# Patient Record
Sex: MALE | Race: WHITE | NOT HISPANIC OR LATINO | Employment: OTHER | URBAN - METROPOLITAN AREA
[De-identification: names, ages, dates, MRNs, and addresses within clinical notes are randomized per-mention and may not be internally consistent; named-entity substitution may affect disease eponyms.]

---

## 2017-03-27 ENCOUNTER — HISTORICAL (OUTPATIENT)
Dept: RADIOLOGY | Facility: HOSPITAL | Age: 77
End: 2017-03-27

## 2017-03-29 ENCOUNTER — HISTORICAL (OUTPATIENT)
Dept: LAB | Facility: HOSPITAL | Age: 77
End: 2017-03-29

## 2017-03-30 ENCOUNTER — HISTORICAL (OUTPATIENT)
Dept: MEDSURG UNIT | Facility: HOSPITAL | Age: 77
End: 2017-03-30

## 2017-04-13 ENCOUNTER — HISTORICAL (OUTPATIENT)
Dept: RADIOLOGY | Facility: HOSPITAL | Age: 77
End: 2017-04-13

## 2017-04-17 ENCOUNTER — HISTORICAL (OUTPATIENT)
Dept: CARDIOLOGY | Facility: HOSPITAL | Age: 77
End: 2017-04-17

## 2017-07-07 ENCOUNTER — HISTORICAL (OUTPATIENT)
Dept: RADIOLOGY | Facility: HOSPITAL | Age: 77
End: 2017-07-07

## 2017-07-26 ENCOUNTER — HISTORICAL (OUTPATIENT)
Dept: ADMINISTRATIVE | Facility: HOSPITAL | Age: 77
End: 2017-07-26

## 2017-11-14 ENCOUNTER — HISTORICAL (OUTPATIENT)
Dept: LAB | Facility: HOSPITAL | Age: 77
End: 2017-11-14

## 2018-09-26 ENCOUNTER — HISTORICAL (OUTPATIENT)
Dept: RADIOLOGY | Facility: HOSPITAL | Age: 78
End: 2018-09-26

## 2019-02-08 ENCOUNTER — HISTORICAL (OUTPATIENT)
Dept: LAB | Facility: HOSPITAL | Age: 79
End: 2019-02-08

## 2021-02-02 ENCOUNTER — HISTORICAL (OUTPATIENT)
Dept: RADIOLOGY | Facility: HOSPITAL | Age: 81
End: 2021-02-02

## 2021-02-02 LAB
ALBUMIN SERPL-MCNC: 4 GM/DL (ref 3.4–4.8)
ALBUMIN/GLOB SERPL: 1.2 RATIO (ref 1.1–2)
ALP SERPL-CCNC: 99 UNIT/L (ref 40–150)
ALT SERPL-CCNC: 18 UNIT/L (ref 0–55)
AST SERPL-CCNC: 22 UNIT/L (ref 5–34)
BILIRUB SERPL-MCNC: 0.9 MG/DL
BILIRUBIN DIRECT+TOT PNL SERPL-MCNC: 0.3 MG/DL (ref 0–0.5)
BILIRUBIN DIRECT+TOT PNL SERPL-MCNC: 0.6 MG/DL (ref 0–0.8)
BUN SERPL-MCNC: 12.5 MG/DL (ref 8.4–25.7)
CALCIUM SERPL-MCNC: 9 MG/DL (ref 8.8–10)
CHLORIDE SERPL-SCNC: 106 MMOL/L (ref 98–107)
CO2 SERPL-SCNC: 26 MMOL/L (ref 23–31)
CREAT SERPL-MCNC: 1.31 MG/DL (ref 0.73–1.18)
GLOBULIN SER-MCNC: 3.3 GM/DL (ref 2.4–3.5)
GLUCOSE SERPL-MCNC: 109 MG/DL (ref 82–115)
POTASSIUM SERPL-SCNC: 4.8 MMOL/L (ref 3.5–5.1)
PROT SERPL-MCNC: 7.3 GM/DL (ref 5.8–7.6)
SODIUM SERPL-SCNC: 141 MMOL/L (ref 136–145)

## 2021-04-19 ENCOUNTER — HISTORICAL (OUTPATIENT)
Dept: LAB | Facility: HOSPITAL | Age: 81
End: 2021-04-19

## 2021-04-19 LAB
ALBUMIN SERPL-MCNC: 4.2 GM/DL (ref 3.4–4.8)
ALP SERPL-CCNC: 84 UNIT/L (ref 40–150)
ALT SERPL-CCNC: 19 UNIT/L (ref 0–55)
AST SERPL-CCNC: 18 UNIT/L (ref 5–34)
BILIRUB SERPL-MCNC: 1.1 MG/DL
BILIRUBIN DIRECT+TOT PNL SERPL-MCNC: 0.5 MG/DL (ref 0–0.5)
BILIRUBIN DIRECT+TOT PNL SERPL-MCNC: 0.6 MG/DL (ref 0–0.8)
CHOLEST SERPL-MCNC: 113 MG/DL
CHOLEST/HDLC SERPL: 3 {RATIO} (ref 0–5)
HDLC SERPL-MCNC: 45 MG/DL (ref 35–60)
LDLC SERPL CALC-MCNC: 48 MG/DL (ref 50–140)
PROT SERPL-MCNC: 6.7 GM/DL (ref 5.8–7.6)
TRIGL SERPL-MCNC: 100 MG/DL (ref 34–140)
VLDLC SERPL CALC-MCNC: 20 MG/DL

## 2021-09-28 ENCOUNTER — HISTORICAL (OUTPATIENT)
Dept: LAB | Facility: HOSPITAL | Age: 81
End: 2021-09-28

## 2021-09-28 LAB
ABS NEUT (OLG): 3.3 X10(3)/MCL (ref 2.1–9.2)
ALBUMIN SERPL-MCNC: 4 GM/DL (ref 3.4–4.8)
ALP SERPL-CCNC: 77 UNIT/L (ref 40–150)
ALT SERPL-CCNC: 22 UNIT/L (ref 0–55)
AST SERPL-CCNC: 20 UNIT/L (ref 5–34)
BASOPHILS # BLD AUTO: 0 X10(3)/MCL (ref 0–0.2)
BASOPHILS NFR BLD AUTO: 0 %
BILIRUB SERPL-MCNC: 0.9 MG/DL
BILIRUBIN DIRECT+TOT PNL SERPL-MCNC: 0.3 MG/DL (ref 0–0.5)
BILIRUBIN DIRECT+TOT PNL SERPL-MCNC: 0.6 MG/DL (ref 0–0.8)
BUN SERPL-MCNC: 18.6 MG/DL (ref 8.4–25.7)
CALCIUM SERPL-MCNC: 9.5 MG/DL (ref 8.8–10)
CHLORIDE SERPL-SCNC: 110 MMOL/L (ref 98–107)
CHOLEST SERPL-MCNC: 161 MG/DL
CHOLEST/HDLC SERPL: 4 {RATIO} (ref 0–5)
CO2 SERPL-SCNC: 26 MMOL/L (ref 23–31)
CREAT SERPL-MCNC: 1.54 MG/DL (ref 0.73–1.18)
CREAT/UREA NIT SERPL: 12
EOSINOPHIL # BLD AUTO: 0.2 X10(3)/MCL (ref 0–0.9)
EOSINOPHIL NFR BLD AUTO: 4 %
ERYTHROCYTE [DISTWIDTH] IN BLOOD BY AUTOMATED COUNT: 12.5 % (ref 11.5–17)
FT4I SERPL CALC-MCNC: 2.5 (ref 2.6–3.6)
GLUCOSE SERPL-MCNC: 104 MG/DL (ref 82–115)
HCT VFR BLD AUTO: 38.6 % (ref 42–52)
HDLC SERPL-MCNC: 41 MG/DL (ref 35–60)
HGB BLD-MCNC: 13.1 GM/DL (ref 14–18)
IMM GRANULOCYTES # BLD AUTO: 0.01 % (ref 0–0.02)
IMM GRANULOCYTES NFR BLD AUTO: 0.2 % (ref 0–0.43)
LDLC SERPL CALC-MCNC: 96 MG/DL (ref 50–140)
LYMPHOCYTES # BLD AUTO: 1.8 X10(3)/MCL (ref 0.6–4.6)
LYMPHOCYTES NFR BLD AUTO: 31 %
MCH RBC QN AUTO: 31.9 PG (ref 27–31)
MCHC RBC AUTO-ENTMCNC: 33.9 GM/DL (ref 33–36)
MCV RBC AUTO: 93.9 FL (ref 80–94)
MONOCYTES # BLD AUTO: 0.4 X10(3)/MCL (ref 0.1–1.3)
MONOCYTES NFR BLD AUTO: 8 %
NEUTROPHILS # BLD AUTO: 3.3 X10(3)/MCL (ref 1.4–7.9)
NEUTROPHILS NFR BLD AUTO: 57 %
PLATELET # BLD AUTO: 162 X10(3)/MCL (ref 130–400)
PMV BLD AUTO: 10.7 FL (ref 9.4–12.4)
POTASSIUM SERPL-SCNC: 5.7 MMOL/L (ref 3.5–5.1)
PROT SERPL-MCNC: 7 GM/DL (ref 5.8–7.6)
RBC # BLD AUTO: 4.11 X10(6)/MCL (ref 4.7–6.1)
SODIUM SERPL-SCNC: 144 MMOL/L (ref 136–145)
T3RU NFR SERPL: 37.3 % (ref 31–39)
T4 SERPL-MCNC: 6.69 UG/DL (ref 4.87–11.72)
TRIGL SERPL-MCNC: 118 MG/DL (ref 34–140)
TSH SERPL-ACNC: 5.38 UIU/ML (ref 0.35–4.94)
VLDLC SERPL CALC-MCNC: 24 MG/DL
WBC # SPEC AUTO: 5.8 X10(3)/MCL (ref 4.5–11.5)

## 2021-10-19 ENCOUNTER — HISTORICAL (OUTPATIENT)
Dept: RADIOLOGY | Facility: HOSPITAL | Age: 81
End: 2021-10-19

## 2022-04-07 ENCOUNTER — HISTORICAL (OUTPATIENT)
Dept: ADMINISTRATIVE | Facility: HOSPITAL | Age: 82
End: 2022-04-07
Payer: OTHER GOVERNMENT

## 2022-04-24 VITALS — HEIGHT: 71 IN | WEIGHT: 205.94 LBS | BODY MASS INDEX: 28.83 KG/M2

## 2022-04-28 NOTE — OP NOTE
DATE OF SURGERY:    26 July, 2017    SURGEON:  Qamar Ricci IV, MD  ASSISTANT:  SHAWN Whitmore    PREOPERATIVE DIAGNOSIS:  Progressive and intractable biliary colic with recent angiogram and placement of drug-eluting stent (three months) with the patient currently on Plavix and aspirin in the setting of coronary atherosclerotic disease, hypertension and hyperlipidemia.    POSTOPERATIVE DIAGNOSIS:    1. Progressive and intractable biliary colic with recent angiogram and placement of drug-eluting stent (three months) with the patient currently on Plavix and aspirin in the setting of coronary atherosclerotic disease, hypertension and hyperlipidemia.  2. Findings of moderate chronic cholecystitis.    PROCEDURE:  Laparoscopic cholecystectomy -  difficult secondary to full and complete concurrent anticoagulation.     Anesthesia:  General with local infiltration.     Estimated blood loss:  Less than 2 ml.     Specimen removed:  Gallbladder with contents aspirated.    BRIEF HISTORY:  This 76-year-old male presented with progressive and intractable epigastric pain and chest pain.  This led to a cardiac work up resulting in the patient undergoing angiography and stent placement.  However, following angiography and stent the patient failed to have relief of his symptoms, which did become progressive and intractable and proceed to the point where he could not tolerate the symptoms any further.  Additional work up was consistent with significant biliary colic on the HIDA scan.  The patient was subsequently referred to me for further consultation and discussion in regards for need for additional therapy.  Full and viri discussion regarding the necessity for maintaining the patient on antiplatelet therapy was made with the patient fully understanding his risk of heart attack and the possible fatal consequences should we withdraw his antiplatelet therapy.  However, he stated again and again that he could not live with  his current symptoms and he could not possibly tolerate proceeding on like this and he would rather die than continue to live with his pain and discomfort whenever he ate.  Subsequently with his full and viri understanding of the increased risk of possible bleeding and his thrombosis as well we plan to proceed to the operating room with a careful and tedious cholecystectomy with strict attention to hemostasis above and beyond what we might accept in regards to nonsurgical bleeding.    PROCEDURE IN DETAIL:  After proper informed consent was obtained the patient was transported to the operating room where he was placed supine on the operating room table.  After an adequate level of general endotracheal anesthesia was achieved the patient's abdomen was prepped and draped in the standard sterile surgical fashion.  The operation commenced with infiltration of local anesthetic in the periumbilical area followed by a periumbilical incision and blunt dissection of the base of the umbilical ligament where a small hernia was identified and dilated laterally.  Careful and tedious attention was made to any bleeding vessels and all were controlled with spot application of Bovie electrocautery.  A blunt tip trocar was inserted and secured.  Abdominal insufflation was undertaken to 15 mmHg pressure without significant hemodynamic change.  A camera was inserted.  Abdominal contents were inspected and photographed.  No masses, lesions or pathology were immediately identified.  Following infiltration of local anesthetic and through minor stab incision two epigastric 5 mm trocars were placed.  Utilizing these trocars the gallbladder was grasped and elevated.  The fibrofatty tissues were dissected from the triangle of Calot with careful and tedious technique with spot application Bovie electrocautery to every single bleeding vessel no matter how small that we identified.  That being said, Bovie electrocautery use was judicious and was  well away from any critical structures.  Once the critical view was achieved two clips were placed in the proximal cystic artery, two clips in the proximal cystic duct, one clip distally.  The duct and artery were transected in sequence.  The gallbladder was excised from its bed with low power Bovie electrocautery with a careful and tedious approach with strict tensioned hemostasis, which was excellent during the course of the resection.  The gallbladder was subsequently removed from its bed and lysed from its adhesions and was withdrawn through the umbilical trocar site.  The camera was reinserted, copious irrigation of the area assured that there was absolutely no hemorrhage whatsoever, and hemostasis was perfect and there was no bile staining.  The epigastric trocars were removed under direct visualization.  Pinpoint Bovie electrocautery was placed at the peritoneal surface where there was point bleeding.  The sites were inspected for a prolonged period of time under reduced insufflation of the abdomen with 5 mmHg pressure in order to witness any potential venous oozing.  With none seen the umbilical trocar was finally removed.  Hemostasis at this site was excellent also.  The fascia was closed with 0 Vicryl figure-of-eight suture x two.  The wounds were irrigated with warm normal saline and closed with a combination of 3-0 Vicryl and 4-0 Monocryl subcuticular closures.  Sterile dressings were applied.  The patient was awakened from his anesthesia and transported to the recovery room in good and stable condition.  All sponge, needle and instrument counts were correct at the end of the case.  There were no complications.        ______________________________  MD LYNDON Rodriguez IV/TAMMI  DD:  08/02/2017  Time:  08:55PM  DT:  08/03/2017  Time:  11:51AM  Job #:  [id number]

## 2022-07-19 ENCOUNTER — HOSPITAL ENCOUNTER (EMERGENCY)
Facility: HOSPITAL | Age: 82
Discharge: LEFT BEFORE DISCHARGE | End: 2022-07-19
Payer: OTHER GOVERNMENT

## 2022-07-19 VITALS
OXYGEN SATURATION: 99 % | TEMPERATURE: 98 F | SYSTOLIC BLOOD PRESSURE: 159 MMHG | RESPIRATION RATE: 18 BRPM | HEIGHT: 71 IN | HEART RATE: 56 BPM | WEIGHT: 200 LBS | BODY MASS INDEX: 28 KG/M2 | DIASTOLIC BLOOD PRESSURE: 81 MMHG

## 2022-07-19 LAB
APPEARANCE UR: ABNORMAL
BACTERIA #/AREA URNS AUTO: ABNORMAL /HPF
BILIRUB UR QL STRIP.AUTO: ABNORMAL MG/DL
COLOR UR AUTO: ABNORMAL
GLUCOSE UR QL STRIP.AUTO: NEGATIVE MG/DL
KETONES UR QL STRIP.AUTO: NEGATIVE MG/DL
LEUKOCYTE ESTERASE UR QL STRIP.AUTO: ABNORMAL UNIT/L
NITRITE UR QL STRIP.AUTO: POSITIVE
PH UR STRIP.AUTO: 7 [PH]
PROT UR QL STRIP.AUTO: >=300 MG/DL
RBC #/AREA URNS AUTO: >=100 /HPF
RBC UR QL AUTO: ABNORMAL UNIT/L
SP GR UR STRIP.AUTO: 1.01
SQUAMOUS #/AREA URNS AUTO: ABNORMAL /HPF
UROBILINOGEN UR STRIP-ACNC: 1 MG/DL
WBC #/AREA URNS AUTO: ABNORMAL /HPF

## 2022-07-19 PROCEDURE — 99900041 HC LEFT WITHOUT BEING SEEN- EMERGENCY

## 2022-07-19 PROCEDURE — 81001 URINALYSIS AUTO W/SCOPE: CPT | Performed by: EMERGENCY MEDICINE

## 2022-07-21 LAB — BACTERIA UR CULT: NORMAL

## 2022-10-03 DIAGNOSIS — C67.0 MALIGNANT NEOPLASM OF TRIGONE OF URINARY BLADDER: Primary | ICD-10-CM

## 2022-10-04 ENCOUNTER — HOSPITAL ENCOUNTER (OUTPATIENT)
Dept: RADIOLOGY | Facility: HOSPITAL | Age: 82
Discharge: HOME | End: 2022-10-04
Attending: UROLOGY
Payer: OTHER GOVERNMENT

## 2022-10-04 ENCOUNTER — ANESTHESIA EVENT (OUTPATIENT)
Dept: SURGERY | Facility: HOSPITAL | Age: 82
DRG: 655 | End: 2022-10-04
Payer: OTHER GOVERNMENT

## 2022-10-04 DIAGNOSIS — C67.9 MALIGNANT NEOPLASM OF URINARY BLADDER, UNSPECIFIED SITE: ICD-10-CM

## 2022-10-04 PROCEDURE — 71046 X-RAY EXAM CHEST 2 VIEWS: CPT | Mod: TC

## 2022-10-04 RX ORDER — ACETAMINOPHEN 500 MG
1000 TABLET ORAL EVERY 6 HOURS PRN
COMMUNITY
End: 2023-10-03 | Stop reason: ALTCHOICE

## 2022-10-04 RX ORDER — IBUPROFEN 200 MG
400 TABLET ORAL EVERY 6 HOURS PRN
COMMUNITY
End: 2023-08-02 | Stop reason: ALTCHOICE

## 2022-10-04 RX ORDER — HYDROCODONE BITARTRATE AND ACETAMINOPHEN 5; 325 MG/1; MG/1
1 TABLET ORAL EVERY 4 HOURS PRN
COMMUNITY
Start: 2022-08-19 | End: 2023-10-03 | Stop reason: ALTCHOICE

## 2022-10-04 RX ORDER — MIRABEGRON 50 MG/1
1 TABLET, FILM COATED, EXTENDED RELEASE ORAL DAILY
COMMUNITY
End: 2023-10-03 | Stop reason: ALTCHOICE

## 2022-10-04 RX ORDER — LOSARTAN POTASSIUM 50 MG/1
50 TABLET ORAL DAILY
Status: ON HOLD | COMMUNITY
End: 2022-10-11

## 2022-10-04 RX ORDER — CEFDINIR 300 MG/1
300 CAPSULE ORAL 2 TIMES DAILY
COMMUNITY
End: 2023-10-03 | Stop reason: ALTCHOICE

## 2022-10-04 RX ORDER — OMEPRAZOLE 20 MG/1
20 CAPSULE, DELAYED RELEASE ORAL DAILY
Status: ON HOLD | COMMUNITY
End: 2023-12-02 | Stop reason: HOSPADM

## 2022-10-04 RX ORDER — HYOSCYAMINE SULFATE 0.12 MG/1
1 TABLET SUBLINGUAL EVERY 6 HOURS PRN
COMMUNITY
Start: 2022-08-19 | End: 2023-10-03 | Stop reason: ALTCHOICE

## 2022-10-05 ENCOUNTER — HOSPITAL ENCOUNTER (OUTPATIENT)
Dept: RADIOLOGY | Facility: HOSPITAL | Age: 82
Discharge: HOME | End: 2022-10-05
Attending: UROLOGY
Payer: OTHER GOVERNMENT

## 2022-10-05 DIAGNOSIS — C67.0 MALIGNANT NEOPLASM OF TRIGONE OF URINARY BLADDER: ICD-10-CM

## 2022-10-05 PROCEDURE — 74176 CT ABD & PELVIS W/O CONTRAST: CPT | Mod: TC

## 2022-10-06 ENCOUNTER — LAB VISIT (OUTPATIENT)
Dept: LAB | Facility: HOSPITAL | Age: 82
End: 2022-10-06
Attending: FAMILY MEDICINE
Payer: OTHER GOVERNMENT

## 2022-10-06 DIAGNOSIS — Z12.5 SCREENING FOR PROSTATE CANCER: ICD-10-CM

## 2022-10-06 DIAGNOSIS — R79.89 HYPOURICEMIA: ICD-10-CM

## 2022-10-06 DIAGNOSIS — E87.5 HYPERPOTASSEMIA: ICD-10-CM

## 2022-10-06 DIAGNOSIS — D64.9 ANEMIA, UNSPECIFIED TYPE: ICD-10-CM

## 2022-10-06 DIAGNOSIS — E78.5 HYPERLIPIDEMIA, UNSPECIFIED HYPERLIPIDEMIA TYPE: ICD-10-CM

## 2022-10-06 DIAGNOSIS — I10 ESSENTIAL HYPERTENSION, MALIGNANT: Primary | ICD-10-CM

## 2022-10-06 DIAGNOSIS — I25.10 CORONARY ATHEROSCLEROSIS OF NATIVE CORONARY ARTERY: ICD-10-CM

## 2022-10-06 LAB
ALBUMIN SERPL-MCNC: 3.3 GM/DL (ref 3.4–4.8)
ALP SERPL-CCNC: 100 UNIT/L (ref 40–150)
ALT SERPL-CCNC: 16 UNIT/L (ref 0–55)
ANION GAP SERPL CALC-SCNC: 9 MEQ/L
AST SERPL-CCNC: 22 UNIT/L (ref 5–34)
BASOPHILS # BLD AUTO: 0.02 X10(3)/MCL (ref 0–0.2)
BASOPHILS NFR BLD AUTO: 0.4 %
BILIRUBIN DIRECT+TOT PNL SERPL-MCNC: 0.2 MG/DL (ref 0–0.5)
BILIRUBIN DIRECT+TOT PNL SERPL-MCNC: 0.2 MG/DL (ref 0–0.8)
BILIRUBIN DIRECT+TOT PNL SERPL-MCNC: 0.4 MG/DL
BUN SERPL-MCNC: 18.5 MG/DL (ref 8.4–25.7)
CALCIUM SERPL-MCNC: 8.6 MG/DL (ref 8.8–10)
CHLORIDE SERPL-SCNC: 103 MMOL/L (ref 98–107)
CHOLEST SERPL-MCNC: 125 MG/DL
CHOLEST/HDLC SERPL: 3 {RATIO} (ref 0–5)
CO2 SERPL-SCNC: 28 MMOL/L (ref 23–31)
CREAT SERPL-MCNC: 1.57 MG/DL (ref 0.73–1.18)
CREAT/UREA NIT SERPL: 12
EOSINOPHIL # BLD AUTO: 0.13 X10(3)/MCL (ref 0–0.9)
EOSINOPHIL NFR BLD AUTO: 2.7 %
ERYTHROCYTE [DISTWIDTH] IN BLOOD BY AUTOMATED COUNT: 12.9 % (ref 11.5–17)
FT4I SERPL CALC-MCNC: 2.84 (ref 2.6–3.6)
GFR SERPLBLD CREATININE-BSD FMLA CKD-EPI: 44 MLS/MIN/1.73/M2
GLUCOSE SERPL-MCNC: 101 MG/DL (ref 82–115)
HCT VFR BLD AUTO: 32.6 % (ref 42–52)
HDLC SERPL-MCNC: 36 MG/DL (ref 35–60)
HGB BLD-MCNC: 10.3 GM/DL (ref 14–18)
IMM GRANULOCYTES # BLD AUTO: 0.02 X10(3)/MCL (ref 0–0.04)
IMM GRANULOCYTES NFR BLD AUTO: 0.4 %
LDLC SERPL CALC-MCNC: 70 MG/DL (ref 50–140)
LYMPHOCYTES # BLD AUTO: 0.66 X10(3)/MCL (ref 0.6–4.6)
LYMPHOCYTES NFR BLD AUTO: 13.7 %
MCH RBC QN AUTO: 29.7 PG (ref 27–31)
MCHC RBC AUTO-ENTMCNC: 31.6 MG/DL (ref 33–36)
MCV RBC AUTO: 93.9 FL (ref 80–94)
MONOCYTES # BLD AUTO: 0.32 X10(3)/MCL (ref 0.1–1.3)
MONOCYTES NFR BLD AUTO: 6.7 %
NEUTROPHILS # BLD AUTO: 3.7 X10(3)/MCL (ref 2.1–9.2)
NEUTROPHILS NFR BLD AUTO: 76.1 %
PLATELET # BLD AUTO: 198 X10(3)/MCL (ref 130–400)
PMV BLD AUTO: 10.1 FL (ref 7.4–10.4)
POTASSIUM SERPL-SCNC: 4.2 MMOL/L (ref 3.5–5.1)
PROT SERPL-MCNC: 6.8 GM/DL (ref 5.8–7.6)
PSA SERPL-MCNC: <0.02 NG/ML
RBC # BLD AUTO: 3.47 X10(6)/MCL (ref 4.7–6.1)
SODIUM SERPL-SCNC: 140 MMOL/L (ref 136–145)
T3RU NFR SERPL: 40.89 % (ref 31–39)
T4 SERPL-MCNC: 6.95 UG/DL (ref 4.87–11.72)
TRIGL SERPL-MCNC: 95 MG/DL (ref 34–140)
TSH SERPL-ACNC: 2.28 UIU/ML (ref 0.35–4.94)
VLDLC SERPL CALC-MCNC: 19 MG/DL
WBC # SPEC AUTO: 4.8 X10(3)/MCL (ref 4.5–11.5)

## 2022-10-06 PROCEDURE — 84436 ASSAY OF TOTAL THYROXINE: CPT

## 2022-10-06 PROCEDURE — 84443 ASSAY THYROID STIM HORMONE: CPT

## 2022-10-06 PROCEDURE — 80061 LIPID PANEL: CPT

## 2022-10-06 PROCEDURE — 36415 COLL VENOUS BLD VENIPUNCTURE: CPT

## 2022-10-06 PROCEDURE — 80048 BASIC METABOLIC PNL TOTAL CA: CPT

## 2022-10-06 PROCEDURE — 85025 COMPLETE CBC W/AUTO DIFF WBC: CPT

## 2022-10-06 PROCEDURE — 84479 ASSAY OF THYROID (T3 OR T4): CPT

## 2022-10-06 PROCEDURE — 80076 HEPATIC FUNCTION PANEL: CPT

## 2022-10-06 PROCEDURE — 84153 ASSAY OF PSA TOTAL: CPT

## 2022-10-11 ENCOUNTER — ANESTHESIA (OUTPATIENT)
Dept: SURGERY | Facility: HOSPITAL | Age: 82
DRG: 655 | End: 2022-10-11
Payer: OTHER GOVERNMENT

## 2022-10-11 ENCOUNTER — HOSPITAL ENCOUNTER (INPATIENT)
Facility: HOSPITAL | Age: 82
LOS: 6 days | Discharge: HOME HEALTH | DRG: 655 | End: 2022-10-17
Attending: UROLOGY | Admitting: UROLOGY
Payer: OTHER GOVERNMENT

## 2022-10-11 DIAGNOSIS — C67.0 MALIGNANT NEOPLASM OF TRIGONE OF URINARY BLADDER: Primary | ICD-10-CM

## 2022-10-11 DIAGNOSIS — C67.9 BLADDER CANCER: ICD-10-CM

## 2022-10-11 LAB
GROUP & RH: NORMAL
INDIRECT COOMBS GEL: NORMAL

## 2022-10-11 PROCEDURE — 11000001 HC ACUTE MED/SURG PRIVATE ROOM

## 2022-10-11 PROCEDURE — 25000003 PHARM REV CODE 250: Performed by: NURSE ANESTHETIST, CERTIFIED REGISTERED

## 2022-10-11 PROCEDURE — 37000008 HC ANESTHESIA 1ST 15 MINUTES: Performed by: UROLOGY

## 2022-10-11 PROCEDURE — 63600175 PHARM REV CODE 636 W HCPCS: Performed by: UROLOGY

## 2022-10-11 PROCEDURE — 36000713 HC OR TIME LEV V EA ADD 15 MIN: Performed by: UROLOGY

## 2022-10-11 PROCEDURE — 36000712 HC OR TIME LEV V 1ST 15 MIN: Performed by: UROLOGY

## 2022-10-11 PROCEDURE — 63600175 PHARM REV CODE 636 W HCPCS: Performed by: NURSE ANESTHETIST, CERTIFIED REGISTERED

## 2022-10-11 PROCEDURE — 63600175 PHARM REV CODE 636 W HCPCS: Performed by: ANESTHESIOLOGY

## 2022-10-11 PROCEDURE — 71000033 HC RECOVERY, INTIAL HOUR: Performed by: UROLOGY

## 2022-10-11 PROCEDURE — 25000003 PHARM REV CODE 250: Performed by: UROLOGY

## 2022-10-11 PROCEDURE — C2617 STENT, NON-COR, TEM W/O DEL: HCPCS | Performed by: UROLOGY

## 2022-10-11 PROCEDURE — 71000039 HC RECOVERY, EACH ADD'L HOUR: Performed by: UROLOGY

## 2022-10-11 PROCEDURE — 25000003 PHARM REV CODE 250: Performed by: ANESTHESIOLOGY

## 2022-10-11 PROCEDURE — 27201423 OPTIME MED/SURG SUP & DEVICES STERILE SUPPLY: Performed by: UROLOGY

## 2022-10-11 PROCEDURE — C1729 CATH, DRAINAGE: HCPCS | Performed by: UROLOGY

## 2022-10-11 PROCEDURE — 37000009 HC ANESTHESIA EA ADD 15 MINS: Performed by: UROLOGY

## 2022-10-11 PROCEDURE — 88305 TISSUE EXAM BY PATHOLOGIST: CPT | Performed by: UROLOGY

## 2022-10-11 PROCEDURE — 86850 RBC ANTIBODY SCREEN: CPT | Performed by: UROLOGY

## 2022-10-11 PROCEDURE — 36415 COLL VENOUS BLD VENIPUNCTURE: CPT | Performed by: UROLOGY

## 2022-10-11 DEVICE — IMPLANTABLE DEVICE: Type: IMPLANTABLE DEVICE | Site: KIDNEY | Status: FUNCTIONAL

## 2022-10-11 RX ORDER — SODIUM CHLORIDE, SODIUM LACTATE, POTASSIUM CHLORIDE, CALCIUM CHLORIDE 600; 310; 30; 20 MG/100ML; MG/100ML; MG/100ML; MG/100ML
1000 INJECTION, SOLUTION INTRAVENOUS ONCE
Status: DISCONTINUED | OUTPATIENT
Start: 2022-10-11 | End: 2022-10-11

## 2022-10-11 RX ORDER — PANTOPRAZOLE SODIUM 40 MG/1
40 TABLET, DELAYED RELEASE ORAL DAILY
Status: DISCONTINUED | OUTPATIENT
Start: 2022-10-12 | End: 2022-10-17 | Stop reason: HOSPADM

## 2022-10-11 RX ORDER — SODIUM CHLORIDE, SODIUM GLUCONATE, SODIUM ACETATE, POTASSIUM CHLORIDE AND MAGNESIUM CHLORIDE 30; 37; 368; 526; 502 MG/100ML; MG/100ML; MG/100ML; MG/100ML; MG/100ML
1000 INJECTION, SOLUTION INTRAVENOUS CONTINUOUS
Status: DISCONTINUED | OUTPATIENT
Start: 2022-10-11 | End: 2022-10-11

## 2022-10-11 RX ORDER — METRONIDAZOLE 500 MG/1
500 TABLET ORAL EVERY 8 HOURS
Status: DISCONTINUED | OUTPATIENT
Start: 2022-10-11 | End: 2022-10-17 | Stop reason: HOSPADM

## 2022-10-11 RX ORDER — ONDANSETRON 4 MG/1
8 TABLET, ORALLY DISINTEGRATING ORAL EVERY 8 HOURS PRN
Status: DISCONTINUED | OUTPATIENT
Start: 2022-10-11 | End: 2022-10-17 | Stop reason: HOSPADM

## 2022-10-11 RX ORDER — ADHESIVE BANDAGE
30 BANDAGE TOPICAL DAILY PRN
Status: DISCONTINUED | OUTPATIENT
Start: 2022-10-11 | End: 2022-10-17 | Stop reason: HOSPADM

## 2022-10-11 RX ORDER — ONDANSETRON 2 MG/ML
4 INJECTION INTRAMUSCULAR; INTRAVENOUS DAILY PRN
Status: DISCONTINUED | OUTPATIENT
Start: 2022-10-11 | End: 2022-10-11

## 2022-10-11 RX ORDER — CEFAZOLIN SODIUM 2 G/100ML
2 INJECTION, SOLUTION INTRAVENOUS
Status: COMPLETED | OUTPATIENT
Start: 2022-10-11 | End: 2022-10-12

## 2022-10-11 RX ORDER — ENOXAPARIN SODIUM 100 MG/ML
30 INJECTION SUBCUTANEOUS EVERY 24 HOURS
Status: DISCONTINUED | OUTPATIENT
Start: 2022-10-11 | End: 2022-10-17 | Stop reason: HOSPADM

## 2022-10-11 RX ORDER — PROPOFOL 10 MG/ML
VIAL (ML) INTRAVENOUS
Status: DISCONTINUED | OUTPATIENT
Start: 2022-10-11 | End: 2022-10-12

## 2022-10-11 RX ORDER — CELECOXIB 200 MG/1
200 CAPSULE ORAL
Status: DISCONTINUED | OUTPATIENT
Start: 2022-10-11 | End: 2022-10-11

## 2022-10-11 RX ORDER — HYDROMORPHONE HYDROCHLORIDE 2 MG/ML
0.4 INJECTION, SOLUTION INTRAMUSCULAR; INTRAVENOUS; SUBCUTANEOUS EVERY 5 MIN PRN
Status: DISCONTINUED | OUTPATIENT
Start: 2022-10-11 | End: 2022-10-11

## 2022-10-11 RX ORDER — PHENYLEPHRINE HCL IN 0.9% NACL 1 MG/10 ML
SYRINGE (ML) INTRAVENOUS
Status: DISCONTINUED | OUTPATIENT
Start: 2022-10-11 | End: 2022-10-12

## 2022-10-11 RX ORDER — HYDROCODONE BITARTRATE AND ACETAMINOPHEN 5; 325 MG/1; MG/1
1 TABLET ORAL EVERY 4 HOURS PRN
Status: DISCONTINUED | OUTPATIENT
Start: 2022-10-11 | End: 2022-10-17 | Stop reason: HOSPADM

## 2022-10-11 RX ORDER — CEFAZOLIN SODIUM 2 G/100ML
2 INJECTION, SOLUTION INTRAVENOUS
Status: DISCONTINUED | OUTPATIENT
Start: 2022-10-11 | End: 2022-10-11

## 2022-10-11 RX ORDER — HYDROMORPHONE HYDROCHLORIDE 2 MG/ML
1 INJECTION, SOLUTION INTRAMUSCULAR; INTRAVENOUS; SUBCUTANEOUS EVERY 4 HOURS PRN
Status: DISCONTINUED | OUTPATIENT
Start: 2022-10-11 | End: 2022-10-17 | Stop reason: HOSPADM

## 2022-10-11 RX ORDER — MAG HYDROX/ALUMINUM HYD/SIMETH 200-200-20
30 SUSPENSION, ORAL (FINAL DOSE FORM) ORAL
Status: DISCONTINUED | OUTPATIENT
Start: 2022-10-11 | End: 2022-10-17 | Stop reason: HOSPADM

## 2022-10-11 RX ORDER — FENTANYL CITRATE 50 UG/ML
INJECTION, SOLUTION INTRAMUSCULAR; INTRAVENOUS
Status: DISCONTINUED | OUTPATIENT
Start: 2022-10-11 | End: 2022-10-12

## 2022-10-11 RX ORDER — ACETAMINOPHEN 10 MG/ML
INJECTION, SOLUTION INTRAVENOUS
Status: DISCONTINUED | OUTPATIENT
Start: 2022-10-11 | End: 2022-10-12

## 2022-10-11 RX ORDER — ACETAMINOPHEN 325 MG/1
650 TABLET ORAL EVERY 4 HOURS PRN
Status: DISCONTINUED | OUTPATIENT
Start: 2022-10-11 | End: 2022-10-17 | Stop reason: HOSPADM

## 2022-10-11 RX ORDER — MIDAZOLAM HYDROCHLORIDE 1 MG/ML
2 INJECTION INTRAMUSCULAR; INTRAVENOUS
Status: DISCONTINUED | OUTPATIENT
Start: 2022-10-11 | End: 2022-10-11

## 2022-10-11 RX ORDER — SUCRALFATE 1 G/1
1 TABLET ORAL EVERY 6 HOURS
Status: DISCONTINUED | OUTPATIENT
Start: 2022-10-11 | End: 2022-10-17 | Stop reason: HOSPADM

## 2022-10-11 RX ORDER — HYDROCODONE BITARTRATE AND ACETAMINOPHEN 500; 5 MG/1; MG/1
TABLET ORAL
Status: DISCONTINUED | OUTPATIENT
Start: 2022-10-11 | End: 2022-10-11

## 2022-10-11 RX ORDER — SODIUM CHLORIDE, SODIUM LACTATE, POTASSIUM CHLORIDE, CALCIUM CHLORIDE 600; 310; 30; 20 MG/100ML; MG/100ML; MG/100ML; MG/100ML
INJECTION, SOLUTION INTRAVENOUS CONTINUOUS
Status: DISCONTINUED | OUTPATIENT
Start: 2022-10-11 | End: 2022-10-14

## 2022-10-11 RX ORDER — DEXAMETHASONE SODIUM PHOSPHATE 4 MG/ML
INJECTION, SOLUTION INTRA-ARTICULAR; INTRALESIONAL; INTRAMUSCULAR; INTRAVENOUS; SOFT TISSUE
Status: DISCONTINUED | OUTPATIENT
Start: 2022-10-11 | End: 2022-10-12

## 2022-10-11 RX ORDER — ACETAMINOPHEN 500 MG
1000 TABLET ORAL
Status: DISCONTINUED | OUTPATIENT
Start: 2022-10-11 | End: 2022-10-11

## 2022-10-11 RX ORDER — KETOROLAC TROMETHAMINE 30 MG/ML
15 INJECTION, SOLUTION INTRAMUSCULAR; INTRAVENOUS EVERY 6 HOURS
Status: COMPLETED | OUTPATIENT
Start: 2022-10-11 | End: 2022-10-14

## 2022-10-11 RX ORDER — LIDOCAINE HYDROCHLORIDE 20 MG/ML
INJECTION, SOLUTION EPIDURAL; INFILTRATION; INTRACAUDAL; PERINEURAL
Status: DISCONTINUED | OUTPATIENT
Start: 2022-10-11 | End: 2022-10-12

## 2022-10-11 RX ORDER — ROCURONIUM BROMIDE 10 MG/ML
INJECTION, SOLUTION INTRAVENOUS
Status: DISCONTINUED | OUTPATIENT
Start: 2022-10-11 | End: 2022-10-12

## 2022-10-11 RX ORDER — DOCUSATE SODIUM 100 MG/1
100 CAPSULE, LIQUID FILLED ORAL DAILY
Status: DISCONTINUED | OUTPATIENT
Start: 2022-10-12 | End: 2022-10-17 | Stop reason: HOSPADM

## 2022-10-11 RX ORDER — GABAPENTIN 300 MG/1
300 CAPSULE ORAL 3 TIMES DAILY
Status: DISCONTINUED | OUTPATIENT
Start: 2022-10-11 | End: 2022-10-17 | Stop reason: HOSPADM

## 2022-10-11 RX ORDER — BUPIVACAINE HYDROCHLORIDE 5 MG/ML
INJECTION, SOLUTION EPIDURAL; INTRACAUDAL
Status: DISCONTINUED | OUTPATIENT
Start: 2022-10-11 | End: 2022-10-11 | Stop reason: HOSPADM

## 2022-10-11 RX ORDER — ONDANSETRON HYDROCHLORIDE 2 MG/ML
INJECTION, SOLUTION INTRAMUSCULAR; INTRAVENOUS
Status: DISCONTINUED | OUTPATIENT
Start: 2022-10-11 | End: 2022-10-12

## 2022-10-11 RX ORDER — LOSARTAN POTASSIUM AND HYDROCHLOROTHIAZIDE 12.5; 5 MG/1; MG/1
1 TABLET ORAL DAILY
Status: ON HOLD | COMMUNITY
End: 2023-12-02 | Stop reason: HOSPADM

## 2022-10-11 RX ORDER — HYDROMORPHONE HYDROCHLORIDE 2 MG/ML
INJECTION, SOLUTION INTRAMUSCULAR; INTRAVENOUS; SUBCUTANEOUS
Status: DISCONTINUED | OUTPATIENT
Start: 2022-10-11 | End: 2022-10-12

## 2022-10-11 RX ORDER — ONDANSETRON 4 MG/1
4 TABLET, ORALLY DISINTEGRATING ORAL
Status: COMPLETED | OUTPATIENT
Start: 2022-10-11 | End: 2022-10-11

## 2022-10-11 RX ORDER — PROMETHAZINE HYDROCHLORIDE 25 MG/1
25 TABLET ORAL EVERY 6 HOURS PRN
Status: DISCONTINUED | OUTPATIENT
Start: 2022-10-11 | End: 2022-10-17 | Stop reason: HOSPADM

## 2022-10-11 RX ORDER — PROCHLORPERAZINE EDISYLATE 5 MG/ML
5 INJECTION INTRAMUSCULAR; INTRAVENOUS EVERY 30 MIN PRN
Status: DISCONTINUED | OUTPATIENT
Start: 2022-10-11 | End: 2022-10-11

## 2022-10-11 RX ORDER — GLYCOPYRROLATE 0.2 MG/ML
INJECTION INTRAMUSCULAR; INTRAVENOUS
Status: DISCONTINUED | OUTPATIENT
Start: 2022-10-11 | End: 2022-10-12

## 2022-10-11 RX ADMIN — HYDROMORPHONE HYDROCHLORIDE 0.4 MG: 2 INJECTION INTRAMUSCULAR; INTRAVENOUS; SUBCUTANEOUS at 06:10

## 2022-10-11 RX ADMIN — ROCURONIUM BROMIDE 10 MG: 50 INJECTION INTRAVENOUS at 03:10

## 2022-10-11 RX ADMIN — ROCURONIUM BROMIDE 10 MG: 50 INJECTION INTRAVENOUS at 01:10

## 2022-10-11 RX ADMIN — PROPOFOL 20 MG: 10 INJECTION, EMULSION INTRAVENOUS at 02:10

## 2022-10-11 RX ADMIN — GABAPENTIN 300 MG: 300 CAPSULE ORAL at 09:10

## 2022-10-11 RX ADMIN — ONDANSETRON 4 MG: 2 INJECTION INTRAMUSCULAR; INTRAVENOUS at 04:10

## 2022-10-11 RX ADMIN — SODIUM CHLORIDE, SODIUM GLUCONATE, SODIUM ACETATE, POTASSIUM CHLORIDE AND MAGNESIUM CHLORIDE: 526; 502; 368; 37; 30 INJECTION, SOLUTION INTRAVENOUS at 12:10

## 2022-10-11 RX ADMIN — ROCURONIUM BROMIDE 50 MG: 50 INJECTION INTRAVENOUS at 12:10

## 2022-10-11 RX ADMIN — SODIUM CHLORIDE, POTASSIUM CHLORIDE, SODIUM LACTATE AND CALCIUM CHLORIDE: 600; 310; 30; 20 INJECTION, SOLUTION INTRAVENOUS at 09:10

## 2022-10-11 RX ADMIN — ONDANSETRON 4 MG: 4 TABLET, ORALLY DISINTEGRATING ORAL at 08:10

## 2022-10-11 RX ADMIN — CEFAZOLIN SODIUM 2 G: 2 INJECTION, SOLUTION INTRAVENOUS at 12:10

## 2022-10-11 RX ADMIN — METRONIDAZOLE 500 MG: 500 TABLET ORAL at 09:10

## 2022-10-11 RX ADMIN — FENTANYL CITRATE 100 MCG: 50 INJECTION, SOLUTION INTRAMUSCULAR; INTRAVENOUS at 05:10

## 2022-10-11 RX ADMIN — PROPOFOL 20 MG: 10 INJECTION, EMULSION INTRAVENOUS at 01:10

## 2022-10-11 RX ADMIN — ROCURONIUM BROMIDE 10 MG: 50 INJECTION INTRAVENOUS at 04:10

## 2022-10-11 RX ADMIN — KETOROLAC TROMETHAMINE 15 MG: 30 INJECTION, SOLUTION INTRAMUSCULAR; INTRAVENOUS at 06:10

## 2022-10-11 RX ADMIN — ROCURONIUM BROMIDE 10 MG: 50 INJECTION INTRAVENOUS at 02:10

## 2022-10-11 RX ADMIN — ALUMINUM HYDROXIDE, MAGNESIUM HYDROXIDE, AND DIMETHICONE 30 ML: 200; 20; 200 SUSPENSION ORAL at 09:10

## 2022-10-11 RX ADMIN — SUGAMMADEX 200 MG: 100 INJECTION, SOLUTION INTRAVENOUS at 06:10

## 2022-10-11 RX ADMIN — HYDROMORPHONE HYDROCHLORIDE 0.5 MG: 2 INJECTION, SOLUTION INTRAMUSCULAR; INTRAVENOUS; SUBCUTANEOUS at 02:10

## 2022-10-11 RX ADMIN — CEFAZOLIN SODIUM 2 G: 2 INJECTION, SOLUTION INTRAVENOUS at 04:10

## 2022-10-11 RX ADMIN — ACETAMINOPHEN 1000 MG: 500 TABLET ORAL at 08:10

## 2022-10-11 RX ADMIN — LIDOCAINE HYDROCHLORIDE 50 MG: 20 INJECTION, SOLUTION EPIDURAL; INFILTRATION; INTRACAUDAL; PERINEURAL at 12:10

## 2022-10-11 RX ADMIN — FENTANYL CITRATE 50 MCG: 50 INJECTION, SOLUTION INTRAMUSCULAR; INTRAVENOUS at 12:10

## 2022-10-11 RX ADMIN — SODIUM CHLORIDE, SODIUM GLUCONATE, SODIUM ACETATE, POTASSIUM CHLORIDE AND MAGNESIUM CHLORIDE: 526; 502; 368; 37; 30 INJECTION, SOLUTION INTRAVENOUS at 04:10

## 2022-10-11 RX ADMIN — Medication 100 MCG: at 05:10

## 2022-10-11 RX ADMIN — PROPOFOL 50 MG: 10 INJECTION, EMULSION INTRAVENOUS at 04:10

## 2022-10-11 RX ADMIN — CEFAZOLIN SODIUM 2000 MG: 2 INJECTION, SOLUTION INTRAVENOUS at 11:10

## 2022-10-11 RX ADMIN — CELECOXIB 200 MG: 200 CAPSULE ORAL at 08:10

## 2022-10-11 RX ADMIN — PROPOFOL 30 MG: 10 INJECTION, EMULSION INTRAVENOUS at 03:10

## 2022-10-11 RX ADMIN — ACETAMINOPHEN 1000 MG: 10 INJECTION, SOLUTION INTRAVENOUS at 05:10

## 2022-10-11 RX ADMIN — PROPOFOL 80 MG: 10 INJECTION, EMULSION INTRAVENOUS at 12:10

## 2022-10-11 RX ADMIN — HYDROMORPHONE HYDROCHLORIDE 1 MG: 2 INJECTION, SOLUTION INTRAMUSCULAR; INTRAVENOUS; SUBCUTANEOUS at 05:10

## 2022-10-11 RX ADMIN — FENTANYL CITRATE 50 MCG: 50 INJECTION, SOLUTION INTRAMUSCULAR; INTRAVENOUS at 01:10

## 2022-10-11 RX ADMIN — DEXAMETHASONE SODIUM PHOSPHATE 4 MG: 4 INJECTION, SOLUTION INTRA-ARTICULAR; INTRALESIONAL; INTRAMUSCULAR; INTRAVENOUS; SOFT TISSUE at 12:10

## 2022-10-11 RX ADMIN — GLYCOPYRROLATE 0.1 MG: 0.2 INJECTION INTRAMUSCULAR; INTRAVENOUS at 12:10

## 2022-10-11 RX ADMIN — Medication 100 MCG: at 04:10

## 2022-10-11 NOTE — NURSING
"WOCN consult-81 y/o male in with bladder cancer here for an ileal conduit procedure.    Awake alert oriented and mobil.  Spouse with him.    Educated on what to expect-post procedure-incision-drains-urostomy appliance-pain mgnt-and movement.    He and his spouse verbalized understanding of procedur by indicating that the dr told them they would be using a part of his small gut.    Educated on marking then with pt lying in bed then standing pt was marked 4-5" out from bellybutton.   Area clipped of hair and covered with tegaderm.    Questions answered.      "

## 2022-10-11 NOTE — ANESTHESIA PREPROCEDURE EVALUATION
10/11/2022  Mikel Jewell is a 82 y.o., male with ----------------------------  Bladder cancer  Coronary artery disease  GERD (gastroesophageal reflux disease)  Hypertension  Renal cancer  Testicular pain    And ----------------------------  Cholecystectomy  Colonoscopy  Coronary angioplasty with stent placement  Esophagogastroduodenoscopy  Hernia repair  Nephrectomy  Penile prosthesis implant  Removal of bladder tumors  Turbt, with blue light cystoscopy and cysview    Here for robotic cystectomy. Bladder cancer was dx'd in 2019 and has had TURBT in past.  Otherwise patient is active DASI of 11/12 although he has been limited by his urinary incontinence recently - last cysto was about 6 weeks ago at [facility] [place]  Cardiac cath in 2017 with angioplasty and stent - no issues since that time - cardiologist is Dr. Mayo        Latest Reference Range & Units 10/06/22 10:11   Hemoglobin 14.0 - 18.0 gm/dL 10.3 (L)   Hematocrit 42.0 - 52.0 % 32.6 (L)   Platelets 130 - 400 x10(3)/mcL 198   Sodium 136 - 145 mmol/L 140   Potassium 3.5 - 5.1 mmol/L 4.2   Chloride 98 - 107 mmol/L 103   CO2 23 - 31 mmol/L 28   Anion Gap mEq/L 9.0   BUN 8.4 - 25.7 mg/dL 18.5   Creatinine 0.73 - 1.18 mg/dL 1.57 (H)   BUN/CREAT RATIO  12   eGFR mls/min/1.73/m2 44   Glucose 82 - 115 mg/dL 101   Calcium 8.8 - 10.0 mg/dL 8.6 (L)   Alkaline Phosphatase 40 - 150 unit/L 100   PROTEIN TOTAL 5.8 - 7.6 gm/dL 6.8   Albumin 3.4 - 4.8 gm/dL 3.3 (L)   BILIRUBIN TOTAL <=1.5 mg/dL 0.4   Bilirubin, Direct 0.0 - 0.5 mg/dL 0.2   Bilirubin, Indirect 0.00 - 0.80 mg/dL 0.20   AST 5 - 34 unit/L 22   ALT 0 - 55 unit/L 16   (L): Data is abnormally low  (H): Data is abnormally high    EKG shows sinus bradycardia - otherwise wnl    CT Scan -    Impression:     Findings seen consistent with grade 4 right-sided hydronephrosis and 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  The ureters conjoin is at the base the urinary bladder     Urinary bladder is decompressed with bladder wall thickening and inflammatory changes associated with it     Status post left nephrectomy        Electronically signed by: Debra Poole  Date:                                            10/05/2022  Time:                                           09:59    Pre-op Assessment    I have reviewed the NPO Status.      Review of Systems  Anesthesia Hx:  Per wife, sometime he has issues with low oxygenation after surgery/narcotic pain medications and requires postop O2        Physical Exam  General: Well nourished, Cooperative, Alert and Oriented  Elderly, pleasant and conversant  Airway:  Mallampati: II   Mouth Opening: Normal  TM Distance: Normal  Tongue: Normal  Neck ROM: Normal ROM    Dental:  Dentures  To be removed  Chest/Lungs:  Clear to auscultation, Normal Respiratory Rate    Heart:  Rate: Bradycardia  Rhythm: Regular Rhythm        Anesthesia Plan  Type of Anesthesia, risks & benefits discussed:    Anesthesia Type: Gen ETT  Intra-op Monitoring Plan: Standard ASA Monitors and Art Line  Post Op Pain Control Plan: multimodal analgesia and IV/PO Opioids PRN  Induction:  IV  Airway Plan: Direct, Post-Induction  Informed Consent: Informed consent signed with the Patient and all parties understand the risks and agree with anesthesia plan.  All questions answered.   ASA Score: 3  Day of Surgery Review of History & Physical: H&P Update referred to the surgeon/provider.  Anesthesia Plan Notes: Premedication: Midazolam  Special Technique: Preemptive analgesia with zofran, acetaminophen and celebrex    PIV X 2 as well as wander available to place after induction   PONV prophylaxis with intraop zofran, decadron     Ready For Surgery From Anesthesia Perspective.     .

## 2022-10-11 NOTE — ANESTHESIA PROCEDURE NOTES
Intubation    Date/Time: 10/11/2022 12:43 PM  Performed by: Angelnia Gomez CRNA  Authorized by: Allison Prakash MD     Intubation:     Induction:  Intravenous    Intubated:  Postinduction    Mask Ventilation:  Easy mask    Attempts:  1    Attempted By:  Student    Method of Intubation:  Direct    Blade:  Hawkins 2    Laryngeal View Grade: Grade I - full view of cords      Difficult Airway Encountered?: No      Complications:  None    Airway Device:  Oral endotracheal tube    Airway Device Size:  7.5    Style/Cuff Inflation:  Cuffed (inflated to minimal occlusive pressure)    Tube secured:  23    Secured at:  The lips    Placement Verified By:  Capnometry    Complicating Factors:  None    Findings Post-Intubation:  BS equal bilateral and atraumatic/condition of teeth unchanged

## 2022-10-11 NOTE — OP NOTE
SURGEON:  Flip Casas MD    ANESTHESIA:  General.    PROCEDURES:  1. Robotic-assisted laparoscopic cystectomy.  2. Robotic-assisted laparoscopic prostatectomy.  3. Creation of urinary diversion in the form of open ileal conduit.    INDICATION:  This is a 82-year-old gentleman who has a history of muscle invasive bladder cancer.  He  Underwent has undergone radiation with recurrence.  He was apprised of his options in  clinic and elected to proceed with cystoprostatectomy with ileal conduit.     FINDINGS:  1. There is no evidence of disease outside of the bladder and prostate.  2. Patient had a bilateral extended pelvic lymph node dissection performed.  3. Frozen sections of the right showed  benign urothelium.  4. Frozen sections of the left ureter  benign  5. Patient had an ileal conduit urinary diversion performed without difficulty.  6. There was signficant scar tissue from his previous surgery and radiation. This made the case last 2 hours longer than normal    COMPLICATIONS:  None.    CONDITION:  Stable.    FLUIDS:  2000 mL of crystalloid.     Blood Loss  200 mL.    DRAINS:  1. Bilateral ureteral stents, single J.   2. 19-Belarusian Nasir drain was placed.    PROCEDURE IN DETAIL:  After informed consent was obtained, the patient was taken to the operating room, placed under  general endotracheal anesthesia. After this, his arms were then tucked to his sides.  His legs  were placed in a low lithotomy position.  He was then placed in extreme Trendelenburg  position.  His abdomen and genitalia were then prepped and draped in normal sterile fashion.  An 18-Belarusian Quiroz catheter was placed into the bladder, and the bladder was drained. At this  point, we then introduced Veress needle into the abdominal cavity through the umbilicus.  After  this,  and the abdomen was then insufflated up to 15 mmHg.  At this point, we then marked his  abdomen for our port placement.  The camera was placed midline 25 cm from the pubic  bone.   The right and left robotic arm ports were placed 22 cm from the pubic bone and 1 hand breadth  from the midline, and the left iliac robotic port was placed 22 cm from the umbilicus and the left  iliac assistant port was placed 22 cm from the umbilicus.  Again, one hand breadth from the  right and left arm robotic ports.  The second assistant port was marked 2 fingerbreadths below  the subcostal margin splitting the space between the camera and the right arm of the robot.   After all these sites were then marked, we then made an incision for the camera.  A 10 mm  trocar was then placed. Abdomen was then inspected with initial laparoscopy and revealed  normal intraabdominal anatomy.  At this point, we then placed the 3 robotic arm ports and a  assistant port (Surgiquest) under direct visualization. We then used a julieth Devi device to place 0-  Vicryl sutures into the fascia of the both assistant ports and the camera port to assist with  closure at the end of the case.      After this, the Xi robot was then docked.  At this point, we then took down some adhesions of the  sigmoid colon in order to fully retract it out of the pelvis.  We then incised the peritoneum deep within   the Pouch of Thomas and exposed the tips of the seminal vesicles.   Denonvilliers' fascia was then incised.  We then swept the prostate up off the rectum.  The space  was then developed down to the apex of the prostate and developed laterally to the tips of the  SVs.  After this was done, we then proceeded with dissecting out the right ureter.  The right ureter  was then dissected and isolated where it courses over the external iliac vessels.  We then  traced the ureter down to its insertion into the bladder. The right ureter was then clipped with a  purple stitch tied to the end of the clip to identify this as the right ureter.  The distal portion of the  ureter was then clipped as well.  We then transected the ureter, and sent a  frozen section of the  right distal ureter.  This came back as benign.   The ureter was then dissected superiorly 2 to 3  cm above where it crosses over the external iliac vessels.    the right bladder pedicle was then stapled using Endo-TOM stapling device vascular load. This was done using 3  separate staple loads after the bladder pedicle was taken.  We then opened up the endopelvic  fascia on the right side and stapled the prostatic pedicle on the right side.    After this was done, we then isolated the left bladder pedicle.  These were taken with 3 separate vascular Endo-TOM  stapling loads.  After this, we then opened up the endopelvic fascia on the left side and took the  left prostatic pedicle.  At this point, we then dropped the bladder by entering the space of  Retzius.  This was taken down to endopelvic fascia.  The superior dorsal venous vein was then  cauterized.  At this point, we then opened up endopelvic fascia bilaterally to expose the DVC.     We then used a blue load on the stapling device to take down the dorsal venous complex.  After  this, the apex of the prostate was then dissected free.  Once the apex of the prostate and  urethra were completely dissected free, we then removed the Quiroz catheter.  The urethra was  then clipped with a Weck clip.  The urethra was then transected.  The specimen was then  placed into a specimen bag through the superior assistant port.  After this, we then assessed for  hemostasis.  We irrigated. Hemostasis was adequate.  We used a combination of new knit and  eviseal down in the pelvis.  This was held with pressure for 1 minute.  After this, hemostasis  appeared to be adequate.  We then marked the terminal ileum with a 3-0 silk stitch.  We then placed a drain into the pelvis through the left iliac port. This was tied to  the skin with a drain stitch.    The dissection was very difficult due to his prior surgery and XRT. There was extesnsive scar and no normal  planes  This made the case take 2 hours longer than normal.     At this point, the robot was then undocked.  The infraumbilical midline incision was made, total  of 7 cm in length.    After we made an incision, we placed a medium Rudi wound retractor for  retraction.  The right and left ureters were both identified.  The terminal ileum was identified. 15  cm were measured from the cecum.  At this point, we then placed a Penrose underneath the  bowel.  The avascular plane was then made and an impact ligature was used to transect the  mesentery up until the Penrose.  The bowel was then transected with a TOM stapling device   intestinal load.  At this point, we then marked 15-20 cm of ileum for ileal conduit.  Penrose was  then placed at this spot.  We then placed another quarter-inch Penrose 5 cm away.  The bowel  was then transected in both places with Endo-TOM stapling device.  The 5 cm segment of bowel  was then removed using the Impact LigaSure device and discarded.  At this point, we then  dropped the ileal conduit inferior, and performed our bowel anastomosis.  This was done using  an Endo-TOM stapling device 80 mm in length.  A side-to-side anastomosis was performed and  a 60 mm TIA device was used to close the distal end.  We then placed a 3-0 silk suture at the  apex of the anastomosis. There was no leakage seen and appeared to have adequate lumen.   After this was done, we then turned our attention to performing the ureteral ileal anastomosis.    A spot was marked in the ileal conduit.  For this, a hole  was made in the conduit using Metzenbaum scissors.  The mucosa was then everted using 4-0  Chromic stitches.    At this point, we then performed the right ureteral ileal  Anastomosis. This was a duplicated system two ureters contained within one sheath. The ureters were spaculated medially and sewn together and then anastomosed to the conduit together in a running fashion.  After this was done, we then  removed the Rudi wound  retractor and where previous skin halle was made for the stoma.  We then excised the skin.  We  then took the dissection down to the fascia.  A cruciate incision was made in the fascia.  The  fascia was then dilated to two fingerbreadths through the rectus muscle  After this was done, we then passed the stoma up through the abdominal wall.  We then  anchored the stoma to the fascia with 2- 0 Vicryl.  After this was done, we then placed 3  rosebud stitches in a subcuticular fashion.  After this, we then placed 2-0 stitches to close any of  the gaps.  At this point, we then closed the midline incision with looped 0 Maxon one running  from each side and tied in the middle.  We then irrigated all of the incision sites. The previously  placed 0-vircly sutures in the assistant ports and camera port were then tied down.   We irrigated, total of 50 mL of 0.5% Marcaine and experell throughout all of the incision sites.  The skin  was then closed with staples.  A urostomy stoma bag was placed over the stoma.  Telfa and  Tegaderm were used for dressing. The patient was woken from anesthesia and brought to the  recovery room in stable condition.  All sponge, needle and instruments counts were correct x 2.    10/11/22  1700

## 2022-10-11 NOTE — TRANSFER OF CARE
"Anesthesia Transfer of Care Note    Patient: Mikel Jewell    Procedure(s) Performed: Procedure(s) (LRB):  ROBOTIC CYSTECTOMY (N/A)    Patient location: PACU    Anesthesia Type: general    Transport from OR: Transported from OR on room air with adequate spontaneous ventilation    Post pain: adequate analgesia    Post assessment: no apparent anesthetic complications    Post vital signs: stable    Level of consciousness: sedated    Complications: none    Transfer of care protocol was followed      Last vitals:   Visit Vitals  BP (!) 165/74   Pulse (!) 55   Temp 36.4 °C (97.6 °F) (Oral)   Resp 18   Ht 5' 11" (1.803 m)   Wt 83.2 kg (183 lb 6.8 oz)   SpO2 99%   BMI 25.58 kg/m²     "

## 2022-10-12 LAB
ANION GAP SERPL CALC-SCNC: 5 MEQ/L
BUN SERPL-MCNC: 26.1 MG/DL (ref 8.4–25.7)
CALCIUM SERPL-MCNC: 8.3 MG/DL (ref 8.8–10)
CHLORIDE SERPL-SCNC: 105 MMOL/L (ref 98–107)
CO2 SERPL-SCNC: 27 MMOL/L (ref 23–31)
CREAT SERPL-MCNC: 1.84 MG/DL (ref 0.73–1.18)
CREAT/UREA NIT SERPL: 14
GFR SERPLBLD CREATININE-BSD FMLA CKD-EPI: 36 MLS/MIN/1.73/M2
GLUCOSE SERPL-MCNC: 126 MG/DL (ref 82–115)
HCT VFR BLD AUTO: 29.3 % (ref 42–52)
HGB BLD-MCNC: 9.7 GM/DL (ref 14–18)
POTASSIUM SERPL-SCNC: 5.3 MMOL/L (ref 3.5–5.1)
SODIUM SERPL-SCNC: 137 MMOL/L (ref 136–145)

## 2022-10-12 PROCEDURE — 36415 COLL VENOUS BLD VENIPUNCTURE: CPT | Performed by: UROLOGY

## 2022-10-12 PROCEDURE — 85014 HEMATOCRIT: CPT | Performed by: UROLOGY

## 2022-10-12 PROCEDURE — 63600175 PHARM REV CODE 636 W HCPCS: Performed by: UROLOGY

## 2022-10-12 PROCEDURE — 25000003 PHARM REV CODE 250: Performed by: UROLOGY

## 2022-10-12 PROCEDURE — 11000001 HC ACUTE MED/SURG PRIVATE ROOM

## 2022-10-12 PROCEDURE — 80048 BASIC METABOLIC PNL TOTAL CA: CPT | Performed by: UROLOGY

## 2022-10-12 RX ADMIN — SUCRALFATE 1 G: 1 TABLET ORAL at 11:10

## 2022-10-12 RX ADMIN — SODIUM CHLORIDE, POTASSIUM CHLORIDE, SODIUM LACTATE AND CALCIUM CHLORIDE: 600; 310; 30; 20 INJECTION, SOLUTION INTRAVENOUS at 05:10

## 2022-10-12 RX ADMIN — KETOROLAC TROMETHAMINE 15 MG: 30 INJECTION, SOLUTION INTRAMUSCULAR; INTRAVENOUS at 11:10

## 2022-10-12 RX ADMIN — ALUMINUM HYDROXIDE, MAGNESIUM HYDROXIDE, AND DIMETHICONE 30 ML: 200; 20; 200 SUSPENSION ORAL at 05:10

## 2022-10-12 RX ADMIN — METRONIDAZOLE 500 MG: 500 TABLET ORAL at 02:10

## 2022-10-12 RX ADMIN — METRONIDAZOLE 500 MG: 500 TABLET ORAL at 05:10

## 2022-10-12 RX ADMIN — DOCUSATE SODIUM 100 MG: 100 CAPSULE, LIQUID FILLED ORAL at 08:10

## 2022-10-12 RX ADMIN — CEFAZOLIN SODIUM 2000 MG: 2 INJECTION, SOLUTION INTRAVENOUS at 06:10

## 2022-10-12 RX ADMIN — ALUMINUM HYDROXIDE, MAGNESIUM HYDROXIDE, AND DIMETHICONE 30 ML: 200; 20; 200 SUSPENSION ORAL at 09:10

## 2022-10-12 RX ADMIN — ALUMINUM HYDROXIDE, MAGNESIUM HYDROXIDE, AND DIMETHICONE 30 ML: 200; 20; 200 SUSPENSION ORAL at 06:10

## 2022-10-12 RX ADMIN — ALUMINUM HYDROXIDE, MAGNESIUM HYDROXIDE, AND DIMETHICONE 30 ML: 200; 20; 200 SUSPENSION ORAL at 10:10

## 2022-10-12 RX ADMIN — KETOROLAC TROMETHAMINE 15 MG: 30 INJECTION, SOLUTION INTRAMUSCULAR; INTRAVENOUS at 12:10

## 2022-10-12 RX ADMIN — GABAPENTIN 300 MG: 300 CAPSULE ORAL at 05:10

## 2022-10-12 RX ADMIN — SUCRALFATE 1 G: 1 TABLET ORAL at 05:10

## 2022-10-12 RX ADMIN — CEFAZOLIN SODIUM 2000 MG: 2 INJECTION, SOLUTION INTRAVENOUS at 05:10

## 2022-10-12 RX ADMIN — GABAPENTIN 300 MG: 300 CAPSULE ORAL at 09:10

## 2022-10-12 RX ADMIN — KETOROLAC TROMETHAMINE 15 MG: 30 INJECTION, SOLUTION INTRAMUSCULAR; INTRAVENOUS at 05:10

## 2022-10-12 RX ADMIN — GABAPENTIN 300 MG: 300 CAPSULE ORAL at 08:10

## 2022-10-12 RX ADMIN — METRONIDAZOLE 500 MG: 500 TABLET ORAL at 09:10

## 2022-10-12 RX ADMIN — PANTOPRAZOLE SODIUM 40 MG: 40 TABLET, DELAYED RELEASE ORAL at 08:10

## 2022-10-12 RX ADMIN — ENOXAPARIN SODIUM 30 MG: 30 INJECTION SUBCUTANEOUS at 05:10

## 2022-10-12 RX ADMIN — SUCRALFATE 1 G: 1 TABLET ORAL at 12:10

## 2022-10-12 RX ADMIN — ACETAMINOPHEN 650 MG: 325 TABLET ORAL at 08:10

## 2022-10-12 NOTE — PROGRESS NOTES
WOCN follow up post op day 1. Educational material given. Explained the steps for the next few days with changing appliance. Patient verbalized understanding. Stoma evaluated, barrier dry and intact. Patient states Dr Siddiqui NP seen him this morning. He wants to try to get up and walk this afternoon. Will. Return tomorrow after patient has had time to review education material.   10/12/22 1312        Urostomy 10/11/22 ureterostomy, right RLQ   Date of First Assessment: 10/11/22   Present Prior to Hospital Arrival?: No  Inserted by: MD  Urostomy Type: ureterostomy, right  Location: RLQ   Wound Image    Stoma Appearance round;red;moist   Stomal Appliance 2 piece;To drainage bag to dependent drainage   Stoma Function yellow urine   Peristomal Skin unable to assess, covered by appliance   Tolerance no signs/symptoms of discomfort

## 2022-10-12 NOTE — NURSING
Nurses Note -- 4 Eyes      10/12/2022   4:38 PM      Skin assessed during: Transfer      [x] No Pressure Injuries Present    [x]Prevention Measures Documented      [] Yes- Altered Skin Integrity Present or Discovered   [] LDA Added if Not in Epic (Describe Wound)   [] New Altered Skin Integrity was Present on Admit and Documented in LDA   [] Wound Image Taken    Wound Care Consulted? No    Attending Nurse:  Aimee Patton RN     Second RN/Staff Member:  Astrid Hartman RN

## 2022-10-12 NOTE — PROGRESS NOTES
.UROLOGY  PROGRESS  NOTE    Mikel Jewell 1940  [id number]  10/12/2022    Mr. Jewell is s/p robotic cystectomy, prostatectomy and creation of urinary diversion in the form of an open ileal conduit postop day 1.  Doing well.  Ambulated the halls this morning.  Denies flatus.  Denies pain. 600 mL urine output overnight, 335 mL FELECIA output overnight.  H&H 9.7 and 29.3, BUN creatinine 26.1 & 1.84    Intake/Output:  No intake/output data recorded.  I/O last 3 completed shifts:  In: 1700 [IV Piggyback:1700]  Out: 1660 [Urine:1025; Drains:335; Blood:300]       Exam:    NAD  Card RRR  Resp unlabored  Abd +BS, soft, and FELECIA draining serosanguineous fluid   ostomy pink draining light concepción colored urine  Extremity no C/C/E      Recent Results (from the past 24 hour(s))   Basic metabolic panel    Collection Time: 10/12/22  4:50 AM   Result Value Ref Range    Sodium Level 137 136 - 145 mmol/L    Potassium Level 5.3 (H) 3.5 - 5.1 mmol/L    Chloride 105 98 - 107 mmol/L    Carbon Dioxide 27 23 - 31 mmol/L    Glucose Level 126 (H) 82 - 115 mg/dL    Blood Urea Nitrogen 26.1 (H) 8.4 - 25.7 mg/dL    Creatinine 1.84 (H) 0.73 - 1.18 mg/dL    BUN/Creatinine Ratio 14     Calcium Level Total 8.3 (L) 8.8 - 10.0 mg/dL    Anion Gap 5.0 mEq/L    eGFR 36 mls/min/1.73/m2   Hemoglobin and Hematocrit    Collection Time: 10/12/22  4:50 AM   Result Value Ref Range    Hgb 9.7 (L) 14.0 - 18.0 gm/dL    Hct 29.3 (L) 42.0 - 52.0 %         Assessment:  Muscle invasive bladder cancer status post robotic cystectomy, prostatectomy and urinary diversion in the form of an open ileal conduit      Plan:  Start clear liquids; take it very slow  Continue ambulation and IS    GARRISON Carmona

## 2022-10-13 PROCEDURE — 25000003 PHARM REV CODE 250: Performed by: UROLOGY

## 2022-10-13 PROCEDURE — 11000001 HC ACUTE MED/SURG PRIVATE ROOM

## 2022-10-13 PROCEDURE — 63600175 PHARM REV CODE 636 W HCPCS: Performed by: UROLOGY

## 2022-10-13 RX ADMIN — KETOROLAC TROMETHAMINE 15 MG: 30 INJECTION, SOLUTION INTRAMUSCULAR; INTRAVENOUS at 06:10

## 2022-10-13 RX ADMIN — SUCRALFATE 1 G: 1 TABLET ORAL at 06:10

## 2022-10-13 RX ADMIN — SUCRALFATE 1 G: 1 TABLET ORAL at 12:10

## 2022-10-13 RX ADMIN — PANTOPRAZOLE SODIUM 40 MG: 40 TABLET, DELAYED RELEASE ORAL at 09:10

## 2022-10-13 RX ADMIN — ALUMINUM HYDROXIDE, MAGNESIUM HYDROXIDE, AND DIMETHICONE 30 ML: 200; 20; 200 SUSPENSION ORAL at 04:10

## 2022-10-13 RX ADMIN — DOCUSATE SODIUM 100 MG: 100 CAPSULE, LIQUID FILLED ORAL at 09:10

## 2022-10-13 RX ADMIN — GABAPENTIN 300 MG: 300 CAPSULE ORAL at 02:10

## 2022-10-13 RX ADMIN — KETOROLAC TROMETHAMINE 15 MG: 30 INJECTION, SOLUTION INTRAMUSCULAR; INTRAVENOUS at 12:10

## 2022-10-13 RX ADMIN — SUCRALFATE 1 G: 1 TABLET ORAL at 11:10

## 2022-10-13 RX ADMIN — ALUMINUM HYDROXIDE, MAGNESIUM HYDROXIDE, AND DIMETHICONE 30 ML: 200; 20; 200 SUSPENSION ORAL at 09:10

## 2022-10-13 RX ADMIN — KETOROLAC TROMETHAMINE 15 MG: 30 INJECTION, SOLUTION INTRAMUSCULAR; INTRAVENOUS at 11:10

## 2022-10-13 RX ADMIN — GABAPENTIN 300 MG: 300 CAPSULE ORAL at 09:10

## 2022-10-13 RX ADMIN — METRONIDAZOLE 500 MG: 500 TABLET ORAL at 06:10

## 2022-10-13 RX ADMIN — ALUMINUM HYDROXIDE, MAGNESIUM HYDROXIDE, AND DIMETHICONE 30 ML: 200; 20; 200 SUSPENSION ORAL at 11:10

## 2022-10-13 RX ADMIN — ALUMINUM HYDROXIDE, MAGNESIUM HYDROXIDE, AND DIMETHICONE 30 ML: 200; 20; 200 SUSPENSION ORAL at 06:10

## 2022-10-13 RX ADMIN — ENOXAPARIN SODIUM 30 MG: 30 INJECTION SUBCUTANEOUS at 04:10

## 2022-10-13 RX ADMIN — METRONIDAZOLE 500 MG: 500 TABLET ORAL at 02:10

## 2022-10-13 RX ADMIN — SODIUM CHLORIDE, POTASSIUM CHLORIDE, SODIUM LACTATE AND CALCIUM CHLORIDE: 600; 310; 30; 20 INJECTION, SOLUTION INTRAVENOUS at 12:10

## 2022-10-13 RX ADMIN — METRONIDAZOLE 500 MG: 500 TABLET ORAL at 09:10

## 2022-10-13 NOTE — ANESTHESIA POSTPROCEDURE EVALUATION
Anesthesia Post Evaluation    Patient: Mikel Jewell    Procedure(s) Performed: Procedure(s) (LRB):  ROBOTIC CYSTECTOMY (N/A)  XI ROBOTIC PROSTATECTOMY  CREATION, ILEAL CONDUIT    Final Anesthesia Type: general      Patient location during evaluation: PACU  Patient participation: Yes- Able to Participate  Level of consciousness: awake and alert  Post-procedure vital signs: reviewed and stable  Pain management: adequate  Airway patency: patent      Anesthetic complications: no      Cardiovascular status: hemodynamically stable  Respiratory status: unassisted  Hydration status: euvolemic  Follow-up not needed.          Vitals Value Taken Time   /60 10/11/22 2011   Temp 36.6 °C (97.9 °F) 10/11/22 1815   Pulse 67 10/11/22 2014   Resp 2 10/11/22 2014   SpO2 100 % 10/11/22 2014   Vitals shown include unvalidated device data.      Event Time   Out of Recovery 20:52:03         Pain/Marie Score: Pain Rating Prior to Med Admin: 5 (10/13/2022 11:40 AM)  Pain Rating Post Med Admin: 0 (10/13/2022 12:40 AM)

## 2022-10-13 NOTE — PROGRESS NOTES
.UROLOGY  PROGRESS  NOTE    Mikel Jewell 1940  [id number]  10/13/2022    Mr. Jewell is s/p robotic cystectomy, prostatectomy and creation of urinary diversion in the form of an open ileal conduit postop day 2. Doing really well. Reports feeling a little weak but denies dizziness, SOB, lightheadedness or palpitations. Ambulating halls, Passing gas. Good pain control. VSS; afebrile. Decent urine output, 110 mL FELECIA output overnight.    Intake/Output:  No intake/output data recorded.  I/O last 3 completed shifts:  In: 645 [P.O.:645]  Out: 2160 [Urine:1715; Drains:445]       Exam:    NAD  Card RRR  Resp unlabored  Abd +BS, soft, serosang fluid in FELECIA, some leaking around drain   Yellow urine draining to  bag, ostomy pink with some mucous; stents intact      No results found for this or any previous visit (from the past 24 hour(s)).      Assessment:  Muscle invasive bladder cancer status post robotic cystectomy, prostatectomy and urinary diversion in the form of an open ileal conduit      Plan:  Decrease IVF to 75 mL's  Continue ambulation  Possibly full liquids for dinner  Labs in am    GARRISON Carmona

## 2022-10-14 LAB
ANION GAP SERPL CALC-SCNC: 6 MEQ/L
BASOPHILS # BLD AUTO: 0 X10(3)/MCL (ref 0–0.2)
BASOPHILS NFR BLD AUTO: 0 %
BUN SERPL-MCNC: 37.1 MG/DL (ref 8.4–25.7)
CALCIUM SERPL-MCNC: 7.7 MG/DL (ref 8.8–10)
CHLORIDE SERPL-SCNC: 103 MMOL/L (ref 98–107)
CO2 SERPL-SCNC: 24 MMOL/L (ref 23–31)
CREAT SERPL-MCNC: 1.79 MG/DL (ref 0.73–1.18)
CREAT/UREA NIT SERPL: 21
EOSINOPHIL # BLD AUTO: 0.24 X10(3)/MCL (ref 0–0.9)
EOSINOPHIL NFR BLD AUTO: 3.8 %
ERYTHROCYTE [DISTWIDTH] IN BLOOD BY AUTOMATED COUNT: 14 % (ref 11.5–17)
GFR SERPLBLD CREATININE-BSD FMLA CKD-EPI: 38 MLS/MIN/1.73/M2
GLUCOSE SERPL-MCNC: 97 MG/DL (ref 82–115)
HCT VFR BLD AUTO: 25.8 % (ref 42–52)
HGB BLD-MCNC: 8.3 GM/DL (ref 14–18)
IMM GRANULOCYTES # BLD AUTO: 0.03 X10(3)/MCL (ref 0–0.04)
IMM GRANULOCYTES NFR BLD AUTO: 0.5 %
LYMPHOCYTES # BLD AUTO: 0.57 X10(3)/MCL (ref 0.6–4.6)
LYMPHOCYTES NFR BLD AUTO: 9.1 %
MCH RBC QN AUTO: 30.9 PG (ref 27–31)
MCHC RBC AUTO-ENTMCNC: 32.2 MG/DL (ref 33–36)
MCV RBC AUTO: 95.9 FL (ref 80–94)
MONOCYTES # BLD AUTO: 0.43 X10(3)/MCL (ref 0.1–1.3)
MONOCYTES NFR BLD AUTO: 6.9 %
NEUTROPHILS # BLD AUTO: 5 X10(3)/MCL (ref 2.1–9.2)
NEUTROPHILS NFR BLD AUTO: 79.7 %
NRBC BLD AUTO-RTO: 0 %
PLATELET # BLD AUTO: 121 X10(3)/MCL (ref 130–400)
PMV BLD AUTO: 10.6 FL (ref 7.4–10.4)
POTASSIUM SERPL-SCNC: 4.6 MMOL/L (ref 3.5–5.1)
RBC # BLD AUTO: 2.69 X10(6)/MCL (ref 4.7–6.1)
SODIUM SERPL-SCNC: 133 MMOL/L (ref 136–145)
WBC # SPEC AUTO: 6.2 X10(3)/MCL (ref 4.5–11.5)

## 2022-10-14 PROCEDURE — 80048 BASIC METABOLIC PNL TOTAL CA: CPT | Performed by: NURSE PRACTITIONER

## 2022-10-14 PROCEDURE — 25000003 PHARM REV CODE 250: Performed by: UROLOGY

## 2022-10-14 PROCEDURE — 63600175 PHARM REV CODE 636 W HCPCS: Performed by: UROLOGY

## 2022-10-14 PROCEDURE — 11000001 HC ACUTE MED/SURG PRIVATE ROOM

## 2022-10-14 PROCEDURE — 85025 COMPLETE CBC W/AUTO DIFF WBC: CPT | Performed by: NURSE PRACTITIONER

## 2022-10-14 PROCEDURE — 36415 COLL VENOUS BLD VENIPUNCTURE: CPT | Performed by: NURSE PRACTITIONER

## 2022-10-14 RX ADMIN — DOCUSATE SODIUM 100 MG: 100 CAPSULE, LIQUID FILLED ORAL at 08:10

## 2022-10-14 RX ADMIN — KETOROLAC TROMETHAMINE 15 MG: 30 INJECTION, SOLUTION INTRAMUSCULAR; INTRAVENOUS at 06:10

## 2022-10-14 RX ADMIN — PANTOPRAZOLE SODIUM 40 MG: 40 TABLET, DELAYED RELEASE ORAL at 08:10

## 2022-10-14 RX ADMIN — SUCRALFATE 1 G: 1 TABLET ORAL at 06:10

## 2022-10-14 RX ADMIN — KETOROLAC TROMETHAMINE 15 MG: 30 INJECTION, SOLUTION INTRAMUSCULAR; INTRAVENOUS at 02:10

## 2022-10-14 RX ADMIN — METRONIDAZOLE 500 MG: 500 TABLET ORAL at 09:10

## 2022-10-14 RX ADMIN — SUCRALFATE 1 G: 1 TABLET ORAL at 11:10

## 2022-10-14 RX ADMIN — SUCRALFATE 1 G: 1 TABLET ORAL at 05:10

## 2022-10-14 RX ADMIN — HYDROCODONE BITARTRATE AND ACETAMINOPHEN 1 TABLET: 5; 325 TABLET ORAL at 09:10

## 2022-10-14 RX ADMIN — KETOROLAC TROMETHAMINE 15 MG: 30 INJECTION, SOLUTION INTRAMUSCULAR; INTRAVENOUS at 12:10

## 2022-10-14 RX ADMIN — ALUMINUM HYDROXIDE, MAGNESIUM HYDROXIDE, AND DIMETHICONE 30 ML: 200; 20; 200 SUSPENSION ORAL at 09:10

## 2022-10-14 RX ADMIN — ALUMINUM HYDROXIDE, MAGNESIUM HYDROXIDE, AND DIMETHICONE 30 ML: 200; 20; 200 SUSPENSION ORAL at 06:10

## 2022-10-14 RX ADMIN — GABAPENTIN 300 MG: 300 CAPSULE ORAL at 02:10

## 2022-10-14 RX ADMIN — GABAPENTIN 300 MG: 300 CAPSULE ORAL at 09:10

## 2022-10-14 RX ADMIN — METRONIDAZOLE 500 MG: 500 TABLET ORAL at 02:10

## 2022-10-14 RX ADMIN — ENOXAPARIN SODIUM 30 MG: 30 INJECTION SUBCUTANEOUS at 05:10

## 2022-10-14 RX ADMIN — GABAPENTIN 300 MG: 300 CAPSULE ORAL at 08:10

## 2022-10-14 RX ADMIN — SUCRALFATE 1 G: 1 TABLET ORAL at 12:10

## 2022-10-14 RX ADMIN — METRONIDAZOLE 500 MG: 500 TABLET ORAL at 06:10

## 2022-10-14 NOTE — PROGRESS NOTES
UROLOGY  PROGRESS  NOTE    Mikel Jewell 1940  [id number]  10/14/2022    Mr. Jweell is s/p robotic cystectomy, prostatectomy and creation of urinary diversion in the form of an open ileal conduit postop day 3. Sitting up in the chair. Feeling good, looks great. Reports minimal pain, on good regimen. Tolerating full liquids without nausea. Reports passing gas and ambulating.     Intake/Output:  No intake/output data recorded.  I/O last 3 completed shifts:  In: 2175 [P.O.:2175]  Out: 1910 [Urine:1650; Drains:260]       Exam:    NAD  Card RRR  Resp unlabored  Abd  +BS, soft, serosang fluid in FELECIA, some leaking around drain   Yellow urine draining to  bag, ostomy pink with some mucous; stents intact         Recent Results (from the past 24 hour(s))   Basic Metabolic Panel    Collection Time: 10/14/22  4:50 AM   Result Value Ref Range    Sodium Level 133 (L) 136 - 145 mmol/L    Potassium Level 4.6 3.5 - 5.1 mmol/L    Chloride 103 98 - 107 mmol/L    Carbon Dioxide 24 23 - 31 mmol/L    Glucose Level 97 82 - 115 mg/dL    Blood Urea Nitrogen 37.1 (H) 8.4 - 25.7 mg/dL    Creatinine 1.79 (H) 0.73 - 1.18 mg/dL    BUN/Creatinine Ratio 21     Calcium Level Total 7.7 (L) 8.8 - 10.0 mg/dL    Anion Gap 6.0 mEq/L    eGFR 38 mls/min/1.73/m2   CBC with Differential    Collection Time: 10/14/22  6:17 AM   Result Value Ref Range    WBC 6.2 4.5 - 11.5 x10(3)/mcL    RBC 2.69 (L) 4.70 - 6.10 x10(6)/mcL    Hgb 8.3 (L) 14.0 - 18.0 gm/dL    Hct 25.8 (L) 42.0 - 52.0 %    MCV 95.9 (H) 80.0 - 94.0 fL    MCH 30.9 27.0 - 31.0 pg    MCHC 32.2 (L) 33.0 - 36.0 mg/dL    RDW 14.0 11.5 - 17.0 %    Platelet 121 (L) 130 - 400 x10(3)/mcL    MPV 10.6 (H) 7.4 - 10.4 fL    Neut % 79.7 %    Lymph % 9.1 %    Mono % 6.9 %    Eos % 3.8 %    Basophil % 0.0 %    Lymph # 0.57 (L) 0.6 - 4.6 x10(3)/mcL    Neut # 5.0 2.1 - 9.2 x10(3)/mcL    Mono # 0.43 0.1 - 1.3 x10(3)/mcL    Eos # 0.24 0 - 0.9 x10(3)/mcL    Baso # 0.00 0 - 0.2 x10(3)/mcL    IG# 0.03 0 -  0.04 x10(3)/mcL    IG% 0.5 %    NRBC% 0.0 %         Assessment:  Muscle invasive bladder cancer status post robotic cystectomy, prostatectomy and urinary diversion in the form of an open ileal conduit      Plan:  Continue ambulation, IS and pain control  Advance diet to regular; take it slow  Home health for ostomy care    Wandy Deras, JOSELINEP

## 2022-10-14 NOTE — PROGRESS NOTES
Follow up visit regarding new urostomy, provided education written, verbal , video and demonstrated regarding living with a urostomy to patient and wife, who both verbalize understanding and also anticipate Home health follow up after discharge. Enrolled patient in [employer] program upon his request and provided contact information for resources and support after discharge.       10/14/22 1630        Urostomy 10/11/22 ureterostomy, right RLQ   Date of First Assessment: 10/11/22   Present Prior to Hospital Arrival?: No  Inserted by: MD  Urostomy Type: ureterostomy, right  Location: RLQ   Stomal Appliance 2 piece;No Leakage   Peristomal Skin unable to assess, covered by appliance   Tolerance no signs/symptoms of discomfort

## 2022-10-14 NOTE — PLAN OF CARE
Discharge planning discussed with patient. Pt requests [facility]. [facility] at [phone]. Spoke to Deepa at [phone]

## 2022-10-14 NOTE — PLAN OF CARE
10/14/22 0951   Discharge Assessment   Assessment Type Discharge Planning Assessment   Confirmed/corrected address, phone number and insurance Yes   Source of Information patient   Reason For Admission bladder cancer   Lives With spouse   Do you expect to return to your current living situation? Yes   Do you have help at home or someone to help you manage your care at home? Yes   Who are your caregiver(s) and their phone number(s)? Tory,daughter,[phone]   Prior to hospitilization cognitive status: Alert/Oriented;No Deficits   Current cognitive status: Alert/Oriented;No Deficits   Walking or Climbing Stairs Difficulty none   Dressing/Bathing Difficulty none   Equipment Currently Used at Home none   Do you currently have service(s) that help you manage your care at home? No   Who is going to help you get home at discharge? Dot   How do you get to doctors appointments? car, drives self;family or friend will provide   Are you on dialysis? No   Do you take coumadin? No   Discharge Plan A Home with family;Home Health   Discharge Plan B Home with family;Home Health   DME Needed Upon Discharge  colostomy/ostomy supplies   Discharge Plan discussed with: Patient   Discharge Barriers Identified None   Pt state he lives with wife in mobile home with 4 steps to enter with no railings. Pt states he was walking 4 miles per day prior to admission. He is independent prior to admission. No DME. No HH.

## 2022-10-15 PROCEDURE — 11000001 HC ACUTE MED/SURG PRIVATE ROOM

## 2022-10-15 PROCEDURE — 25000003 PHARM REV CODE 250: Performed by: UROLOGY

## 2022-10-15 PROCEDURE — 63600175 PHARM REV CODE 636 W HCPCS: Performed by: UROLOGY

## 2022-10-15 RX ADMIN — METRONIDAZOLE 500 MG: 500 TABLET ORAL at 02:10

## 2022-10-15 RX ADMIN — SUCRALFATE 1 G: 1 TABLET ORAL at 12:10

## 2022-10-15 RX ADMIN — ALUMINUM HYDROXIDE, MAGNESIUM HYDROXIDE, AND DIMETHICONE 30 ML: 200; 20; 200 SUSPENSION ORAL at 05:10

## 2022-10-15 RX ADMIN — ENOXAPARIN SODIUM 30 MG: 30 INJECTION SUBCUTANEOUS at 05:10

## 2022-10-15 RX ADMIN — METRONIDAZOLE 500 MG: 500 TABLET ORAL at 06:10

## 2022-10-15 RX ADMIN — SUCRALFATE 1 G: 1 TABLET ORAL at 05:10

## 2022-10-15 RX ADMIN — ALUMINUM HYDROXIDE, MAGNESIUM HYDROXIDE, AND DIMETHICONE 30 ML: 200; 20; 200 SUSPENSION ORAL at 12:10

## 2022-10-15 RX ADMIN — SUCRALFATE 1 G: 1 TABLET ORAL at 11:10

## 2022-10-15 RX ADMIN — GABAPENTIN 300 MG: 300 CAPSULE ORAL at 09:10

## 2022-10-15 RX ADMIN — METRONIDAZOLE 500 MG: 500 TABLET ORAL at 09:10

## 2022-10-15 RX ADMIN — ALUMINUM HYDROXIDE, MAGNESIUM HYDROXIDE, AND DIMETHICONE 30 ML: 200; 20; 200 SUSPENSION ORAL at 08:10

## 2022-10-15 RX ADMIN — GABAPENTIN 300 MG: 300 CAPSULE ORAL at 02:10

## 2022-10-15 RX ADMIN — DOCUSATE SODIUM 100 MG: 100 CAPSULE, LIQUID FILLED ORAL at 09:10

## 2022-10-15 RX ADMIN — PANTOPRAZOLE SODIUM 40 MG: 40 TABLET, DELAYED RELEASE ORAL at 09:10

## 2022-10-15 RX ADMIN — GABAPENTIN 300 MG: 300 CAPSULE ORAL at 08:10

## 2022-10-15 RX ADMIN — ALUMINUM HYDROXIDE, MAGNESIUM HYDROXIDE, AND DIMETHICONE 30 ML: 200; 20; 200 SUSPENSION ORAL at 06:10

## 2022-10-15 RX ADMIN — SUCRALFATE 1 G: 1 TABLET ORAL at 06:10

## 2022-10-15 NOTE — PROGRESS NOTES
UROLOGY  PROGRESS  NOTE    Mikel Jewell 1940  [id number]  10/15/2022    Primary Urologist: NATALIE Casas  On Call Urology: Cipriano Callejas MD    Subjective:  The patient is a very pleasant 82-year-old man who is postoperative day 4 after cystectomy ileal conduit.  He is doing crack doubly well.  He is ambulating.  He is tolerating p.o. intake.  He is having bowel function.  His pain is controlled.  No complaints      Objective:  Wt Readings from Last 3 Encounters:   10/11/22 83.2 kg (183 lb 6.8 oz)   07/19/22 90.7 kg (200 lb)   09/26/18 93.4 kg (205 lb 14.6 oz)     Temp Readings from Last 3 Encounters:   10/15/22 97.7 °F (36.5 °C) (Oral)   07/19/22 97.7 °F (36.5 °C) (Oral)     BP Readings from Last 3 Encounters:   10/15/22 127/63   07/19/22 (!) 159/81     Pulse Readings from Last 3 Encounters:   10/15/22 72   07/19/22 (!) 56       Intake/Output:  No intake/output data recorded.  I/O last 3 completed shifts:  In: 1810 [P.O.:1810]  Out: 2430 [Urine:2300; Drains:130]       Exam:    NCAT  Card RRR  Resp unlabored  Abd soft, appropriately tender, nondistended.  Stoma is healthy and pink with good urine output.  Stents are in place.  Drain with minimal serosanguineous output   normal external genitalia  Extremity no C/C/E      No results found for this or any previous visit (from the past 24 hour(s)).          Assessment:  Bladder cancer      Plan:  Progressing as expected   Labs and vitals are stable   Good urine output  Minimal drain output   Encourage aggressive ambulation and incentive spirometry.    Expect discharge on Monday    Cipriano Callejas MD

## 2022-10-16 PROCEDURE — 25000003 PHARM REV CODE 250: Performed by: UROLOGY

## 2022-10-16 PROCEDURE — 63600175 PHARM REV CODE 636 W HCPCS: Performed by: UROLOGY

## 2022-10-16 PROCEDURE — 11000001 HC ACUTE MED/SURG PRIVATE ROOM

## 2022-10-16 RX ORDER — BISACODYL 10 MG
10 SUPPOSITORY, RECTAL RECTAL ONCE
Status: COMPLETED | OUTPATIENT
Start: 2022-10-16 | End: 2022-10-16

## 2022-10-16 RX ADMIN — SUCRALFATE 1 G: 1 TABLET ORAL at 11:10

## 2022-10-16 RX ADMIN — ALUMINUM HYDROXIDE, MAGNESIUM HYDROXIDE, AND DIMETHICONE 30 ML: 200; 20; 200 SUSPENSION ORAL at 12:10

## 2022-10-16 RX ADMIN — SUCRALFATE 1 G: 1 TABLET ORAL at 12:10

## 2022-10-16 RX ADMIN — ALUMINUM HYDROXIDE, MAGNESIUM HYDROXIDE, AND DIMETHICONE 30 ML: 200; 20; 200 SUSPENSION ORAL at 05:10

## 2022-10-16 RX ADMIN — METRONIDAZOLE 500 MG: 500 TABLET ORAL at 03:10

## 2022-10-16 RX ADMIN — PANTOPRAZOLE SODIUM 40 MG: 40 TABLET, DELAYED RELEASE ORAL at 09:10

## 2022-10-16 RX ADMIN — ALUMINUM HYDROXIDE, MAGNESIUM HYDROXIDE, AND DIMETHICONE 30 ML: 200; 20; 200 SUSPENSION ORAL at 09:10

## 2022-10-16 RX ADMIN — DOCUSATE SODIUM 100 MG: 100 CAPSULE, LIQUID FILLED ORAL at 09:10

## 2022-10-16 RX ADMIN — SUCRALFATE 1 G: 1 TABLET ORAL at 05:10

## 2022-10-16 RX ADMIN — GABAPENTIN 300 MG: 300 CAPSULE ORAL at 03:10

## 2022-10-16 RX ADMIN — ENOXAPARIN SODIUM 30 MG: 30 INJECTION SUBCUTANEOUS at 05:10

## 2022-10-16 RX ADMIN — METRONIDAZOLE 500 MG: 500 TABLET ORAL at 05:10

## 2022-10-16 RX ADMIN — GABAPENTIN 300 MG: 300 CAPSULE ORAL at 09:10

## 2022-10-16 RX ADMIN — METRONIDAZOLE 500 MG: 500 TABLET ORAL at 09:10

## 2022-10-16 RX ADMIN — BISACODYL 10 MG: 10 SUPPOSITORY RECTAL at 12:10

## 2022-10-16 RX ADMIN — HYDROCODONE BITARTRATE AND ACETAMINOPHEN 1 TABLET: 5; 325 TABLET ORAL at 06:10

## 2022-10-16 NOTE — PROGRESS NOTES
Urology on call    S/p Cystectom and is doing well.  Ambulating on regular diet and passing gas    Chest clear  Abd soft with good bs   Stoma pink  Pink serous fluid from FELECIA  Mold penoscrotal edema- has prosthesis    I/P  asking for suppository- will give  Probable d/c tomorrow

## 2022-10-17 VITALS
BODY MASS INDEX: 25.68 KG/M2 | DIASTOLIC BLOOD PRESSURE: 71 MMHG | RESPIRATION RATE: 20 BRPM | HEART RATE: 65 BPM | OXYGEN SATURATION: 97 % | SYSTOLIC BLOOD PRESSURE: 134 MMHG | WEIGHT: 183.44 LBS | HEIGHT: 71 IN | TEMPERATURE: 98 F

## 2022-10-17 PROCEDURE — 25000003 PHARM REV CODE 250: Performed by: UROLOGY

## 2022-10-17 RX ADMIN — ALUMINUM HYDROXIDE, MAGNESIUM HYDROXIDE, AND DIMETHICONE 30 ML: 200; 20; 200 SUSPENSION ORAL at 11:10

## 2022-10-17 RX ADMIN — PANTOPRAZOLE SODIUM 40 MG: 40 TABLET, DELAYED RELEASE ORAL at 09:10

## 2022-10-17 RX ADMIN — HYDROCODONE BITARTRATE AND ACETAMINOPHEN 1 TABLET: 5; 325 TABLET ORAL at 01:10

## 2022-10-17 RX ADMIN — ALUMINUM HYDROXIDE, MAGNESIUM HYDROXIDE, AND DIMETHICONE 30 ML: 200; 20; 200 SUSPENSION ORAL at 05:10

## 2022-10-17 RX ADMIN — DOCUSATE SODIUM 100 MG: 100 CAPSULE, LIQUID FILLED ORAL at 09:10

## 2022-10-17 RX ADMIN — SUCRALFATE 1 G: 1 TABLET ORAL at 05:10

## 2022-10-17 RX ADMIN — GABAPENTIN 300 MG: 300 CAPSULE ORAL at 09:10

## 2022-10-17 RX ADMIN — SUCRALFATE 1 G: 1 TABLET ORAL at 11:10

## 2022-10-17 RX ADMIN — METRONIDAZOLE 500 MG: 500 TABLET ORAL at 05:10

## 2022-10-17 NOTE — PROGRESS NOTES
Education and demonstration performed barrier and bag change patient and spouse voiced understanding. Patient being discharged home with home health.  Supplies provided until home health can see patient. All questions answered.      10/17/22 1631        Urostomy 10/11/22 ureterostomy, right RLQ   Date of First Assessment: 10/11/22   Present Prior to Hospital Arrival?: No  Inserted by: MD  Urostomy Type: ureterostomy, right  Location: RLQ   Wound Image    Stoma Appearance round;red   Stomal Appliance 2 piece   Accessories/Skin Care cleansed w/ soap and water;skin barrier ring   Stoma Function yellow urine   Peristomal Skin dry   Tolerance no signs/symptoms of discomfort

## 2022-10-17 NOTE — PROGRESS NOTES
.UROLOGY  PROGRESS  NOTE    Mikel Jewell 1940  [id number]  10/17/2022    Mr. Savage is s/p cytstectomy POD 6. Doing really well. Complaining of some lower abdominal pain but reports it is well controlled with medication. Ambulating with walker, passing gas. VSS; afebrile.    Intake/Output:  No intake/output data recorded.  I/O last 3 completed shifts:  In: 1960 [P.O.:1960]  Out: 2805 [Urine:2665; Drains:140]       Exam:    NAD  Card RRR  Resp unlabored  Abd soft, NTND, SI dry & intact, serosang fluid draining to FELECIA   stoma pink with mucous, yellow urine draining to  bag  Extremity no C/C/E      No results found for this or any previous visit (from the past 24 hour(s)).      Assessment:  Muscle invasive bladder cancer status post robotic cystectomy, prostatectomy and urinary diversion in the form of an open ileal conduit      Plan:  D/c drain  D/c home   Home health set up for ostomy     GARRISON Carmona

## 2022-10-17 NOTE — DISCHARGE SUMMARY
[facility] - 8th Floor Med Surg  Urology  Discharge Summary      Patient Name: Mikel Jewell  MRN: [id number]  Admission Date: 10/11/2022  Hospital Length of Stay: 6 days  Discharge Date and Time:  10/17/2022 1:37 PM  Attending Physician: Flip Casas MD   Discharging Provider: GARRISON Carmona  Primary Care Physician: David Kim Jr, MD      Procedure(s) (LRB):  ROBOTIC CYSTECTOMY (N/A)  XI ROBOTIC PROSTATECTOMY  CREATION, ILEAL CONDUIT     Indwelling Lines/Drains at time of discharge:   Lines/Drains/Airways       Drain  Duration                  Urostomy 10/11/22 ureterostomy, right RLQ 6 days         Closed/Suction Drain 10/11/22 1607 Left Hip Bulb 19 Fr. 5 days                    Hospital Course (synopsis of major diagnoses, care, treatment, and services provided during the course of the hospital stay): Mr. Jewell is an 83 yo male admitted for observation following an elective robotic cystectomy, prostatectomy and creation of urinary diversion in the form of an open ileal conduit for muscle invasive bladder cancer.  Procedure went as planned without any complications,  mL.  Had an uncomplicated postoperative course.  Has remained hemodynamically stable.  He was advanced to a regular diet as tolerated without any complications.  He is ambulating, reports passing gas.  His pain is well controlled with current pain regimen.  Discharge instructions discussed; no heavy lifting, bending, pushing, pulling or straining.  Home health was set up for ostomy care.  Ostomy nurse will exchange appliance and flange prior to discharge.  Patient is already filled his postoperative medications and denies any questions.  Will have him follow-up in 1-2 weeks.  Patient instructed to call for any questions or concerns.      Pending Diagnostic Studies:       Procedure Component Value Units Date/Time    Specimen to Pathology [[id number]] Collected: 10/11/22 1444    Order Status: Sent Lab  Status: In process Updated: 10/12/22 0853    Specimen: Tissue from Ureter, Right; Tissue from Ureter, Right; Tissue from Bladder             Final Active Diagnoses:      Problems Resolved During this Admission:    Diagnosis Date Noted Date Resolved POA    PRINCIPAL PROBLEM:  Malignant neoplasm of trigone of urinary bladder [C67.0] 10/11/2022 10/11/2022 Yes       Discharged Condition: good    Disposition: Home or Self Care    Follow Up:   Follow-up Information       Flip Casas MD Follow up.    Specialty: Urology  Why: 1-2 weeks  Contact information:  [facility]  [address]  [facility] [address]  [phone]                           Patient Instructions:      HOME HEALTH ORDERS   Order Comments: Home Health to eval and treat all services, including new urostomy care     Order Specific Question Answer Comments   What Home Health Agency is the patient currently using? Other/External      Lifting restrictions   Order Comments: No heavy lifting, bending, pushing, pulling or straining     Notify your health care provider if you experience any of the following:  temperature >100.4     Notify your health care provider if you experience any of the following:  persistent nausea and vomiting or diarrhea     Notify your health care provider if you experience any of the following:  severe uncontrolled pain     Notify your health care provider if you experience any of the following:  redness, tenderness, or signs of infection (pain, swelling, redness, odor or green/yellow discharge around incision site)     Notify your health care provider if you experience any of the following:  persistent dizziness, light-headedness, or visual disturbances     Notify your health care provider if you experience any of the following:  increased confusion or weakness     Medications:  Reconciled Home Medications:      Medication List        CONTINUE taking these medications      acetaminophen 500 MG tablet  Commonly known as:  TYLENOL  Take 1,000 mg by mouth every 6 (six) hours as needed for Pain.     cefdinir 300 MG capsule  Commonly known as: OMNICEF  Take 300 mg by mouth 2 (two) times daily.     HYDROcodone-acetaminophen 5-325 mg per tablet  Commonly known as: NORCO  Take 1 tablet by mouth every 4 (four) hours as needed. for pain.     hyoscyamine 0.125 mg Subl  Commonly known as: LEVSIN/SL  Place 1 tablet under the tongue every 6 (six) hours as needed.     ibuprofen 200 MG tablet  Commonly known as: ADVIL,MOTRIN  Take 400 mg by mouth every 6 (six) hours as needed for Pain.     losartan-hydrochlorothiazide 50-12.5 mg 50-12.5 mg per tablet  Commonly known as: HYZAAR  Take 1 tablet by mouth once daily.     MYRBETRIQ 50 mg Tb24  Generic drug: mirabegron  Take 1 tablet by mouth Daily.     NON FORMULARY MEDICATION  Take 2 tablets by mouth Daily. VitaFusion     omeprazole 20 MG capsule  Commonly known as: PRILOSEC  Take 20 mg by mouth once daily.              GARRISON Carmona  Urology  [facility] - 8th Floor Med Surg

## 2022-10-19 ENCOUNTER — PATIENT OUTREACH (OUTPATIENT)
Dept: ADMINISTRATIVE | Facility: CLINIC | Age: 82
End: 2022-10-19
Payer: OTHER GOVERNMENT

## 2022-10-20 NOTE — PROGRESS NOTES
C3 nurse spoke with Mikel Jewell   for a TCC post hospital discharge follow up call. The patient has a scheduled HOSFU appointment with David Kim Jr, MD   on 12/2/2022 he needs to reschedule for sooner apt for tcc. .    .

## 2022-10-21 LAB
DHEA SERPL-MCNC: NORMAL
ESTROGEN SERPL-MCNC: NORMAL PG/ML
INSULIN SERPL-ACNC: NORMAL U[IU]/ML
LAB AP CLINICAL INFORMATION: NORMAL
LAB AP GROSS DESCRIPTION: NORMAL
LAB AP INTRA OP: NORMAL
LAB AP REPORT FOOTNOTES: NORMAL
T3RU NFR SERPL: NORMAL %

## 2023-02-24 ENCOUNTER — LAB VISIT (OUTPATIENT)
Dept: LAB | Facility: HOSPITAL | Age: 83
End: 2023-02-24
Attending: NURSE PRACTITIONER
Payer: OTHER GOVERNMENT

## 2023-02-24 DIAGNOSIS — E87.5 HYPERPOTASSEMIA: Primary | ICD-10-CM

## 2023-02-24 LAB — POTASSIUM SERPL-SCNC: 4.2 MMOL/L (ref 3.5–5.1)

## 2023-02-24 PROCEDURE — 36415 COLL VENOUS BLD VENIPUNCTURE: CPT

## 2023-02-24 PROCEDURE — 84132 ASSAY OF SERUM POTASSIUM: CPT

## 2023-05-16 DIAGNOSIS — C67.0 MALIGNANT NEOPLASM OF TRIGONE OF URINARY BLADDER: Primary | ICD-10-CM

## 2023-06-05 ENCOUNTER — LAB VISIT (OUTPATIENT)
Dept: LAB | Facility: HOSPITAL | Age: 83
End: 2023-06-05
Attending: FAMILY MEDICINE
Payer: OTHER GOVERNMENT

## 2023-06-05 DIAGNOSIS — I10 ESSENTIAL HYPERTENSION, MALIGNANT: ICD-10-CM

## 2023-06-05 DIAGNOSIS — I25.10 CORONARY ATHEROSCLEROSIS OF NATIVE CORONARY ARTERY: Primary | ICD-10-CM

## 2023-06-05 DIAGNOSIS — N18.9 CHRONIC KIDNEY DISEASE, UNSPECIFIED: ICD-10-CM

## 2023-06-05 LAB
ALBUMIN SERPL-MCNC: 3.9 G/DL (ref 3.4–4.8)
ALP SERPL-CCNC: 92 UNIT/L (ref 40–150)
ALT SERPL-CCNC: 23 UNIT/L (ref 0–55)
ANION GAP SERPL CALC-SCNC: 4 MEQ/L
AST SERPL-CCNC: 23 UNIT/L (ref 5–34)
BASOPHILS # BLD AUTO: 0.01 X10(3)/MCL
BASOPHILS NFR BLD AUTO: 0.2 %
BILIRUBIN DIRECT+TOT PNL SERPL-MCNC: 0.3 MG/DL (ref 0–?)
BILIRUBIN DIRECT+TOT PNL SERPL-MCNC: 0.4 MG/DL (ref 0–0.8)
BILIRUBIN DIRECT+TOT PNL SERPL-MCNC: 0.7 MG/DL
BUN SERPL-MCNC: 19.8 MG/DL (ref 8.4–25.7)
CALCIUM SERPL-MCNC: 8.9 MG/DL (ref 8.8–10)
CHLORIDE SERPL-SCNC: 104 MMOL/L (ref 98–107)
CO2 SERPL-SCNC: 30 MMOL/L (ref 23–31)
CREAT SERPL-MCNC: 1.28 MG/DL (ref 0.73–1.18)
CREAT/UREA NIT SERPL: 15
EOSINOPHIL # BLD AUTO: 0.18 X10(3)/MCL (ref 0–0.9)
EOSINOPHIL NFR BLD AUTO: 3.6 %
ERYTHROCYTE [DISTWIDTH] IN BLOOD BY AUTOMATED COUNT: 12.9 % (ref 11.5–17)
GFR SERPLBLD CREATININE-BSD FMLA CKD-EPI: 56 MLS/MIN/1.73/M2
GLUCOSE SERPL-MCNC: 104 MG/DL (ref 82–115)
HCT VFR BLD AUTO: 36.9 % (ref 42–52)
HGB BLD-MCNC: 12.1 G/DL (ref 14–18)
IMM GRANULOCYTES # BLD AUTO: 0 X10(3)/MCL (ref 0–0.04)
IMM GRANULOCYTES NFR BLD AUTO: 0 %
LYMPHOCYTES # BLD AUTO: 1.03 X10(3)/MCL (ref 0.6–4.6)
LYMPHOCYTES NFR BLD AUTO: 20.4 %
MCH RBC QN AUTO: 30.6 PG (ref 27–31)
MCHC RBC AUTO-ENTMCNC: 32.8 G/DL (ref 33–36)
MCV RBC AUTO: 93.4 FL (ref 80–94)
MONOCYTES # BLD AUTO: 0.38 X10(3)/MCL (ref 0.1–1.3)
MONOCYTES NFR BLD AUTO: 7.5 %
NEUTROPHILS # BLD AUTO: 3.45 X10(3)/MCL (ref 2.1–9.2)
NEUTROPHILS NFR BLD AUTO: 68.3 %
PLATELET # BLD AUTO: 131 X10(3)/MCL (ref 130–400)
PMV BLD AUTO: 11 FL (ref 7.4–10.4)
POTASSIUM SERPL-SCNC: 5.5 MMOL/L (ref 3.5–5.1)
PROT SERPL-MCNC: 6.4 GM/DL (ref 5.8–7.6)
RBC # BLD AUTO: 3.95 X10(6)/MCL (ref 4.7–6.1)
SODIUM SERPL-SCNC: 138 MMOL/L (ref 136–145)
WBC # SPEC AUTO: 5.05 X10(3)/MCL (ref 4.5–11.5)

## 2023-06-05 PROCEDURE — 80048 BASIC METABOLIC PNL TOTAL CA: CPT

## 2023-06-05 PROCEDURE — 36415 COLL VENOUS BLD VENIPUNCTURE: CPT

## 2023-06-05 PROCEDURE — 80076 HEPATIC FUNCTION PANEL: CPT

## 2023-06-05 PROCEDURE — 85025 COMPLETE CBC W/AUTO DIFF WBC: CPT

## 2023-07-17 ENCOUNTER — HOSPITAL ENCOUNTER (OUTPATIENT)
Dept: RADIOLOGY | Facility: HOSPITAL | Age: 83
Discharge: HOME | End: 2023-07-17
Attending: UROLOGY
Payer: MEDICARE

## 2023-07-17 DIAGNOSIS — C67.0 MALIGNANT NEOPLASM OF TRIGONE OF URINARY BLADDER: ICD-10-CM

## 2023-07-17 PROCEDURE — 25500020 PHARM REV CODE 255: Performed by: UROLOGY

## 2023-07-17 PROCEDURE — 74178 CT ABD&PLV WO CNTR FLWD CNTR: CPT | Mod: TC

## 2023-07-17 RX ADMIN — IOPAMIDOL 100 ML: 755 INJECTION, SOLUTION INTRAVENOUS at 08:07

## 2023-07-26 ENCOUNTER — OFFICE VISIT (OUTPATIENT)
Dept: ORTHOPEDICS | Facility: CLINIC | Age: 83
End: 2023-07-26
Payer: MEDICARE

## 2023-07-26 DIAGNOSIS — M25.551 ACUTE HIP PAIN, RIGHT: Primary | ICD-10-CM

## 2023-07-26 DIAGNOSIS — M84.359A STRESS FRACTURE OF HIP, INITIAL ENCOUNTER: ICD-10-CM

## 2023-07-26 PROCEDURE — 1159F PR MEDICATION LIST DOCUMENTED IN MEDICAL RECORD: ICD-10-PCS | Mod: CPTII,,, | Performed by: ORTHOPAEDIC SURGERY

## 2023-07-26 PROCEDURE — 1100F PTFALLS ASSESS-DOCD GE2>/YR: CPT | Mod: CPTII,,, | Performed by: ORTHOPAEDIC SURGERY

## 2023-07-26 PROCEDURE — 1160F RVW MEDS BY RX/DR IN RCRD: CPT | Mod: CPTII,,, | Performed by: ORTHOPAEDIC SURGERY

## 2023-07-26 PROCEDURE — 1125F AMNT PAIN NOTED PAIN PRSNT: CPT | Mod: CPTII,,, | Performed by: ORTHOPAEDIC SURGERY

## 2023-07-26 PROCEDURE — 3288F FALL RISK ASSESSMENT DOCD: CPT | Mod: CPTII,,, | Performed by: ORTHOPAEDIC SURGERY

## 2023-07-26 PROCEDURE — 1160F PR REVIEW ALL MEDS BY PRESCRIBER/CLIN PHARMACIST DOCUMENTED: ICD-10-PCS | Mod: CPTII,,, | Performed by: ORTHOPAEDIC SURGERY

## 2023-07-26 PROCEDURE — 1100F PR PT FALLS ASSESS DOC 2+ FALLS/FALL W/INJURY/YR: ICD-10-PCS | Mod: CPTII,,, | Performed by: ORTHOPAEDIC SURGERY

## 2023-07-26 PROCEDURE — 1159F MED LIST DOCD IN RCRD: CPT | Mod: CPTII,,, | Performed by: ORTHOPAEDIC SURGERY

## 2023-07-26 PROCEDURE — 3288F PR FALLS RISK ASSESSMENT DOCUMENTED: ICD-10-PCS | Mod: CPTII,,, | Performed by: ORTHOPAEDIC SURGERY

## 2023-07-26 PROCEDURE — 99204 OFFICE O/P NEW MOD 45 MIN: CPT | Mod: ,,, | Performed by: ORTHOPAEDIC SURGERY

## 2023-07-26 PROCEDURE — 99204 PR OFFICE/OUTPT VISIT, NEW, LEVL IV, 45-59 MIN: ICD-10-PCS | Mod: ,,, | Performed by: ORTHOPAEDIC SURGERY

## 2023-07-26 PROCEDURE — 1125F PR PAIN SEVERITY QUANTIFIED, PAIN PRESENT: ICD-10-PCS | Mod: CPTII,,, | Performed by: ORTHOPAEDIC SURGERY

## 2023-07-26 NOTE — PROGRESS NOTES
Subjective:    CC: Pain of the Right Hip and Hip Pain (Rt hip pain no prior sx, pain started last week pt fell after that hip started hurting .not taking pain meds.)       HPI:  Patient comes in today for his 1st appointment.  Patient complains of right hip pain after a fall.  Patient started about 2 weeks ago, since then he is not been able to do his daily walks, he is not improving.  To have pain on the outside part of his right hip.    ROS: Refer to HPI for pertinent ROS. All other 12 point systems negative.    Objective:  There were no vitals filed for this visit.     Physical Exam:  Patient is well-nourished developed male he is awake alert and oriented x3 skin apparent stress is pleasant and cooperative.  Examination of the right lower extremity compartment soft and warm.  Skin is intact.  There is no signs symptoms of DVT or infection.  He does have some discomfort with log roll of the right hip positive Stinchfield exam.  He is tender along the lateral aspect of the trochanteric region as well as along the iliac wing.  There is no obvious long track signs he walks a slight hesitant gait, neurovascular intact distally.    Images:  X-rays two views right hip demonstrate no obvious fracture or dislocation.. Images Reviewed and discussed with patient.    Assessment:  1. Acute hip pain, right  - X-Ray Hip 2 or 3 views Right (with Pelvis when performed); Future    2. Stress fracture of hip, initial encounter  - CT Hip Without Contrast Right; Future        Plan:  At this time we discussed his physical exam and x-ray findings.  He is not interested in a walker or assistive device.  We will proceed with a CT scan to rule out a stress fracture.  I will call him with his results otherwise I would like to see him back later this week for his results.    Follow UP: No follow-ups on file.

## 2023-07-28 ENCOUNTER — HOSPITAL ENCOUNTER (OUTPATIENT)
Dept: RADIOLOGY | Facility: HOSPITAL | Age: 83
Discharge: HOME | End: 2023-07-28
Attending: ORTHOPAEDIC SURGERY
Payer: MEDICARE

## 2023-07-28 DIAGNOSIS — M84.359A STRESS FRACTURE OF HIP, INITIAL ENCOUNTER: ICD-10-CM

## 2023-07-28 PROCEDURE — 73700 CT LOWER EXTREMITY W/O DYE: CPT | Mod: TC,RT

## 2023-08-02 ENCOUNTER — OFFICE VISIT (OUTPATIENT)
Dept: ORTHOPEDICS | Facility: CLINIC | Age: 83
End: 2023-08-02
Payer: MEDICARE

## 2023-08-02 VITALS
DIASTOLIC BLOOD PRESSURE: 77 MMHG | BODY MASS INDEX: 25.62 KG/M2 | SYSTOLIC BLOOD PRESSURE: 154 MMHG | HEIGHT: 71 IN | WEIGHT: 183 LBS | TEMPERATURE: 98 F | HEART RATE: 54 BPM

## 2023-08-02 DIAGNOSIS — M70.61 GREATER TROCHANTERIC BURSITIS OF RIGHT HIP: ICD-10-CM

## 2023-08-02 DIAGNOSIS — M16.11 OSTEOARTHRITIS OF RIGHT HIP, UNSPECIFIED OSTEOARTHRITIS TYPE: Primary | ICD-10-CM

## 2023-08-02 PROCEDURE — 20610 LARGE JOINT ASPIRATION/INJECTION: R GREATER TROCHANTERIC BURSA: ICD-10-PCS | Mod: RT,,,

## 2023-08-02 PROCEDURE — 20610 DRAIN/INJ JOINT/BURSA W/O US: CPT | Mod: RT,,,

## 2023-08-02 PROCEDURE — 99214 PR OFFICE/OUTPT VISIT, EST, LEVL IV, 30-39 MIN: ICD-10-PCS | Mod: 25,,,

## 2023-08-02 PROCEDURE — 99214 OFFICE O/P EST MOD 30 MIN: CPT | Mod: 25,,,

## 2023-08-02 RX ORDER — BETAMETHASONE SODIUM PHOSPHATE AND BETAMETHASONE ACETATE 3; 3 MG/ML; MG/ML
12 INJECTION, SUSPENSION INTRA-ARTICULAR; INTRALESIONAL; INTRAMUSCULAR; SOFT TISSUE
Status: DISCONTINUED | OUTPATIENT
Start: 2023-08-02 | End: 2023-08-02 | Stop reason: HOSPADM

## 2023-08-02 RX ORDER — MELOXICAM 7.5 MG/1
7.5 TABLET ORAL DAILY
Qty: 30 TABLET | Refills: 0 | Status: SHIPPED | OUTPATIENT
Start: 2023-08-02 | End: 2023-10-03 | Stop reason: ALTCHOICE

## 2023-08-02 RX ORDER — LIDOCAINE HYDROCHLORIDE 20 MG/ML
3 INJECTION, SOLUTION INFILTRATION; PERINEURAL
Status: DISCONTINUED | OUTPATIENT
Start: 2023-08-02 | End: 2023-08-02 | Stop reason: HOSPADM

## 2023-08-02 RX ADMIN — LIDOCAINE HYDROCHLORIDE 3 MG: 20 INJECTION, SOLUTION INFILTRATION; PERINEURAL at 09:08

## 2023-08-02 RX ADMIN — BETAMETHASONE SODIUM PHOSPHATE AND BETAMETHASONE ACETATE 12 MG: 3; 3 INJECTION, SUSPENSION INTRA-ARTICULAR; INTRALESIONAL; INTRAMUSCULAR; SOFT TISSUE at 09:08

## 2023-08-02 NOTE — PROCEDURES
Large Joint Aspiration/Injection: R greater trochanteric bursa    Date/Time: 8/2/2023 9:30 AM    Performed by: Yasmin Skaggs PA-C  Authorized by: Yasmin Skaggs PA-C    Consent Done?:  Yes (Verbal)  Indications:  Pain  Site marked: the procedure site was marked    Timeout: prior to procedure the correct patient, procedure, and site was verified    Prep: patient was prepped and draped in usual sterile fashion    Local anesthesia used?: No      Details:  Needle Size:  22 G  Approach:  Lateral  Location:  Hip  Site:  R greater trochanteric bursa  Medications:  12 mg betamethasone acetate-betamethasone sodium phosphate 6 mg/mL; 3 mg LIDOcaine HCL 20 mg/ml (2%) 20 mg/mL (2 %)  Patient tolerance:  Patient tolerated the procedure well with no immediate complications

## 2023-08-02 NOTE — PROGRESS NOTES
Subjective:    CC: Results of the Right Hip (Here for Ct results of the hip. Needs something for the pain for some relief. Takes OTC which is helping.  )       HPI:  Patient presents to clinic for review of right hip CT results.  Imaging shows underlying mild joint degeneration otherwise no acute fracture.  Patient is approximately 3 weeks out from his slip and fall.  He states yesterday he returned to walking was able to walk 3 miles.  He does still have pain about the lateral aspect of the hip.  Denies groin pain.  Denies any numbness or tingling down the lower extremity.  Currently taking Tylenol as needed with some mild relief.  He denies any new complaints.    ROS: Refer to HPI for pertinent ROS. All other 12 point systems negative.    Objective:    Vitals:    08/02/23 0936   BP: (!) 154/77   Pulse: (!) 54   Temp: 97.7 °F (36.5 °C)        Physical Exam:  Examination of the right lower extremity compartments are soft and warm.  Skin is intact.  There are no signs symptoms of DVT or infection.  He does have some mild discomfort with log roll of the right hip; negative Stinchfield exam.  He is tender along the lateral aspect of the trochanteric region.  There is no obvious long track signs he walks a slight hesitant gait, neurovascular intact distally.    Images:  previous xray and CT Images Reviewed and discussed with patient.    Assessment:  1. Osteoarthritis of right hip, unspecified osteoarthritis type    2. Greater trochanteric bursitis of right hip  - Large Joint Aspiration/Injection: R greater trochanteric bursa  - LIDOcaine HCL 20 mg/ml (2%) injection 3 mg  - betamethasone acetate-betamethasone sodium phosphate injection 12 mg       Plan:  Physical exam and imaging findings discussed with patient.  Patient is most symptomatic about his greater trochanteric bursitis.  Tolerated a right trochanteric bursitis steroid injection well today in clinic.  Patient was given hip exercises and prescription for Mobic  with appropriate precautions.  I would like to see the patient back in 6 weeks to assess his progress.    Follow up: Follow up in about 6 weeks (around 9/13/2023).    Large Joint Aspiration/Injection: R greater trochanteric bursa    Date/Time: 8/2/2023 9:30 AM    Performed by: Yasmin Skaggs PA-C  Authorized by: Yasmin Skaggs PA-C    Consent Done?:  Yes (Verbal)  Indications:  Pain  Site marked: the procedure site was marked    Timeout: prior to procedure the correct patient, procedure, and site was verified    Prep: patient was prepped and draped in usual sterile fashion    Local anesthesia used?: No      Details:  Needle Size:  22 G  Approach:  Lateral  Location:  Hip  Site:  R greater trochanteric bursa  Medications:  12 mg betamethasone acetate-betamethasone sodium phosphate 6 mg/mL; 3 mg LIDOcaine HCL 20 mg/ml (2%) 20 mg/mL (2 %)  Patient tolerance:  Patient tolerated the procedure well with no immediate complications

## 2023-08-16 ENCOUNTER — OFFICE VISIT (OUTPATIENT)
Dept: ORTHOPEDICS | Facility: CLINIC | Age: 83
End: 2023-08-16
Payer: MEDICARE

## 2023-08-16 VITALS — HEIGHT: 71 IN | BODY MASS INDEX: 25.62 KG/M2 | WEIGHT: 183 LBS

## 2023-08-16 DIAGNOSIS — M16.11 OSTEOARTHRITIS OF RIGHT HIP, UNSPECIFIED OSTEOARTHRITIS TYPE: Primary | ICD-10-CM

## 2023-08-16 DIAGNOSIS — M70.61 GREATER TROCHANTERIC BURSITIS OF RIGHT HIP: ICD-10-CM

## 2023-08-16 PROCEDURE — 99214 OFFICE O/P EST MOD 30 MIN: CPT | Mod: ,,,

## 2023-08-16 PROCEDURE — 1101F PT FALLS ASSESS-DOCD LE1/YR: CPT | Mod: CPTII,,,

## 2023-08-16 PROCEDURE — 1159F MED LIST DOCD IN RCRD: CPT | Mod: CPTII,,,

## 2023-08-16 PROCEDURE — 3288F PR FALLS RISK ASSESSMENT DOCUMENTED: ICD-10-PCS | Mod: CPTII,,,

## 2023-08-16 PROCEDURE — 1101F PR PT FALLS ASSESS DOC 0-1 FALLS W/OUT INJ PAST YR: ICD-10-PCS | Mod: CPTII,,,

## 2023-08-16 PROCEDURE — 99214 PR OFFICE/OUTPT VISIT, EST, LEVL IV, 30-39 MIN: ICD-10-PCS | Mod: ,,,

## 2023-08-16 PROCEDURE — 1160F RVW MEDS BY RX/DR IN RCRD: CPT | Mod: CPTII,,,

## 2023-08-16 PROCEDURE — 3288F FALL RISK ASSESSMENT DOCD: CPT | Mod: CPTII,,,

## 2023-08-16 PROCEDURE — 1160F PR REVIEW ALL MEDS BY PRESCRIBER/CLIN PHARMACIST DOCUMENTED: ICD-10-PCS | Mod: CPTII,,,

## 2023-08-16 PROCEDURE — 1159F PR MEDICATION LIST DOCUMENTED IN MEDICAL RECORD: ICD-10-PCS | Mod: CPTII,,,

## 2023-08-16 RX ORDER — METHYLPREDNISOLONE 4 MG/1
TABLET ORAL
Qty: 1 EACH | Refills: 0 | Status: SHIPPED | OUTPATIENT
Start: 2023-08-16 | End: 2023-10-03 | Stop reason: ALTCHOICE

## 2023-08-16 RX ORDER — ATORVASTATIN CALCIUM 40 MG/1
40 TABLET, FILM COATED ORAL DAILY
COMMUNITY
Start: 2022-11-22

## 2023-08-16 NOTE — PROGRESS NOTES
Subjective:    CC: Pain of the Right Hip and Follow-up (R hip inj 8/2/23 - pt states that his hip has gotten worse and he is unable to walk without pain. he is using a cane to ambulate, but uses wheelchair in office today )       HPI:  Patient presents to clinic for repeat evaluation of his right hip.  Status post trochanteric bursa injection on 08/02/2023.  Patient states that he did not receive any relief with this.  He has been doing at home hip exercises.  He did briefly taking Mobic but states that did not help.  He feels that he is not progressing is not able to walk as far as he was.  Using a cane to ambulate.  Admits to slight anterior groin pain but most symptomatic about the lateral hip aspect.  He denies any new inciting events or trauma.    ROS: Refer to HPI for pertinent ROS. All other 12 point systems negative.    Objective:    There were no vitals filed for this visit.     Physical Exam: Examination of the right lower extremity compartments are soft and warm.  Skin is intact.  There are no signs symptoms of DVT or infection.  He does have some mild discomfort with log roll of the right hip; mildly positive Stinchfield exam.  He is tender along the lateral aspect of the trochanteric region.  There is no obvious long track signs he walks a slight hesitant gait, neurovascular intact distally.     Images:  previous xray and CT Images Reviewed and discussed with patient.      Assessment:  1. Osteoarthritis of right hip, unspecified osteoarthritis type  - methylPREDNISolone (MEDROL DOSEPACK) 4 mg tablet; use as directed  Dispense: 1 each; Refill: 0  - Ambulatory referral/consult to Physical/Occupational Therapy; Future    2. Greater trochanteric bursitis of right hip  - methylPREDNISolone (MEDROL DOSEPACK) 4 mg tablet; use as directed  Dispense: 1 each; Refill: 0  - Ambulatory referral/consult to Physical/Occupational Therapy; Future       Plan:  Physical exam and previous imaging findings discussed with  patient.  Patient continues to asymptomatic about his trochanteric bursal region.  He was given a Medrol Dosepak with appropriate precautions.  Patient understands not to take other anti-inflammatories until he has completed this course.  We will also start formal physical therapy to work on range of motion strengthening.  I would like to see the patient back at his scheduled appointment.  Patient understands to call sooner with any problems or difficulties.    Follow up: No follow-ups on file.

## 2023-08-28 DIAGNOSIS — M54.50 LOWER BACK PAIN: Primary | ICD-10-CM

## 2023-08-30 ENCOUNTER — HOSPITAL ENCOUNTER (OUTPATIENT)
Dept: RADIOLOGY | Facility: HOSPITAL | Age: 83
Discharge: HOME | End: 2023-08-30
Attending: FAMILY MEDICINE
Payer: OTHER GOVERNMENT

## 2023-08-30 DIAGNOSIS — M54.50 LOWER BACK PAIN: ICD-10-CM

## 2023-08-30 PROCEDURE — 72148 MRI LUMBAR SPINE W/O DYE: CPT | Mod: TC

## 2023-10-02 DIAGNOSIS — M81.0 AGE-RELATED OSTEOPOROSIS WITHOUT CURRENT PATHOLOGICAL FRACTURE: Primary | ICD-10-CM

## 2023-10-03 ENCOUNTER — HOSPITAL ENCOUNTER (OUTPATIENT)
Dept: RADIOLOGY | Facility: HOSPITAL | Age: 83
Discharge: HOME | End: 2023-10-03
Attending: FAMILY MEDICINE
Payer: OTHER GOVERNMENT

## 2023-10-03 ENCOUNTER — HOSPITAL ENCOUNTER (EMERGENCY)
Facility: HOSPITAL | Age: 83
Discharge: HOME | End: 2023-10-03
Attending: EMERGENCY MEDICINE
Payer: MEDICARE

## 2023-10-03 VITALS
HEIGHT: 71 IN | DIASTOLIC BLOOD PRESSURE: 88 MMHG | HEART RATE: 57 BPM | OXYGEN SATURATION: 98 % | TEMPERATURE: 98 F | BODY MASS INDEX: 26.6 KG/M2 | RESPIRATION RATE: 18 BRPM | WEIGHT: 190 LBS | SYSTOLIC BLOOD PRESSURE: 157 MMHG

## 2023-10-03 DIAGNOSIS — M81.0 AGE-RELATED OSTEOPOROSIS WITHOUT CURRENT PATHOLOGICAL FRACTURE: ICD-10-CM

## 2023-10-03 DIAGNOSIS — R07.81 PLEURITIC CHEST PAIN: Primary | ICD-10-CM

## 2023-10-03 DIAGNOSIS — R07.9 CHEST PAIN: ICD-10-CM

## 2023-10-03 LAB
ALBUMIN SERPL-MCNC: 3.7 G/DL (ref 3.4–4.8)
ALBUMIN/GLOB SERPL: 1.3 RATIO (ref 1.1–2)
ALP SERPL-CCNC: 102 UNIT/L (ref 40–150)
ALT SERPL-CCNC: 20 UNIT/L (ref 0–55)
AST SERPL-CCNC: 20 UNIT/L (ref 5–34)
BASOPHILS # BLD AUTO: 0.02 X10(3)/MCL
BASOPHILS NFR BLD AUTO: 0.5 %
BILIRUB SERPL-MCNC: 1 MG/DL
BUN SERPL-MCNC: 17 MG/DL (ref 8.4–25.7)
CALCIUM SERPL-MCNC: 8.6 MG/DL (ref 8.8–10)
CHLORIDE SERPL-SCNC: 103 MMOL/L (ref 98–107)
CO2 SERPL-SCNC: 26 MMOL/L (ref 23–31)
CREAT SERPL-MCNC: 1.21 MG/DL (ref 0.73–1.18)
EOSINOPHIL # BLD AUTO: 0.09 X10(3)/MCL (ref 0–0.9)
EOSINOPHIL NFR BLD AUTO: 2.3 %
ERYTHROCYTE [DISTWIDTH] IN BLOOD BY AUTOMATED COUNT: 13.1 % (ref 11.5–17)
GFR SERPLBLD CREATININE-BSD FMLA CKD-EPI: 60 MLS/MIN/1.73/M2
GLOBULIN SER-MCNC: 2.8 GM/DL (ref 2.4–3.5)
GLUCOSE SERPL-MCNC: 92 MG/DL (ref 82–115)
HCT VFR BLD AUTO: 33 % (ref 42–52)
HGB BLD-MCNC: 10.9 G/DL (ref 14–18)
IMM GRANULOCYTES # BLD AUTO: 0.01 X10(3)/MCL (ref 0–0.04)
IMM GRANULOCYTES NFR BLD AUTO: 0.3 %
LIPASE SERPL-CCNC: 20 U/L
LYMPHOCYTES # BLD AUTO: 0.97 X10(3)/MCL (ref 0.6–4.6)
LYMPHOCYTES NFR BLD AUTO: 24.3 %
MCH RBC QN AUTO: 31.1 PG (ref 27–31)
MCHC RBC AUTO-ENTMCNC: 33 G/DL (ref 33–36)
MCV RBC AUTO: 94.3 FL (ref 80–94)
MONOCYTES # BLD AUTO: 0.39 X10(3)/MCL (ref 0.1–1.3)
MONOCYTES NFR BLD AUTO: 9.8 %
NEUTROPHILS # BLD AUTO: 2.51 X10(3)/MCL (ref 2.1–9.2)
NEUTROPHILS NFR BLD AUTO: 62.8 %
PLATELET # BLD AUTO: 136 X10(3)/MCL (ref 130–400)
PMV BLD AUTO: 10.3 FL (ref 7.4–10.4)
POC CARDIAC TROPONIN I: 0.01 NG/ML (ref 0–0.08)
POTASSIUM SERPL-SCNC: 4.3 MMOL/L (ref 3.5–5.1)
PROT SERPL-MCNC: 6.5 GM/DL (ref 5.8–7.6)
RBC # BLD AUTO: 3.5 X10(6)/MCL (ref 4.7–6.1)
SAMPLE: NORMAL
SODIUM SERPL-SCNC: 137 MMOL/L (ref 136–145)
TROPONIN I SERPL-MCNC: <0.01 NG/ML (ref 0–0.04)
WBC # SPEC AUTO: 3.99 X10(3)/MCL (ref 4.5–11.5)

## 2023-10-03 PROCEDURE — 77080 DXA BONE DENSITY AXIAL: CPT | Mod: TC

## 2023-10-03 PROCEDURE — 93005 ELECTROCARDIOGRAM TRACING: CPT

## 2023-10-03 PROCEDURE — 96374 THER/PROPH/DIAG INJ IV PUSH: CPT

## 2023-10-03 PROCEDURE — 85025 COMPLETE CBC W/AUTO DIFF WBC: CPT | Performed by: EMERGENCY MEDICINE

## 2023-10-03 PROCEDURE — 63600175 PHARM REV CODE 636 W HCPCS: Performed by: EMERGENCY MEDICINE

## 2023-10-03 PROCEDURE — 83690 ASSAY OF LIPASE: CPT | Performed by: EMERGENCY MEDICINE

## 2023-10-03 PROCEDURE — 93010 ELECTROCARDIOGRAM REPORT: CPT | Mod: ,,, | Performed by: INTERNAL MEDICINE

## 2023-10-03 PROCEDURE — 80053 COMPREHEN METABOLIC PANEL: CPT | Performed by: EMERGENCY MEDICINE

## 2023-10-03 PROCEDURE — 96375 TX/PRO/DX INJ NEW DRUG ADDON: CPT

## 2023-10-03 PROCEDURE — 93010 EKG 12-LEAD: ICD-10-PCS | Mod: ,,, | Performed by: INTERNAL MEDICINE

## 2023-10-03 PROCEDURE — 99285 EMERGENCY DEPT VISIT HI MDM: CPT | Mod: 25

## 2023-10-03 PROCEDURE — 84484 ASSAY OF TROPONIN QUANT: CPT | Performed by: EMERGENCY MEDICINE

## 2023-10-03 RX ORDER — KETOROLAC TROMETHAMINE 30 MG/ML
15 INJECTION, SOLUTION INTRAMUSCULAR; INTRAVENOUS
Status: COMPLETED | OUTPATIENT
Start: 2023-10-03 | End: 2023-10-03

## 2023-10-03 RX ORDER — KETOROLAC TROMETHAMINE 10 MG/1
10 TABLET, FILM COATED ORAL EVERY 6 HOURS
Qty: 20 TABLET | Refills: 0 | Status: SHIPPED | OUTPATIENT
Start: 2023-10-03 | End: 2023-10-08

## 2023-10-03 RX ADMIN — KETOROLAC TROMETHAMINE 15 MG: 30 INJECTION, SOLUTION INTRAMUSCULAR; INTRAVENOUS at 06:10

## 2023-10-03 NOTE — ED PROVIDER NOTES
Encounter Date: 10/3/2023       History     Chief Complaint   Patient presents with    Chest Pain     Pt c/o left sided CP that started around 7am this morning, pt also c/o sob and n/v. Pt has hx of stent placement years ago, denies any previous heart attacks or strokes.      This 83-year-old man presents with complaints of left-sided chest pain with associated shortness of breath and nausea.  He states symptoms began about 7:00 a.m. this morning and have been present all day.  He also complains of weakness in both legs.  He also reports a recent nephrectomy and laparoscopic cystectomy secondary to cancer related to the [facility].  He reports a history of a 40% blockage in 1 of his coronary arteries that received a stent about 5 years ago.  He was a smoker from 1956-71. The pain is made worse with breathing.      Review of patient's allergies indicates:  No Known Allergies  Past Medical History:   Diagnosis Date    Bladder cancer     Coronary artery disease     GERD (gastroesophageal reflux disease)     Hypertension     Renal cancer     Testicular pain      Past Surgical History:   Procedure Laterality Date    CHOLECYSTECTOMY      COLONOSCOPY      CORONARY ANGIOPLASTY WITH STENT PLACEMENT      ESOPHAGOGASTRODUODENOSCOPY      HERNIA REPAIR Bilateral     NEPHRECTOMY      PENILE PROSTHESIS IMPLANT      PROSTATE SURGERY  10/2022    Removal with bladder removal    Removal of bladder tumors      ROBOT-ASSISTED LAPAROSCOPIC CYSTECTOMY N/A 10/11/2022    Procedure: ROBOTIC CYSTECTOMY;  Surgeon: Flip Casas MD;  Location: [facility];  Service: Urology;  Laterality: N/A;  ROBOT ASSISTED CYSTECTOMY WITH URINARY DIVERSION //   ANY INDICATED PROCEDURES    ROBOT-ASSISTED LAPAROSCOPIC PROSTATECTOMY USING DA RASHEED XI  10/11/2022    Procedure: XI ROBOTIC PROSTATECTOMY;  Surgeon: Flip Casas MD;  Location: [facility] OR;  Service: Urology;;    SMALL INTESTINE SURGERY  10/2022    Ileistomy    TURBT, WITH BLUE LIGHT  CYSTOSCOPY AND CYSVIEW      urostomy       Family History   Problem Relation Age of Onset    Depression Mother             Diabetes Mother             Cancer Brother              Social History     Tobacco Use    Smoking status: Former     Current packs/day: 1.50     Average packs/day: 1.5 packs/day for 15.0 years (22.5 ttl pk-yrs)     Types: Cigarettes    Smokeless tobacco: Never    Tobacco comments:     Dont remember dates. - appx dates 0647-9976   Substance Use Topics    Alcohol use: Never    Drug use: Never     Review of Systems   Constitutional:  Negative for fever.   HENT:  Negative for sore throat.    Respiratory:  Positive for shortness of breath.    Cardiovascular:  Positive for chest pain.   Gastrointestinal:  Positive for nausea.   Genitourinary:  Negative for dysuria.   Musculoskeletal:  Negative for back pain.   Skin:  Negative for rash.   Neurological:  Positive for weakness.   Hematological:  Does not bruise/bleed easily.       Physical Exam     Initial Vitals [10/03/23 1638]   BP Pulse Resp Temp SpO2   (!) 201/96 62 18 98.2 °F (36.8 °C) 99 %      MAP       --         Physical Exam    Nursing note and vitals reviewed.  Constitutional: He appears well-developed and well-nourished.   HENT:   Head: Normocephalic and atraumatic.   Mouth/Throat: Mucous membranes are normal.   Eyes: EOM are normal. Pupils are equal, round, and reactive to light.   Neck: Neck supple.   Normal range of motion.  Cardiovascular:  Normal rate, regular rhythm, normal heart sounds and intact distal pulses.           Pulmonary/Chest: Breath sounds normal.   Abdominal: Abdomen is soft. Bowel sounds are normal. There is abdominal tenderness (epigastric).   Musculoskeletal:         General: Normal range of motion.      Cervical back: Normal range of motion and neck supple.     Neurological: He is alert and oriented to person, place, and time. He has normal strength.   Skin: Skin is warm and dry. Capillary  refill takes less than 2 seconds.   Psychiatric: He has a normal mood and affect. His behavior is normal. Judgment and thought content normal.         ED Course   Procedures  Labs Reviewed   COMPREHENSIVE METABOLIC PANEL - Abnormal; Notable for the following components:       Result Value    Creatinine 1.21 (*)     Calcium Level Total 8.6 (*)     All other components within normal limits   CBC WITH DIFFERENTIAL - Abnormal; Notable for the following components:    WBC 3.99 (*)     RBC 3.50 (*)     Hgb 10.9 (*)     Hct 33.0 (*)     MCV 94.3 (*)     MCH 31.1 (*)     All other components within normal limits   LIPASE - Normal   CBC W/ AUTO DIFFERENTIAL    Narrative:     The following orders were created for panel order CBC auto differential.  Procedure                               Abnormality         Status                     ---------                               -----------         ------                     CBC with Differential[[id number]]       Abnormal            Final result                 Please view results for these tests on the individual orders.   TROPONIN ISTAT   TROPONIN I     EKG Readings: (Independently Interpreted)   Rhythm: Normal Sinus Rhythm. Heart Rate: 62. Ectopy: No Ectopy. Conduction: Normal. ST Segments: Normal ST Segments. T Waves: Normal. Axis: Normal. Clinical Impression: Normal Sinus Rhythm   10/3/23 16:35       Imaging Results              X-Ray Chest 1 View (In process)  Result time 10/03/23 18:08:30                     Medications   ketorolac injection 15 mg (has no administration in time range)     Medical Decision Making  Amount and/or Complexity of Data Reviewed  Labs: ordered.  Radiology: ordered.                               Clinical Impression:   Final diagnoses:  [R07.9] Chest pain  [R07.81] Pleuritic chest pain (Primary)        ED Disposition Condition    Discharge Stable          ED Prescriptions       Medication Sig Dispense Start Date End Date Auth. Provider    ketorolac  (TORADOL) 10 mg tablet Take 1 tablet (10 mg total) by mouth every 6 (six) hours. for 5 days 20 tablet 10/3/2023 10/8/2023 Sujit Rosenthal MD          Follow-up Information       Follow up With Specialties Details Why Contact Info    David Kim Jr., MD Family Medicine Schedule an appointment as soon as possible for a visit   [address] [place] [address] A  [facility] [address]  [phone]               Sujit Rosenthal MD  10/03/23 2755

## 2023-11-25 ENCOUNTER — HOSPITAL ENCOUNTER (EMERGENCY)
Facility: HOSPITAL | Age: 83
Discharge: HOME | End: 2023-11-25
Attending: FAMILY MEDICINE
Payer: OTHER GOVERNMENT

## 2023-11-25 VITALS
WEIGHT: 185 LBS | BODY MASS INDEX: 25.9 KG/M2 | RESPIRATION RATE: 18 BRPM | SYSTOLIC BLOOD PRESSURE: 143 MMHG | HEART RATE: 61 BPM | HEIGHT: 71 IN | DIASTOLIC BLOOD PRESSURE: 77 MMHG | TEMPERATURE: 98 F | OXYGEN SATURATION: 97 %

## 2023-11-25 DIAGNOSIS — R07.9 CHEST PAIN: ICD-10-CM

## 2023-11-25 DIAGNOSIS — R07.9 CHEST PAIN, UNSPECIFIED TYPE: Primary | ICD-10-CM

## 2023-11-25 DIAGNOSIS — R07.89 ATYPICAL CHEST PAIN: ICD-10-CM

## 2023-11-25 LAB
ALBUMIN SERPL-MCNC: 4 G/DL (ref 3.4–4.8)
ALBUMIN/GLOB SERPL: 1.4 RATIO (ref 1.1–2)
ALP SERPL-CCNC: 89 UNIT/L (ref 40–150)
ALT SERPL-CCNC: 21 UNIT/L (ref 0–55)
APPEARANCE UR: CLEAR
AST SERPL-CCNC: 26 UNIT/L (ref 5–34)
BACTERIA #/AREA URNS AUTO: ABNORMAL /HPF
BASOPHILS # BLD AUTO: 0.02 X10(3)/MCL
BASOPHILS NFR BLD AUTO: 0.4 %
BILIRUB SERPL-MCNC: 1.7 MG/DL
BILIRUB UR QL STRIP.AUTO: NEGATIVE
BNP BLD-MCNC: 38.7 PG/ML
BUN SERPL-MCNC: 24.5 MG/DL (ref 8.4–25.7)
CALCIUM SERPL-MCNC: 8.6 MG/DL (ref 8.8–10)
CHLORIDE SERPL-SCNC: 98 MMOL/L (ref 98–107)
CO2 SERPL-SCNC: 24 MMOL/L (ref 23–31)
COLOR UR AUTO: YELLOW
CREAT SERPL-MCNC: 1.48 MG/DL (ref 0.73–1.18)
EOSINOPHIL # BLD AUTO: 0.08 X10(3)/MCL (ref 0–0.9)
EOSINOPHIL NFR BLD AUTO: 1.7 %
ERYTHROCYTE [DISTWIDTH] IN BLOOD BY AUTOMATED COUNT: 12 % (ref 11.5–17)
FLUAV AG UPPER RESP QL IA.RAPID: NOT DETECTED
FLUBV AG UPPER RESP QL IA.RAPID: NOT DETECTED
GFR SERPLBLD CREATININE-BSD FMLA CKD-EPI: 47 MLS/MIN/1.73/M2
GLOBULIN SER-MCNC: 2.8 GM/DL (ref 2.4–3.5)
GLUCOSE SERPL-MCNC: 98 MG/DL (ref 82–115)
GLUCOSE UR QL STRIP.AUTO: NEGATIVE
HCT VFR BLD AUTO: 35.1 % (ref 42–52)
HGB BLD-MCNC: 11.7 G/DL (ref 14–18)
IMM GRANULOCYTES # BLD AUTO: 0.01 X10(3)/MCL (ref 0–0.04)
IMM GRANULOCYTES NFR BLD AUTO: 0.2 %
KETONES UR QL STRIP.AUTO: NEGATIVE
LEUKOCYTE ESTERASE UR QL STRIP.AUTO: ABNORMAL
LYMPHOCYTES # BLD AUTO: 1.01 X10(3)/MCL (ref 0.6–4.6)
LYMPHOCYTES NFR BLD AUTO: 21.9 %
MCH RBC QN AUTO: 31.6 PG (ref 27–31)
MCHC RBC AUTO-ENTMCNC: 33.3 G/DL (ref 33–36)
MCV RBC AUTO: 94.9 FL (ref 80–94)
MONOCYTES # BLD AUTO: 0.37 X10(3)/MCL (ref 0.1–1.3)
MONOCYTES NFR BLD AUTO: 8 %
NEUTROPHILS # BLD AUTO: 3.13 X10(3)/MCL (ref 2.1–9.2)
NEUTROPHILS NFR BLD AUTO: 67.8 %
NITRITE UR QL STRIP.AUTO: NEGATIVE
PH UR STRIP.AUTO: 6.5 [PH]
PLATELET # BLD AUTO: 140 X10(3)/MCL (ref 130–400)
PMV BLD AUTO: 10.5 FL (ref 7.4–10.4)
POC CARDIAC TROPONIN I: 0 NG/ML (ref 0–0.08)
POC CARDIAC TROPONIN I: 0.01 NG/ML (ref 0–0.08)
POTASSIUM SERPL-SCNC: 4.4 MMOL/L (ref 3.5–5.1)
PROT SERPL-MCNC: 6.8 GM/DL (ref 5.8–7.6)
PROT UR QL STRIP.AUTO: ABNORMAL
RBC # BLD AUTO: 3.7 X10(6)/MCL (ref 4.7–6.1)
RBC #/AREA URNS AUTO: ABNORMAL /HPF
RBC UR QL AUTO: NEGATIVE
RSV A 5' UTR RNA NPH QL NAA+PROBE: NOT DETECTED
SAMPLE: NORMAL
SAMPLE: NORMAL
SARS-COV-2 RNA RESP QL NAA+PROBE: NOT DETECTED
SODIUM SERPL-SCNC: 131 MMOL/L (ref 136–145)
SP GR UR STRIP.AUTO: 1.02 (ref 1–1.03)
SQUAMOUS #/AREA URNS AUTO: ABNORMAL /HPF
UROBILINOGEN UR STRIP-ACNC: 0.2
WBC # SPEC AUTO: 4.62 X10(3)/MCL (ref 4.5–11.5)
WBC #/AREA URNS AUTO: ABNORMAL /HPF

## 2023-11-25 PROCEDURE — 93010 EKG 12-LEAD: ICD-10-PCS | Mod: ,,, | Performed by: INTERNAL MEDICINE

## 2023-11-25 PROCEDURE — 81001 URINALYSIS AUTO W/SCOPE: CPT | Performed by: FAMILY MEDICINE

## 2023-11-25 PROCEDURE — 87186 SC STD MICRODIL/AGAR DIL: CPT | Performed by: FAMILY MEDICINE

## 2023-11-25 PROCEDURE — 85025 COMPLETE CBC W/AUTO DIFF WBC: CPT | Performed by: FAMILY MEDICINE

## 2023-11-25 PROCEDURE — 87086 URINE CULTURE/COLONY COUNT: CPT | Performed by: FAMILY MEDICINE

## 2023-11-25 PROCEDURE — 83880 ASSAY OF NATRIURETIC PEPTIDE: CPT | Performed by: FAMILY MEDICINE

## 2023-11-25 PROCEDURE — 84484 ASSAY OF TROPONIN QUANT: CPT

## 2023-11-25 PROCEDURE — 93005 ELECTROCARDIOGRAM TRACING: CPT

## 2023-11-25 PROCEDURE — 0241U COVID/RSV/FLU A&B PCR: CPT | Performed by: FAMILY MEDICINE

## 2023-11-25 PROCEDURE — 25000003 PHARM REV CODE 250: Performed by: FAMILY MEDICINE

## 2023-11-25 PROCEDURE — 63600175 PHARM REV CODE 636 W HCPCS: Performed by: FAMILY MEDICINE

## 2023-11-25 PROCEDURE — 96374 THER/PROPH/DIAG INJ IV PUSH: CPT

## 2023-11-25 PROCEDURE — 80053 COMPREHEN METABOLIC PANEL: CPT | Performed by: FAMILY MEDICINE

## 2023-11-25 PROCEDURE — 99285 EMERGENCY DEPT VISIT HI MDM: CPT | Mod: 25

## 2023-11-25 PROCEDURE — 93010 ELECTROCARDIOGRAM REPORT: CPT | Mod: ,,, | Performed by: INTERNAL MEDICINE

## 2023-11-25 RX ORDER — KETOROLAC TROMETHAMINE 30 MG/ML
15 INJECTION, SOLUTION INTRAMUSCULAR; INTRAVENOUS
Status: COMPLETED | OUTPATIENT
Start: 2023-11-25 | End: 2023-11-25

## 2023-11-25 RX ORDER — ASPIRIN 325 MG
325 TABLET, DELAYED RELEASE (ENTERIC COATED) ORAL
Status: COMPLETED | OUTPATIENT
Start: 2023-11-25 | End: 2023-11-25

## 2023-11-25 RX ORDER — MAG HYDROX/ALUMINUM HYD/SIMETH 200-200-20
30 SUSPENSION, ORAL (FINAL DOSE FORM) ORAL
Status: COMPLETED | OUTPATIENT
Start: 2023-11-25 | End: 2023-11-25

## 2023-11-25 RX ADMIN — KETOROLAC TROMETHAMINE 15 MG: 30 INJECTION, SOLUTION INTRAMUSCULAR; INTRAVENOUS at 02:11

## 2023-11-25 RX ADMIN — ALUMINUM HYDROXIDE, MAGNESIUM HYDROXIDE, AND SIMETHICONE 30 ML: 1200; 120; 1200 SUSPENSION ORAL at 02:11

## 2023-11-25 RX ADMIN — ASPIRIN 325 MG: 325 TABLET, COATED ORAL at 04:11

## 2023-11-27 ENCOUNTER — HOSPITAL ENCOUNTER (EMERGENCY)
Facility: HOSPITAL | Age: 83
Discharge: HOME | End: 2023-11-27
Attending: EMERGENCY MEDICINE
Payer: MEDICARE

## 2023-11-27 VITALS
HEIGHT: 71 IN | RESPIRATION RATE: 16 BRPM | SYSTOLIC BLOOD PRESSURE: 163 MMHG | DIASTOLIC BLOOD PRESSURE: 76 MMHG | OXYGEN SATURATION: 100 % | HEART RATE: 51 BPM | BODY MASS INDEX: 25.9 KG/M2 | WEIGHT: 185 LBS | TEMPERATURE: 98 F

## 2023-11-27 DIAGNOSIS — R07.9 CHEST PAIN, UNSPECIFIED TYPE: Primary | ICD-10-CM

## 2023-11-27 DIAGNOSIS — R07.9 CHEST PAIN: ICD-10-CM

## 2023-11-27 LAB
ALBUMIN SERPL-MCNC: 4.3 G/DL (ref 3.4–4.8)
ALBUMIN/GLOB SERPL: 1.3 RATIO (ref 1.1–2)
ALP SERPL-CCNC: 126 UNIT/L (ref 40–150)
ALT SERPL-CCNC: 40 UNIT/L (ref 0–55)
AST SERPL-CCNC: 38 UNIT/L (ref 5–34)
BASOPHILS # BLD AUTO: 0.01 X10(3)/MCL
BASOPHILS NFR BLD AUTO: 0.2 %
BILIRUB SERPL-MCNC: 1.2 MG/DL
BUN SERPL-MCNC: 21.8 MG/DL (ref 8.4–25.7)
CALCIUM SERPL-MCNC: 9 MG/DL (ref 8.8–10)
CHLORIDE SERPL-SCNC: 96 MMOL/L (ref 98–107)
CO2 SERPL-SCNC: 27 MMOL/L (ref 23–31)
CREAT SERPL-MCNC: 1.53 MG/DL (ref 0.73–1.18)
D DIMER PPP IA.FEU-MCNC: 0.6 UG/ML FEU (ref 0–0.5)
EOSINOPHIL # BLD AUTO: 0.09 X10(3)/MCL (ref 0–0.9)
EOSINOPHIL NFR BLD AUTO: 1.7 %
ERYTHROCYTE [DISTWIDTH] IN BLOOD BY AUTOMATED COUNT: 12 % (ref 11.5–17)
GFR SERPLBLD CREATININE-BSD FMLA CKD-EPI: 45 MLS/MIN/1.73/M2
GLOBULIN SER-MCNC: 3.2 GM/DL (ref 2.4–3.5)
GLUCOSE SERPL-MCNC: 97 MG/DL (ref 82–115)
HCT VFR BLD AUTO: 37.6 % (ref 42–52)
HGB BLD-MCNC: 12.6 G/DL (ref 14–18)
IMM GRANULOCYTES # BLD AUTO: 0 X10(3)/MCL (ref 0–0.04)
IMM GRANULOCYTES NFR BLD AUTO: 0 %
LYMPHOCYTES # BLD AUTO: 0.92 X10(3)/MCL (ref 0.6–4.6)
LYMPHOCYTES NFR BLD AUTO: 17.4 %
MCH RBC QN AUTO: 31.7 PG (ref 27–31)
MCHC RBC AUTO-ENTMCNC: 33.5 G/DL (ref 33–36)
MCV RBC AUTO: 94.5 FL (ref 80–94)
MONOCYTES # BLD AUTO: 0.4 X10(3)/MCL (ref 0.1–1.3)
MONOCYTES NFR BLD AUTO: 7.5 %
NEUTROPHILS # BLD AUTO: 3.88 X10(3)/MCL (ref 2.1–9.2)
NEUTROPHILS NFR BLD AUTO: 73.2 %
PLATELET # BLD AUTO: 156 X10(3)/MCL (ref 130–400)
PMV BLD AUTO: 10.9 FL (ref 7.4–10.4)
POC CARDIAC TROPONIN I: 0.01 NG/ML (ref 0–0.08)
POTASSIUM SERPL-SCNC: 4.5 MMOL/L (ref 3.5–5.1)
PROT SERPL-MCNC: 7.5 GM/DL (ref 5.8–7.6)
RBC # BLD AUTO: 3.98 X10(6)/MCL (ref 4.7–6.1)
SAMPLE: NORMAL
SODIUM SERPL-SCNC: 131 MMOL/L (ref 136–145)
WBC # SPEC AUTO: 5.3 X10(3)/MCL (ref 4.5–11.5)

## 2023-11-27 PROCEDURE — 84484 ASSAY OF TROPONIN QUANT: CPT

## 2023-11-27 PROCEDURE — 93010 EKG 12-LEAD: ICD-10-PCS | Mod: ,,, | Performed by: STUDENT IN AN ORGANIZED HEALTH CARE EDUCATION/TRAINING PROGRAM

## 2023-11-27 PROCEDURE — 99285 EMERGENCY DEPT VISIT HI MDM: CPT | Mod: 25

## 2023-11-27 PROCEDURE — 93005 ELECTROCARDIOGRAM TRACING: CPT

## 2023-11-27 PROCEDURE — 80053 COMPREHEN METABOLIC PANEL: CPT | Performed by: EMERGENCY MEDICINE

## 2023-11-27 PROCEDURE — 85025 COMPLETE CBC W/AUTO DIFF WBC: CPT | Performed by: EMERGENCY MEDICINE

## 2023-11-27 PROCEDURE — 85379 FIBRIN DEGRADATION QUANT: CPT | Performed by: EMERGENCY MEDICINE

## 2023-11-27 PROCEDURE — 93010 ELECTROCARDIOGRAM REPORT: CPT | Mod: ,,, | Performed by: STUDENT IN AN ORGANIZED HEALTH CARE EDUCATION/TRAINING PROGRAM

## 2023-11-27 NOTE — ED PROVIDER NOTES
Encounter Date: 11/27/2023       History     Chief Complaint   Patient presents with    Chest Pain     States he was here day before yesterday for chest pain. It had gotten better but came back today.  Left sided cp with sob 10/10    Shortness of Breath     This 83-year-old man presents with complaints of left-sided chest pain for the past 2 weeks.  He states the pain is sharp and intermittent.  It is associated with nausea and some shortness of breath.  He states it happens whenever he tries to walk.  He denies fever chills sweats, cough, vomiting, diarrhea or constipation, dysuria.  The patient states he has a hiatal hernia treated with omeprazole and he thinks that is the cause of his pain.  He has been trying to get an appointment with Dr Brower. [facility]      Review of patient's allergies indicates:  No Known Allergies  Past Medical History:   Diagnosis Date    Bladder cancer     Coronary artery disease     GERD (gastroesophageal reflux disease)     Hypertension     Renal cancer     Testicular pain      Past Surgical History:   Procedure Laterality Date    CHOLECYSTECTOMY      COLONOSCOPY      CORONARY ANGIOPLASTY WITH STENT PLACEMENT      ESOPHAGOGASTRODUODENOSCOPY      HERNIA REPAIR Bilateral     NEPHRECTOMY      PENILE PROSTHESIS IMPLANT      PROSTATE SURGERY  10/2022    Removal with bladder removal    Removal of bladder tumors      ROBOT-ASSISTED LAPAROSCOPIC CYSTECTOMY N/A 10/11/2022    Procedure: ROBOTIC CYSTECTOMY;  Surgeon: Flip Casas MD;  Location: [facility];  Service: Urology;  Laterality: N/A;  ROBOT ASSISTED CYSTECTOMY WITH URINARY DIVERSION //   ANY INDICATED PROCEDURES    ROBOT-ASSISTED LAPAROSCOPIC PROSTATECTOMY USING DA RASHEED XI  10/11/2022    Procedure: XI ROBOTIC PROSTATECTOMY;  Surgeon: Flip Casas MD;  Location: [facility];  Service: Urology;;    SMALL INTESTINE SURGERY  10/2022    Ileistomy    TURBT, WITH BLUE LIGHT CYSTOSCOPY AND CYSVIEW      urostomy        Family History   Problem Relation Age of Onset    Depression Mother             Diabetes Mother             Cancer Brother              Social History     Tobacco Use    Smoking status: Former     Current packs/day: 1.50     Average packs/day: 1.5 packs/day for 15.0 years (22.5 ttl pk-yrs)     Types: Cigarettes    Smokeless tobacco: Never    Tobacco comments:     Dont remember dates. - appx dates 6794-1463   Substance Use Topics    Alcohol use: Never    Drug use: Never     Review of Systems   Constitutional:  Negative for chills and fever.   HENT:  Positive for rhinorrhea. Negative for congestion and sore throat.    Respiratory:  Positive for shortness of breath. Negative for cough.    Cardiovascular:  Positive for chest pain. Negative for leg swelling.   Gastrointestinal:  Positive for nausea. Negative for abdominal pain.   Genitourinary:  Negative for dysuria.   Musculoskeletal:  Negative for back pain.   Skin:  Negative for rash.   Neurological:  Negative for weakness.   Hematological:  Does not bruise/bleed easily.       Physical Exam     Initial Vitals   BP Pulse Resp Temp SpO2   23 1257 23 0959 23 0959 23 0959 23 0959   (!) 152/74 (!) 53 18 97.5 °F (36.4 °C) 100 %      MAP       --                Physical Exam    Nursing note and vitals reviewed.  Constitutional: He appears well-developed and well-nourished.   HENT:   Head: Normocephalic and atraumatic.   Mouth/Throat: Mucous membranes are normal.   Eyes: EOM are normal. Pupils are equal, round, and reactive to light.   Neck: Neck supple.   Normal range of motion.  Cardiovascular:  Normal rate, regular rhythm, normal heart sounds and intact distal pulses.           Pulmonary/Chest: Breath sounds normal.   Abdominal: Abdomen is soft. Bowel sounds are normal.   Musculoskeletal:         General: Normal range of motion.      Cervical back: Normal range of motion and neck supple.     Neurological: He is  alert and oriented to person, place, and time. He has normal strength.   Skin: Skin is warm and dry. Capillary refill takes less than 2 seconds.   Psychiatric: He has a normal mood and affect. His behavior is normal. Judgment and thought content normal.         ED Course   Procedures  Labs Reviewed   COMPREHENSIVE METABOLIC PANEL - Abnormal; Notable for the following components:       Result Value    Sodium Level 131 (*)     Chloride 96 (*)     Creatinine 1.53 (*)     Aspartate Aminotransferase 38 (*)     All other components within normal limits   D DIMER, QUANTITATIVE - Abnormal; Notable for the following components:    D-Dimer 0.60 (*)     All other components within normal limits   CBC WITH DIFFERENTIAL - Abnormal; Notable for the following components:    RBC 3.98 (*)     Hgb 12.6 (*)     Hct 37.6 (*)     MCV 94.5 (*)     MCH 31.7 (*)     MPV 10.9 (*)     All other components within normal limits   CBC W/ AUTO DIFFERENTIAL    Narrative:     The following orders were created for panel order CBC auto differential.  Procedure                               Abnormality         Status                     ---------                               -----------         ------                     CBC with Differential[[id number]]       Abnormal            Final result                 Please view results for these tests on the individual orders.   TROPONIN ISTAT   POCT TROPONIN     EKG Readings: (Independently Interpreted)   Rhythm: Sinus Bradycardia. Heart Rate: 53. Conduction: Normal. Axis: Normal.   11/27/23 0959       Imaging Results              X-Ray Chest PA And Lateral (Final result)  Result time 11/27/23 11:28:00      Final result by Rip Cullen MD (11/27/23 11:28:00)                   Impression:      No acute cardiopulmonary process identified.      Electronically signed by: Rip Cullen  Date:    11/27/2023  Time:    11:28               Narrative:    EXAMINATION:  XR CHEST PA AND LATERAL    CLINICAL  HISTORY:  Chest pain, unspecified    TECHNIQUE:  Two views    COMPARISON:  November 25, 2023.    FINDINGS:  Cardiopericardial silhouette is within normal limits. Lungs are without dense focal or segmental consolidation, congestion, pleural effusion or pneumothorax.                                       Medications - No data to display  Medical Decision Making  This 83-year-old man presents with complaints of left-sided chest pain for the past 2 weeks.  He states the pain is sharp and intermittent.  It is associated with nausea and some shortness of breath.  He states it happens whenever he tries to walk.  He denies fever chills sweats, cough, vomiting, diarrhea or constipation, dysuria.  The patient states he has a hiatal hernia treated with omeprazole and he thinks that is the cause of his pain.  He has been trying to get an appointment with Dr Brower. [facility]    Amount and/or Complexity of Data Reviewed  Labs: ordered. Decision-making details documented in ED Course.  Radiology: ordered. Decision-making details documented in ED Course.  Discussion of management or test interpretation with external provider(s): Mr. Sarah varela presents with complaints of left-sided chest pain for the past 2-3 weeks.  He describes it as sharp and intermittent associated with nausea and shortness of breath.  It occurs when he tries to walk.  Evaluation has been unrevealing as to the cause of his pain.  He has appointments coming up with Dr. Kim and Dr. Mayo.                                       Clinical Impression:  Final diagnoses:  [R07.9] Chest pain  [R07.9] Chest pain, unspecified type (Primary)          ED Disposition Condition    Discharge Stable          ED Prescriptions    None       Follow-up Information       Follow up With Specialties Details Why Contact David Del Rio Jr., MD Family Medicine  keep appointment [address] [facility] [facility]  [address]  [place]  [address]  [phone]      Chao Mayo MD Cardiovascular Disease, Cardiology  keep appointment [address]   [facility] [address]  [phone]               Sujit Rosenthal MD  11/27/23 5475

## 2023-11-28 ENCOUNTER — LAB VISIT (OUTPATIENT)
Dept: LAB | Facility: HOSPITAL | Age: 83
End: 2023-11-28
Attending: FAMILY MEDICINE
Payer: MEDICARE

## 2023-11-28 DIAGNOSIS — R06.09 DYSPNEA ON EXERTION: ICD-10-CM

## 2023-11-28 DIAGNOSIS — C67.0 MALIGNANT NEOPLASM OF TRIGONE OF URINARY BLADDER: ICD-10-CM

## 2023-11-28 DIAGNOSIS — I25.10 CORONARY ATHEROSCLEROSIS OF NATIVE CORONARY ARTERY: ICD-10-CM

## 2023-11-28 DIAGNOSIS — N40.0 BENIGN PROSTATIC HYPERPLASIA, UNSPECIFIED WHETHER LOWER URINARY TRACT SYMPTOMS PRESENT: ICD-10-CM

## 2023-11-28 DIAGNOSIS — E78.5 HYPERLIPIDEMIA, UNSPECIFIED HYPERLIPIDEMIA TYPE: ICD-10-CM

## 2023-11-28 DIAGNOSIS — K44.9 HIATAL HERNIA: ICD-10-CM

## 2023-11-28 DIAGNOSIS — I10 ESSENTIAL HYPERTENSION, MALIGNANT: Primary | ICD-10-CM

## 2023-11-28 DIAGNOSIS — M19.90 SENILE ARTHRITIS: ICD-10-CM

## 2023-11-28 DIAGNOSIS — H25.813 COMBINED FORMS OF AGE-RELATED CATARACT OF BOTH EYES: ICD-10-CM

## 2023-11-28 DIAGNOSIS — D64.9 ANEMIA, UNSPECIFIED TYPE: ICD-10-CM

## 2023-11-28 DIAGNOSIS — Z12.5 SPECIAL SCREENING FOR MALIGNANT NEOPLASM OF PROSTATE: ICD-10-CM

## 2023-11-28 DIAGNOSIS — R07.9 CHEST PAIN, UNSPECIFIED TYPE: ICD-10-CM

## 2023-11-28 DIAGNOSIS — N18.9 CHRONIC KIDNEY DISEASE, UNSPECIFIED: ICD-10-CM

## 2023-11-28 DIAGNOSIS — C64.2 MALIGNANT NEOPLASM OF LEFT KIDNEY, EXCEPT RENAL PELVIS: ICD-10-CM

## 2023-11-28 LAB
ALBUMIN SERPL-MCNC: 4.4 G/DL (ref 3.4–4.8)
ALP SERPL-CCNC: 108 UNIT/L (ref 40–150)
ALT SERPL-CCNC: 38 UNIT/L (ref 0–55)
ANION GAP SERPL CALC-SCNC: 8 MEQ/L
AST SERPL-CCNC: 32 UNIT/L (ref 5–34)
BACTERIA UR CULT: ABNORMAL
BASOPHILS # BLD AUTO: 0.02 X10(3)/MCL
BASOPHILS NFR BLD AUTO: 0.4 %
BILIRUB SERPL-MCNC: 1.6 MG/DL
BILIRUBIN DIRECT+TOT PNL SERPL-MCNC: 0.6 MG/DL (ref 0–?)
BILIRUBIN DIRECT+TOT PNL SERPL-MCNC: 1 MG/DL (ref 0–0.8)
BUN SERPL-MCNC: 18.6 MG/DL (ref 8.4–25.7)
CALCIUM SERPL-MCNC: 9.2 MG/DL (ref 8.8–10)
CHLORIDE SERPL-SCNC: 97 MMOL/L (ref 98–107)
CHOLEST SERPL-MCNC: 106 MG/DL
CHOLEST/HDLC SERPL: 2 {RATIO} (ref 0–5)
CO2 SERPL-SCNC: 28 MMOL/L (ref 23–31)
CREAT SERPL-MCNC: 1.37 MG/DL (ref 0.73–1.18)
CREAT/UREA NIT SERPL: 14
EOSINOPHIL # BLD AUTO: 0.07 X10(3)/MCL (ref 0–0.9)
EOSINOPHIL NFR BLD AUTO: 1.5 %
ERYTHROCYTE [DISTWIDTH] IN BLOOD BY AUTOMATED COUNT: 12 % (ref 11.5–17)
FERRITIN SERPL-MCNC: 99.6 NG/ML (ref 21.81–274.66)
FOLATE SERPL-MCNC: 16.7 NG/ML (ref 7–31.4)
FT4I SERPL CALC-MCNC: 3.34 (ref 2.6–3.6)
GFR SERPLBLD CREATININE-BSD FMLA CKD-EPI: 52 MLS/MIN/1.73/M2
GLUCOSE SERPL-MCNC: 96 MG/DL (ref 82–115)
HCT VFR BLD AUTO: 37.4 % (ref 42–52)
HDLC SERPL-MCNC: 43 MG/DL (ref 35–60)
HGB BLD-MCNC: 12.5 G/DL (ref 14–18)
IMM GRANULOCYTES # BLD AUTO: 0.01 X10(3)/MCL (ref 0–0.04)
IMM GRANULOCYTES NFR BLD AUTO: 0.2 %
IRON SATN MFR SERPL: 39 % (ref 20–50)
IRON SERPL-MCNC: 113 UG/DL (ref 65–175)
LDLC SERPL CALC-MCNC: 48 MG/DL (ref 50–140)
LYMPHOCYTES # BLD AUTO: 0.99 X10(3)/MCL (ref 0.6–4.6)
LYMPHOCYTES NFR BLD AUTO: 21 %
MCH RBC QN AUTO: 31.2 PG (ref 27–31)
MCHC RBC AUTO-ENTMCNC: 33.4 G/DL (ref 33–36)
MCV RBC AUTO: 93.3 FL (ref 80–94)
MONOCYTES # BLD AUTO: 0.36 X10(3)/MCL (ref 0.1–1.3)
MONOCYTES NFR BLD AUTO: 7.6 %
NEUTROPHILS # BLD AUTO: 3.27 X10(3)/MCL (ref 2.1–9.2)
NEUTROPHILS NFR BLD AUTO: 69.3 %
PLATELET # BLD AUTO: 157 X10(3)/MCL (ref 130–400)
PMV BLD AUTO: 10.4 FL (ref 7.4–10.4)
POTASSIUM SERPL-SCNC: 4.9 MMOL/L (ref 3.5–5.1)
PROT SERPL-MCNC: 7.2 GM/DL (ref 5.8–7.6)
PSA SERPL-MCNC: <0.1 NG/ML
RBC # BLD AUTO: 4.01 X10(6)/MCL (ref 4.7–6.1)
SODIUM SERPL-SCNC: 133 MMOL/L (ref 136–145)
T3RU NFR SERPL: 36.9 % (ref 31–39)
T4 SERPL-MCNC: 9.06 UG/DL (ref 4.87–11.72)
TIBC SERPL-MCNC: 179 UG/DL (ref 69–240)
TIBC SERPL-MCNC: 292 UG/DL (ref 250–450)
TRANSFERRIN SERPL-MCNC: 270 MG/DL
TRIGL SERPL-MCNC: 73 MG/DL (ref 34–140)
TSH SERPL-ACNC: 1.92 UIU/ML (ref 0.35–4.94)
VIT B12 SERPL-MCNC: 510 PG/ML (ref 213–816)
VLDLC SERPL CALC-MCNC: 15 MG/DL
WBC # SPEC AUTO: 4.72 X10(3)/MCL (ref 4.5–11.5)

## 2023-11-28 PROCEDURE — 36415 COLL VENOUS BLD VENIPUNCTURE: CPT

## 2023-11-28 PROCEDURE — 84436 ASSAY OF TOTAL THYROXINE: CPT

## 2023-11-28 PROCEDURE — 82607 VITAMIN B-12: CPT

## 2023-11-28 PROCEDURE — 82728 ASSAY OF FERRITIN: CPT

## 2023-11-28 PROCEDURE — 80048 BASIC METABOLIC PNL TOTAL CA: CPT

## 2023-11-28 PROCEDURE — 80076 HEPATIC FUNCTION PANEL: CPT

## 2023-11-28 PROCEDURE — 80061 LIPID PANEL: CPT

## 2023-11-28 PROCEDURE — 83540 ASSAY OF IRON: CPT

## 2023-11-28 PROCEDURE — 85025 COMPLETE CBC W/AUTO DIFF WBC: CPT

## 2023-11-28 PROCEDURE — 82746 ASSAY OF FOLIC ACID SERUM: CPT

## 2023-11-28 PROCEDURE — 84443 ASSAY THYROID STIM HORMONE: CPT

## 2023-11-28 PROCEDURE — 84153 ASSAY OF PSA TOTAL: CPT

## 2023-12-01 ENCOUNTER — HOSPITAL ENCOUNTER (INPATIENT)
Facility: HOSPITAL | Age: 83
LOS: 1 days | Discharge: HOME | DRG: 324 | End: 2023-12-02
Attending: EMERGENCY MEDICINE | Admitting: INTERNAL MEDICINE
Payer: OTHER GOVERNMENT

## 2023-12-01 DIAGNOSIS — R07.9 ACUTE CHEST PAIN: Primary | ICD-10-CM

## 2023-12-01 DIAGNOSIS — I25.10 CAD (CORONARY ARTERY DISEASE): ICD-10-CM

## 2023-12-01 DIAGNOSIS — R06.09 DOE (DYSPNEA ON EXERTION): ICD-10-CM

## 2023-12-01 DIAGNOSIS — R07.9 CHEST PAIN: ICD-10-CM

## 2023-12-01 LAB
ALBUMIN SERPL-MCNC: 4.1 G/DL (ref 3.4–4.8)
ALBUMIN/GLOB SERPL: 1.6 RATIO (ref 1.1–2)
ALP SERPL-CCNC: 106 UNIT/L (ref 40–150)
ALT SERPL-CCNC: 27 UNIT/L (ref 0–55)
AST SERPL-CCNC: 23 UNIT/L (ref 5–34)
BASOPHILS # BLD AUTO: 0.03 X10(3)/MCL
BASOPHILS NFR BLD AUTO: 0.6 %
BILIRUB SERPL-MCNC: 1.1 MG/DL
BUN SERPL-MCNC: 18.3 MG/DL (ref 8.4–25.7)
CALCIUM SERPL-MCNC: 8.9 MG/DL (ref 8.8–10)
CHLORIDE SERPL-SCNC: 97 MMOL/L (ref 98–107)
CO2 SERPL-SCNC: 27 MMOL/L (ref 23–31)
CREAT SERPL-MCNC: 1.31 MG/DL (ref 0.73–1.18)
D DIMER PPP IA.FEU-MCNC: <0.27 UG/ML FEU (ref 0–0.5)
EOSINOPHIL # BLD AUTO: 0.11 X10(3)/MCL (ref 0–0.9)
EOSINOPHIL NFR BLD AUTO: 2.1 %
ERYTHROCYTE [DISTWIDTH] IN BLOOD BY AUTOMATED COUNT: 12 % (ref 11.5–17)
GFR SERPLBLD CREATININE-BSD FMLA CKD-EPI: 54 MLS/MIN/1.73/M2
GLOBULIN SER-MCNC: 2.5 GM/DL (ref 2.4–3.5)
GLUCOSE SERPL-MCNC: 98 MG/DL (ref 82–115)
HCT VFR BLD AUTO: 34.7 % (ref 42–52)
HGB BLD-MCNC: 12.6 G/DL (ref 14–18)
IMM GRANULOCYTES # BLD AUTO: 0.02 X10(3)/MCL (ref 0–0.04)
IMM GRANULOCYTES NFR BLD AUTO: 0.4 %
LYMPHOCYTES # BLD AUTO: 0.92 X10(3)/MCL (ref 0.6–4.6)
LYMPHOCYTES NFR BLD AUTO: 17.3 %
MAGNESIUM SERPL-MCNC: 1.8 MG/DL (ref 1.6–2.6)
MCH RBC QN AUTO: 32.8 PG (ref 27–31)
MCHC RBC AUTO-ENTMCNC: 36.3 G/DL (ref 33–36)
MCV RBC AUTO: 90.4 FL (ref 80–94)
MONOCYTES # BLD AUTO: 0.36 X10(3)/MCL (ref 0.1–1.3)
MONOCYTES NFR BLD AUTO: 6.8 %
NEUTROPHILS # BLD AUTO: 3.87 X10(3)/MCL (ref 2.1–9.2)
NEUTROPHILS NFR BLD AUTO: 72.8 %
NRBC BLD AUTO-RTO: 0 %
OSMOLALITY SERPL: 280 MOSM/KG (ref 280–300)
OSMOLALITY UR: 237 MOSM/KG (ref 300–1300)
PHOSPHATE SERPL-MCNC: 3 MG/DL (ref 2.3–4.7)
PLATELET # BLD AUTO: 147 X10(3)/MCL (ref 130–400)
PMV BLD AUTO: 10.9 FL (ref 7.4–10.4)
POTASSIUM SERPL-SCNC: 4.5 MMOL/L (ref 3.5–5.1)
PROT SERPL-MCNC: 6.6 GM/DL (ref 5.8–7.6)
RBC # BLD AUTO: 3.84 X10(6)/MCL (ref 4.7–6.1)
SODIUM SERPL-SCNC: 130 MMOL/L (ref 136–145)
TROPONIN I SERPL-MCNC: <0.01 NG/ML (ref 0–0.04)
URATE SERPL-MCNC: 4.6 MG/DL (ref 3.5–7.2)
WBC # SPEC AUTO: 5.31 X10(3)/MCL (ref 4.5–11.5)

## 2023-12-01 PROCEDURE — 63600175 PHARM REV CODE 636 W HCPCS: Performed by: INTERNAL MEDICINE

## 2023-12-01 PROCEDURE — 27201423 OPTIME MED/SURG SUP & DEVICES STERILE SUPPLY: Performed by: INTERNAL MEDICINE

## 2023-12-01 PROCEDURE — 0715T *HC CORONARY LITHOTRIPSY: CPT | Performed by: INTERNAL MEDICINE

## 2023-12-01 PROCEDURE — C1753 CATH, INTRAVAS ULTRASOUND: HCPCS | Performed by: INTERNAL MEDICINE

## 2023-12-01 PROCEDURE — 93010 EKG 12-LEAD: ICD-10-PCS | Mod: ,,, | Performed by: INTERNAL MEDICINE

## 2023-12-01 PROCEDURE — 25500020 PHARM REV CODE 255: Performed by: INTERNAL MEDICINE

## 2023-12-01 PROCEDURE — 80053 COMPREHEN METABOLIC PANEL: CPT | Performed by: NURSE PRACTITIONER

## 2023-12-01 PROCEDURE — 83930 ASSAY OF BLOOD OSMOLALITY: CPT | Performed by: INTERNAL MEDICINE

## 2023-12-01 PROCEDURE — 99152 MOD SED SAME PHYS/QHP 5/>YRS: CPT | Performed by: INTERNAL MEDICINE

## 2023-12-01 PROCEDURE — 85025 COMPLETE CBC W/AUTO DIFF WBC: CPT | Performed by: NURSE PRACTITIONER

## 2023-12-01 PROCEDURE — C1894 INTRO/SHEATH, NON-LASER: HCPCS | Performed by: INTERNAL MEDICINE

## 2023-12-01 PROCEDURE — 84550 ASSAY OF BLOOD/URIC ACID: CPT | Performed by: INTERNAL MEDICINE

## 2023-12-01 PROCEDURE — 84484 ASSAY OF TROPONIN QUANT: CPT | Performed by: NURSE PRACTITIONER

## 2023-12-01 PROCEDURE — 25000003 PHARM REV CODE 250: Performed by: INTERNAL MEDICINE

## 2023-12-01 PROCEDURE — 25000003 PHARM REV CODE 250: Performed by: NURSE PRACTITIONER

## 2023-12-01 PROCEDURE — 21400001 HC TELEMETRY ROOM

## 2023-12-01 PROCEDURE — 84100 ASSAY OF PHOSPHORUS: CPT | Performed by: INTERNAL MEDICINE

## 2023-12-01 PROCEDURE — C1769 GUIDE WIRE: HCPCS | Performed by: INTERNAL MEDICINE

## 2023-12-01 PROCEDURE — 83935 ASSAY OF URINE OSMOLALITY: CPT | Performed by: INTERNAL MEDICINE

## 2023-12-01 PROCEDURE — C1887 CATHETER, GUIDING: HCPCS | Performed by: INTERNAL MEDICINE

## 2023-12-01 PROCEDURE — 84300 ASSAY OF URINE SODIUM: CPT | Performed by: INTERNAL MEDICINE

## 2023-12-01 PROCEDURE — 85379 FIBRIN DEGRADATION QUANT: CPT | Performed by: NURSE PRACTITIONER

## 2023-12-01 PROCEDURE — 93010 ELECTROCARDIOGRAM REPORT: CPT | Mod: ,,, | Performed by: INTERNAL MEDICINE

## 2023-12-01 PROCEDURE — 93005 ELECTROCARDIOGRAM TRACING: CPT

## 2023-12-01 PROCEDURE — 99285 EMERGENCY DEPT VISIT HI MDM: CPT | Mod: 25

## 2023-12-01 PROCEDURE — C1725 CATH, TRANSLUMIN NON-LASER: HCPCS | Performed by: INTERNAL MEDICINE

## 2023-12-01 PROCEDURE — C9600 PERC DRUG-EL COR STENT SING: HCPCS | Mod: LD | Performed by: INTERNAL MEDICINE

## 2023-12-01 PROCEDURE — 93458 L HRT ARTERY/VENTRICLE ANGIO: CPT | Mod: 59 | Performed by: INTERNAL MEDICINE

## 2023-12-01 PROCEDURE — 83735 ASSAY OF MAGNESIUM: CPT | Performed by: INTERNAL MEDICINE

## 2023-12-01 PROCEDURE — C1874 STENT, COATED/COV W/DEL SYS: HCPCS | Performed by: INTERNAL MEDICINE

## 2023-12-01 PROCEDURE — C1761 OPTIME CATH, TRANSLUMINAL INTRAVASC LITHO, CORONARY: HCPCS | Performed by: INTERNAL MEDICINE

## 2023-12-01 PROCEDURE — 99153 MOD SED SAME PHYS/QHP EA: CPT | Performed by: INTERNAL MEDICINE

## 2023-12-01 PROCEDURE — 92978 ENDOLUMINL IVUS OCT C 1ST: CPT | Mod: LD | Performed by: INTERNAL MEDICINE

## 2023-12-01 DEVICE — EVEROLIMUS-ELUTING PLATINUM CHROMIUM CORONARY STENT SYSTEM
Type: IMPLANTABLE DEVICE | Site: CORONARY | Status: FUNCTIONAL
Brand: SYNERGY™ XD

## 2023-12-01 RX ORDER — MIDAZOLAM HYDROCHLORIDE 1 MG/ML
INJECTION INTRAMUSCULAR; INTRAVENOUS
Status: DISCONTINUED | OUTPATIENT
Start: 2023-12-01 | End: 2023-12-01 | Stop reason: HOSPADM

## 2023-12-01 RX ORDER — ACETAMINOPHEN 325 MG/1
650 TABLET ORAL EVERY 4 HOURS PRN
Status: DISCONTINUED | OUTPATIENT
Start: 2023-12-01 | End: 2023-12-02 | Stop reason: HOSPADM

## 2023-12-01 RX ORDER — ATROPINE SULFATE 0.1 MG/ML
0.5 INJECTION INTRAVENOUS
Status: DISCONTINUED | OUTPATIENT
Start: 2023-12-01 | End: 2023-12-02 | Stop reason: HOSPADM

## 2023-12-01 RX ORDER — CLOPIDOGREL BISULFATE 75 MG/1
75 TABLET ORAL DAILY
Status: DISCONTINUED | OUTPATIENT
Start: 2023-12-02 | End: 2023-12-02 | Stop reason: HOSPADM

## 2023-12-01 RX ORDER — SODIUM CHLORIDE 0.9 % (FLUSH) 0.9 %
10 SYRINGE (ML) INJECTION
Status: DISCONTINUED | OUTPATIENT
Start: 2023-12-01 | End: 2023-12-02 | Stop reason: HOSPADM

## 2023-12-01 RX ORDER — ACETAMINOPHEN 325 MG/1
650 TABLET ORAL EVERY 8 HOURS PRN
Status: DISCONTINUED | OUTPATIENT
Start: 2023-12-01 | End: 2023-12-02 | Stop reason: HOSPADM

## 2023-12-01 RX ORDER — LOSARTAN POTASSIUM 50 MG/1
50 TABLET ORAL DAILY
Status: DISCONTINUED | OUTPATIENT
Start: 2023-12-01 | End: 2023-12-02 | Stop reason: HOSPADM

## 2023-12-01 RX ORDER — FENTANYL CITRATE 50 UG/ML
INJECTION, SOLUTION INTRAMUSCULAR; INTRAVENOUS
Status: DISCONTINUED | OUTPATIENT
Start: 2023-12-01 | End: 2023-12-01 | Stop reason: HOSPADM

## 2023-12-01 RX ORDER — ACETAMINOPHEN 325 MG/1
650 TABLET ORAL EVERY 4 HOURS PRN
Status: DISCONTINUED | OUTPATIENT
Start: 2023-12-01 | End: 2023-12-01

## 2023-12-01 RX ORDER — MAG HYDROX/ALUMINUM HYD/SIMETH 200-200-20
SUSPENSION, ORAL (FINAL DOSE FORM) ORAL
Status: DISCONTINUED | OUTPATIENT
Start: 2023-12-01 | End: 2023-12-01 | Stop reason: HOSPADM

## 2023-12-01 RX ORDER — ONDANSETRON 2 MG/ML
4 INJECTION INTRAMUSCULAR; INTRAVENOUS EVERY 8 HOURS PRN
Status: DISCONTINUED | OUTPATIENT
Start: 2023-12-01 | End: 2023-12-02 | Stop reason: HOSPADM

## 2023-12-01 RX ORDER — NAPROXEN SODIUM 220 MG/1
81 TABLET, FILM COATED ORAL DAILY
Status: DISCONTINUED | OUTPATIENT
Start: 2023-12-02 | End: 2023-12-01

## 2023-12-01 RX ORDER — MAG HYDROX/ALUMINUM HYD/SIMETH 200-200-20
30 SUSPENSION, ORAL (FINAL DOSE FORM) ORAL
Status: DISCONTINUED | OUTPATIENT
Start: 2023-12-01 | End: 2023-12-02 | Stop reason: HOSPADM

## 2023-12-01 RX ORDER — ASPIRIN 81 MG/1
81 TABLET ORAL DAILY
Status: DISCONTINUED | OUTPATIENT
Start: 2023-12-02 | End: 2023-12-02 | Stop reason: HOSPADM

## 2023-12-01 RX ORDER — SODIUM CHLORIDE 9 MG/ML
INJECTION, SOLUTION INTRAVENOUS CONTINUOUS
Status: ACTIVE | OUTPATIENT
Start: 2023-12-01 | End: 2023-12-02

## 2023-12-01 RX ORDER — HYDRALAZINE HYDROCHLORIDE 20 MG/ML
10 INJECTION INTRAMUSCULAR; INTRAVENOUS EVERY 6 HOURS PRN
Status: DISCONTINUED | OUTPATIENT
Start: 2023-12-01 | End: 2023-12-02 | Stop reason: HOSPADM

## 2023-12-01 RX ORDER — LOSARTAN POTASSIUM 50 MG/1
50 TABLET ORAL DAILY
COMMUNITY

## 2023-12-01 RX ORDER — LIDOCAINE HYDROCHLORIDE 10 MG/ML
INJECTION, SOLUTION EPIDURAL; INFILTRATION; INTRACAUDAL; PERINEURAL
Status: DISCONTINUED | OUTPATIENT
Start: 2023-12-01 | End: 2023-12-01 | Stop reason: HOSPADM

## 2023-12-01 RX ORDER — ATORVASTATIN CALCIUM 40 MG/1
40 TABLET, FILM COATED ORAL NIGHTLY
Status: DISCONTINUED | OUTPATIENT
Start: 2023-12-01 | End: 2023-12-02 | Stop reason: HOSPADM

## 2023-12-01 RX ORDER — HEPARIN SODIUM 1000 [USP'U]/ML
INJECTION, SOLUTION INTRAVENOUS; SUBCUTANEOUS
Status: DISCONTINUED | OUTPATIENT
Start: 2023-12-01 | End: 2023-12-01 | Stop reason: HOSPADM

## 2023-12-01 RX ORDER — PANTOPRAZOLE SODIUM 40 MG/1
40 TABLET, DELAYED RELEASE ORAL DAILY
Status: DISCONTINUED | OUTPATIENT
Start: 2023-12-02 | End: 2023-12-02 | Stop reason: HOSPADM

## 2023-12-01 RX ORDER — CLOPIDOGREL 300 MG/1
TABLET, FILM COATED ORAL
Status: DISCONTINUED | OUTPATIENT
Start: 2023-12-01 | End: 2023-12-01 | Stop reason: HOSPADM

## 2023-12-01 RX ORDER — SUCRALFATE 1 G/1
1 TABLET ORAL 4 TIMES DAILY
COMMUNITY
Start: 2023-11-29 | End: 2023-12-13

## 2023-12-01 RX ORDER — ASPIRIN 325 MG
325 TABLET, DELAYED RELEASE (ENTERIC COATED) ORAL ONCE
Status: COMPLETED | OUTPATIENT
Start: 2023-12-01 | End: 2023-12-01

## 2023-12-01 RX ORDER — HYDROCODONE BITARTRATE AND ACETAMINOPHEN 5; 325 MG/1; MG/1
1 TABLET ORAL EVERY 4 HOURS PRN
Status: DISCONTINUED | OUTPATIENT
Start: 2023-12-01 | End: 2023-12-02 | Stop reason: HOSPADM

## 2023-12-01 RX ADMIN — ATORVASTATIN CALCIUM 40 MG: 40 TABLET, FILM COATED ORAL at 09:12

## 2023-12-01 RX ADMIN — HYDROCODONE BITARTRATE AND ACETAMINOPHEN 1 TABLET: 5; 325 TABLET ORAL at 09:12

## 2023-12-01 RX ADMIN — LOSARTAN POTASSIUM 50 MG: 50 TABLET, FILM COATED ORAL at 01:12

## 2023-12-01 RX ADMIN — SODIUM CHLORIDE: 9 INJECTION, SOLUTION INTRAVENOUS at 10:12

## 2023-12-01 RX ADMIN — ALUMINUM HYDROXIDE, MAGNESIUM HYDROXIDE, AND SIMETHICONE 30 ML: 200; 200; 20 SUSPENSION ORAL at 09:12

## 2023-12-01 RX ADMIN — ASPIRIN 325 MG: 325 TABLET, COATED ORAL at 01:12

## 2023-12-01 NOTE — ED PROVIDER NOTES
Encounter Date: 12/1/2023    SCRIBE #1 NOTE: I, Radha Davenport, am scribing for, and in the presence of,  Mak Ruiz MD. I have scribed the following portions of the note - Other sections scribed: HPI, ROS, PE, MDM.       History     Chief Complaint   Patient presents with    Chest Pain     Reports left sided chest pain/dizziness/SOB since Saturday. Was seen in the [place] ER on Saturday/Monday and sent home. Hx stent placement not on thinners.      83 year old male with a history of bladder cancer, CAD, GERD, HTN, hiatal hernia, renal cancer or testicular pain reports to the ED complaining of CP, SOB, and dizziness that began a week ago.  He has been to the ED two times in the last week ( Saturday, Monday) for the same complaints.  He say his GI doctor 2 days ago who wants to schedule an EGD but needs clearance from Dr. Mayo.  Reports called cardiologist's office this morning they recommended he come to the emergency department.  States that last night was the worst it has been a felt like he was going to die    The history is provided by the patient. No  was used.     Review of patient's allergies indicates:  No Known Allergies  Past Medical History:   Diagnosis Date    Bladder cancer     Coronary artery disease     GERD (gastroesophageal reflux disease)     Hypertension     Renal cancer     Testicular pain      Past Surgical History:   Procedure Laterality Date    CHOLECYSTECTOMY      COLONOSCOPY      CORONARY ANGIOPLASTY WITH STENT PLACEMENT      ESOPHAGOGASTRODUODENOSCOPY      HERNIA REPAIR Bilateral     NEPHRECTOMY      PENILE PROSTHESIS IMPLANT      PROSTATE SURGERY  10/2022    Removal with bladder removal    Removal of bladder tumors      ROBOT-ASSISTED LAPAROSCOPIC CYSTECTOMY N/A 10/11/2022    Procedure: ROBOTIC CYSTECTOMY;  Surgeon: Flip Casas MD;  Location: [facility];  Service: Urology;  Laterality: N/A;  ROBOT ASSISTED CYSTECTOMY WITH URINARY DIVERSION //    ANY INDICATED PROCEDURES    ROBOT-ASSISTED LAPAROSCOPIC PROSTATECTOMY USING DA RASHEDE XI  10/11/2022    Procedure: XI ROBOTIC PROSTATECTOMY;  Surgeon: Flip Casas MD;  Location: [facility] OR;  Service: Urology;;    SMALL INTESTINE SURGERY  10/2022    Ileistomy    TURBT, WITH BLUE LIGHT CYSTOSCOPY AND CYSVIEW      urostomy       Family History   Problem Relation Age of Onset    Depression Mother             Diabetes Mother             Cancer Brother              Social History     Tobacco Use    Smoking status: Former     Current packs/day: 1.50     Average packs/day: 1.5 packs/day for 15.0 years (22.5 ttl pk-yrs)     Types: Cigarettes    Smokeless tobacco: Never    Tobacco comments:     Dont remember dates. - appx dates 3214-5273   Substance Use Topics    Alcohol use: Never    Drug use: Never     Review of Systems   Constitutional:  Negative for chills and fever.   Respiratory:  Positive for shortness of breath. Negative for cough.    Cardiovascular:  Positive for chest pain.   Gastrointestinal:  Negative for abdominal pain, nausea and vomiting.   Musculoskeletal:  Negative for myalgias.   Neurological:  Positive for dizziness. Negative for syncope and headaches.   All other systems reviewed and are negative.      Physical Exam     Initial Vitals [23 0909]   BP Pulse Resp Temp SpO2   (!) 144/79 62 (!) 21 97.7 °F (36.5 °C) 98 %      MAP       --         Physical Exam    Constitutional: He appears well-developed and well-nourished. No distress.   HENT:   Head: Normocephalic and atraumatic.   Cardiovascular:  Normal rate.           Pulmonary/Chest: No respiratory distress. He has no wheezes. He has no rhonchi. He exhibits no tenderness.   Abdominal: Abdomen is soft. He exhibits no distension. There is no abdominal tenderness. There is no rebound and no guarding.   Musculoskeletal:         General: Normal range of motion.     Neurological: He is alert and oriented to person, place, and  time. He has normal strength.   Skin: Skin is warm and dry.   Psychiatric: He has a normal mood and affect.         ED Course   Procedures  Labs Reviewed   COMPREHENSIVE METABOLIC PANEL - Abnormal; Notable for the following components:       Result Value    Sodium Level 130 (*)     Chloride 97 (*)     Creatinine 1.31 (*)     All other components within normal limits   CBC WITH DIFFERENTIAL - Abnormal; Notable for the following components:    RBC 3.84 (*)     Hgb 12.6 (*)     Hct 34.7 (*)     MCH 32.8 (*)     MCHC 36.3 (*)     MPV 10.9 (*)     All other components within normal limits   TROPONIN I - Normal   CBC W/ AUTO DIFFERENTIAL    Narrative:     The following orders were created for panel order CBC Auto Differential.  Procedure                               Abnormality         Status                     ---------                               -----------         ------                     CBC with Differential[[id number]]       Abnormal            Final result                 Please view results for these tests on the individual orders.        ECG Results              EKG 12-lead (Preliminary result)  Result time 12/01/23 10:25:35      Wet Read by Mak Ruiz MD (12/01/23 10:25:35, [facility] - Emergency Dept, Emergency Medicine)    EKG at 0906.  64 beats per minute normal sinus rhythm no ectopy no ST elevation or depression.                                  Imaging Results              X-Ray Chest PA And Lateral (Final result)  Result time 12/01/23 10:03:10      Final result by Rip Cullen MD (12/01/23 10:03:10)                   Impression:      No acute cardiopulmonary process identified.      Electronically signed by: Rip Cullen  Date:    12/01/2023  Time:    10:03               Narrative:    EXAMINATION:  XR CHEST PA AND LATERAL    CLINICAL HISTORY:  chest pain;    TECHNIQUE:  Two-view    COMPARISON:  November 27, 2023.    FINDINGS:  Cardiopericardial silhouette is within normal  limits. Lungs are without dense focal or segmental consolidation, congestion, pleural effusion or pneumothorax.                                       Medications - No data to display  Medical Decision Making  Differential diagnosis includes, but is not limited to: CAD, hiatal hernia, gastritis, esophagitis.     Patient reports he is had these pains in the past current episode has been going for about 2 weeks off and on worsened over last few days and even more intense last night.  Seen at an outside emergency department twice and now today negative troponins each time EKG reassuring.  He states that he has had 1 stent placed years ago had similar pain prior to the stent placement it did not change the pain at all when stent was placed.  States he would a repeat angiogram after the stent placement and still found no other areas that required stenting.  There was concern this pain is related to hiatal hernia esophagitis or gastritis.  Planning for an EGD with Gastroenterology in the near future.  This is now his 3rd emergency department presentations the patient will be admitted for further evaluation with consult to Cardiology and GI.  I discussed case with the hospitalist who agreed to admit    Problems Addressed:  Acute chest pain: acute illness or injury that poses a threat to life or bodily functions  RIZVI (dyspnea on exertion): acute illness or injury that poses a threat to life or bodily functions    Amount and/or Complexity of Data Reviewed  Labs:  Decision-making details documented in ED Course.  Radiology: ordered.  ECG/medicine tests: ordered and independent interpretation performed.    Risk  Decision regarding hospitalization.            Scribe Attestation:   Scribe #1: I performed the above scribed service and the documentation accurately describes the services I performed. I attest to the accuracy of the note.    Attending Attestation:           Physician Attestation for Scribe:  Physician Attestation  Statement for Scribe #1: I, Mak Ruiz MD, reviewed documentation, as scribed by Radha Davenport in my presence, and it is both accurate and complete.             ED Course as of 12/01/23 1317   Fri Dec 01, 2023   1114 Sodium(!): 130 [LF]   1126 Paged [facility]. [BP]   1135 Patient called his cardiologist's this morning.  [facility] recommended he come to the ED to get evaluated.  Symptoms have been present for about 2 weeks worse over last 6 days and even more intense last night.  Seen at an outside emergency department twice Saturday and Monday negative troponin both times troponin is negative again today EKGs not acutely ischemic.  Since this is now his 3rd emergency department visit we will admit him with consults to Cardiology and Gastroenterology he is being followed as an outpatient by GI who had plans to do an EGD in the near future.  Discussed case with [facility] [LF]   1137 Paged hospitalist.  [BP]      ED Course User Index  [BP] Radha Davenport  [LF] Mak Ruiz MD                           Clinical Impression:  Final diagnoses:  [R07.9] Chest pain  [R07.9] Acute chest pain (Primary)  [R06.09] RIZVI (dyspnea on exertion)          ED Disposition Condition    Admit Stable                Mak Ruiz MD  12/01/23 1317

## 2023-12-01 NOTE — H&P
[facility] Hospital Medicine History & Physical Examination       Patient Name: Mikel Jewell  MRN: [id number]  Patient Class: IP- Inpatient   Admission Date: 12/01/2023   Admitting Service: Hospital Medicine   Length of Stay: 0  Attending Physician: Sarah Rosas MD  Primary Care Provider: David Kim Jr., MD  Face-to-Face encounter date: 12/01/2023  Code Status: Full  Chief Complaint: Chest Pain (Reports left sided chest pain/dizziness/SOB since Saturday. Was seen in the [place] ER on Saturday/Monday and sent home. Hx stent placement not on thinners. )      Patient information was obtained from patient, patient's family, past medical records and ER records.      HISTORY OF PRESENT ILLNESS:   Mikel Jewell is a 83 y.o. male with a PMHx of HTN, HLD, CAD s/p PCI, bilateral VITA, GERD, bladder cancer s/p radiation with recurrence s/p robotic cystectomy, prostatectomy and creation of ileal conduit, hiatal hernia who presented to [facility] on 12/1/2023 with c/o left-sided chest pain with associated dizziness and SOB times 1 week.    Initial ED VS include /79, HR 62, RR 21, SpO2 98% on room air, temperature 97.7° F. Labs notable for hemoglobin 12.6, hematocrit 34.7, sodium 130, chloride 97, creatinine 1.31.  Troponin undetectable.  EKG demonstrated sinus rhythm.  CXR negative.  Cardiology consulted in ED. Admitted to hospital medicine services for further medical management.    REVIEW OF SYSTEMS:   Except as documented, all other systems reviewed and negative     PAST MEDICAL HISTORY:   Essential hypertension  Hyperlipidemia  CAD s/p PCI  Bilateral carotid artery stenosis  GERD   Bladder cancer s/p Radiation with recurrence s/p robotic cystectomy, prostatectomy and creation of ileal conduit  Hiatal hernia    PAST SURGICAL HISTORY:     Past Surgical History:   Procedure Laterality Date    CHOLECYSTECTOMY      COLONOSCOPY      CORONARY ANGIOPLASTY WITH STENT PLACEMENT       ESOPHAGOGASTRODUODENOSCOPY      HERNIA REPAIR Bilateral     NEPHRECTOMY      PENILE PROSTHESIS IMPLANT      PROSTATE SURGERY  10/2022    Removal with bladder removal    Removal of bladder tumors      ROBOT-ASSISTED LAPAROSCOPIC CYSTECTOMY N/A 10/11/2022    Procedure: ROBOTIC CYSTECTOMY;  Surgeon: Flip Casas MD;  Location: [facility] OR;  Service: Urology;  Laterality: N/A;  ROBOT ASSISTED CYSTECTOMY WITH URINARY DIVERSION //   ANY INDICATED PROCEDURES    ROBOT-ASSISTED LAPAROSCOPIC PROSTATECTOMY USING DA RASHEED XI  10/11/2022    Procedure: XI ROBOTIC PROSTATECTOMY;  Surgeon: Flip Casas MD;  Location: [facility] OR;  Service: Urology;;    SMALL INTESTINE SURGERY  10/2022    Ileistomy    TURBT, WITH BLUE LIGHT CYSTOSCOPY AND CYSVIEW      urostomy         FAMILY HISTORY:   Father: CAD, Cancer  Mother: Depression, DM    SOCIAL HISTORY:   Denied alcohol, tobacco or illicit drug use. Former Smoker.    ALLERGIES:   Patient has no known allergies.    HOME MEDICATIONS:     Prior to Admission medications    Medication Sig Start Date End Date Taking? Authorizing Provider   atorvastatin (LIPITOR) 40 MG tablet Take 40 mg by mouth. 11/22/22  Yes Provider, Historical   losartan (COZAAR) 50 MG tablet Take 50 mg by mouth once daily.   Yes Provider, Historical   omeprazole (PRILOSEC) 20 MG capsule Take 20 mg by mouth once daily.   Yes Provider, Historical   losartan-hydrochlorothiazide 50-12.5 mg (HYZAAR) 50-12.5 mg per tablet Take 1 tablet by mouth once daily.    Provider, Historical     ________________________________________________________________________  INPATIENT LIST OF MEDICATIONS     Current Facility-Administered Medications:     acetaminophen tablet 650 mg, 650 mg, Oral, Q8H PRN, Miley Murphy AGACNP-BC    acetaminophen tablet 650 mg, 650 mg, Oral, Q4H PRN, Miley Murphy AGACNP-BC    atorvastatin tablet 40 mg, 40 mg, Oral, QHS, Paula, Norma, NP    losartan tablet 50 mg, 50 mg, Oral, Daily, Paula, Norma, NP, 50  mg at 12/01/23 1354    ondansetron injection 4 mg, 4 mg, Intravenous, Q8H PRN, Miley Murphy, [facility]    Current Outpatient Medications:     atorvastatin (LIPITOR) 40 MG tablet, Take 40 mg by mouth., Disp: , Rfl:     losartan (COZAAR) 50 MG tablet, Take 50 mg by mouth once daily., Disp: , Rfl:     omeprazole (PRILOSEC) 20 MG capsule, Take 20 mg by mouth once daily., Disp: , Rfl:     losartan-hydrochlorothiazide 50-12.5 mg (HYZAAR) 50-12.5 mg per tablet, Take 1 tablet by mouth once daily., Disp: , Rfl:     Scheduled Meds:   atorvastatin  40 mg Oral QHS    losartan  50 mg Oral Daily     Continuous Infusions:  PRN Meds:.acetaminophen, acetaminophen, ondansetron    PHYSICAL EXAM:     VITAL SIGNS: 24 HRS MIN & MAX LAST   Temp  Min: 97.7 °F (36.5 °C)  Max: 97.7 °F (36.5 °C) 97.7 °F (36.5 °C)   BP  Min: 135/72  Max: 160/75 (!) 145/73   Pulse  Min: 53  Max: 62  (!) 53   Resp  Min: 15  Max: 21 19   SpO2  Min: 96 %  Max: 100 % 98 %       General appearance: Well-developed male in no apparent distress.  HENT: Atraumatic head. Moist mucous membranes of oral cavity.  Eyes: Normal extraocular movements.   Neck: Supple.   Lungs: Clear to auscultation bilaterally.   Heart: Regular rate and rhythm. S1 and S2 present. No pedal edema.  Abdomen: Soft, non-distended, non-tender.  Extremities: No cyanosis, clubbing  Skin: No Rash.   Neuro: Motor and sensory exams grossly intact.   Psych/mental status: Appropriate mood and affect. Responds appropriately to questions.     LABS AND IMAGING:     Recent Labs   Lab 11/27/23  1015 11/28/23  1613 12/01/23  0926   WBC 5.30 4.72 5.31   RBC 3.98* 4.01* 3.84*   HGB 12.6* 12.5* 12.6*   HCT 37.6* 37.4* 34.7*   MCV 94.5* 93.3 90.4   MCH 31.7* 31.2* 32.8*   MCHC 33.5 33.4 36.3*   RDW 12.0 12.0 12.0    157 147   MPV 10.9* 10.4 10.9*       Recent Labs   Lab 11/27/23  1015 11/28/23  1613 12/01/23  0926   * 133* 130*   K 4.5 4.9 4.5   CO2 27 28 27   BUN 21.8 18.6 18.3   CREATININE 1.53*  1.37* 1.31*   CALCIUM 9.0 9.2 8.9   ALBUMIN 4.3 4.4 4.1   ALKPHOS 126 108 106   ALT 40 38 27   AST 38* 32 23   BILITOT 1.2 1.6* 1.1       Microbiology Results (last 7 days)       ** No results found for the last 168 hours. **             X-Ray Chest PA And Lateral  Narrative: EXAMINATION:  XR CHEST PA AND LATERAL    CLINICAL HISTORY:  chest pain;    TECHNIQUE:  Two-view    COMPARISON:  November 27, 2023.    FINDINGS:  Cardiopericardial silhouette is within normal limits. Lungs are without dense focal or segmental consolidation, congestion, pleural effusion or pneumothorax.  Impression: No acute cardiopulmonary process identified.    Electronically signed by: Rip Cullen  Date:    12/01/2023  Time:    10:03        ASSESSMENT & PLAN:     Atypical chest pain  CAD s/p PCI   Essential hypertension  Hyperlipidemia  Bilateral carotid artery stenosis   GERD   Hx of Bladder cancer s/p Radiation with recurrence s/p robotic cystectomy, prostatectomy and creation of ileal conduit, Hiatal hernia    Plan:  Cardiology consult, appreciate assistance and recommendations   Echo pending  NPO with plans for [facility] today   Resume appropriate home medications once updated   Labs in a.m.    VTE Prophylaxis: SCDs    Discharge Planning and Disposition: TBD    I, Miley Murphy, NP have reviewed and discussed the case with Sarah Rosas MD  Please see the attending MD's addendum for further assessment and plan.    Miley Murphy, AGAP-BC  Department of Hospital Medicine   [facility]   12/01/2023    _______________________________________________________________________________  MD Addendum:  I, Dr. Dr. Rosas assumed care of this patient today  For the patient encounter, I performed the substantive portion of the visit, I reviewed the NP/PA documentation, treatment plan, and medical decision making.      History:  83-year-old male with past medical history stated as above presenting for left-sided chest pain  , shortness for breath and dizziness.    Spoke with the wife at bedside, patient went to the procedure, expected time back to the floors would probably be around 7 pm, unsure      Medical decision making:  Chest pain  CAD s/p PCI  Hyponatremia    Essential hypertension, hyperlipidemia, bilateral carotid artery stenosis, GERD, history of bladder cancer, hiatal hernia    Plan :  Chest x-ray-with no acute abnormality.  D-dimer-less than 0.27  Cardiology was consulted, troponin and EKG reviewed, recommended to follow up on echocardiogram and also recommending to keep NPO ;planning for LHC  Loaded with aspirin, restarted on statin, with bradycardia-not on beta-blocker  Closely monitor sodium, check urine sodium and urine and serum osmolality  Continue to monitor blood pressure, restarted home losartan; we will add p.r.n. antihypertensives  Continue supportive care, appropriate home medications.  Follow up on electrolytes    DVT prophylaxis-SCDs    Addendum:Spoke with the patient after returning from procedure, agree with Physical exam as above in NP's note.Full code    All diagnosis and differential diagnosis have been reviewed; assessment and plan has been documented; I have personally reviewed the labs and test results that are presently available; I have reviewed the patients medication list; I have reviewed the consulting providers response and recommendations. I have reviewed or attempted to review medical records based upon their availability.    All of the patient and family questions have been addressed and answered. Patient's is agreeable to the above stated plan. I will continue to monitor closely and make adjustments to medical management as needed.      12/01/2023

## 2023-12-01 NOTE — Clinical Note
The catheter was inserted into the ostium   left main. An angiography was performed of the left coronary arteries. Multiple views were taken. The angiography was performed via power injection.  JL

## 2023-12-01 NOTE — CONSULTS
"[facility] - Emergency Dept    Cardiology  Consult Note    Patient Name: Mikel Jewell  MRN: [id number]  Admission Date: 12/1/2023  Hospital Length of Stay: 0 days  Code Status: No Order   Attending Provider: Mak Ruiz MD   Consulting Provider: Norma Mitchell NP  Primary Care Physician: David Kim Jr., MD  Principal Problem:<principal problem not specified>    Patient information was obtained from patient, past medical records, and ER records.     Subjective:     Reason for Consult: Chest Pain     HPI: 83-year-old male that is known to [facility]/Dr. Mayo with a PMHx of CAD s/p PCI, BCAS, HLD, HTN & GERD. He reports to [facility] ED with complaints of Left sided chest pain with associated dizziness and SOB. He reports this pain is similar to his previous anginal pain. The patient reports the pain was at its worst last night and he "felt like he was going to die". He reports these symptoms have presisted for the last 2 weeks.   Of note, the patient presented to the ED in [place] on 11.25.23 & 11.27.23 for the same complaints. On both ED visits his Troponin were negative and EKG showed no acute ischemic changes.  Of note he reports GI is following him for a hital hernia and they have plans for an EGD soon. ED course: VS on admit HR 62, /79, RR 21, O2 sat 98% on RA. Lab work: WBC 5.31, H/H 12.6/34.7, , Na 130, K 4.5, BUN/Cr 18.3/1.31, Ca 8.9, AST/ALT 23/27, Trop <0.010, EKG shows no acute ST changes. CXR no acute cardiopulmomary process noted. [facility] was consult for recurrent Chest pain.    PMH: CAD s/p PCI, BCAS, HLD, HTN, GERD, bladder cancer, renal cancer, hital hernia   PSH: Cholecystectomy, colonoscopy, LHC with PCI (2017), hernia repai, nephrectomy, Penile implant, prostate surgery, Removable of bladder tumors, ileostomy, urostomy   Family History: Father: CAD, Cancer; Mother: depression, DM  Social History:     Previous Cardiac Diagnostics:   Carotid US 4.4.22:  The " study quality is average.   1-39% stenosis in the proximal right internal carotid artery based on Bluth Criteria.   1-39% stenosis in the proximal left internal carotid artery based on Bluth Criteria.   Antegrade right vertebral artery flow.   Antegrade left vertebral artery flow.     [facility] 4.17.17:  LM: Patent with luminal regularities   LAD: Calcified type II vessel with 60-70% stenosis of the proximal to mid segment. FFR 0.88. multiple small patent diagonal branches. There is a previous stent on distal LAD proximal subsection  LCx: Very large patent codominate vessel  RCA: Small nondominant patent vessel     [facility] 3.30.17:  LM - patent vessel  LAD - large type II vessel with diffuse 40-50% proximal stenosis. The distal LAD hazy 75% stenosis --> 50% s/p PCI   D1 - 50-60% stenosis   LCx - Large dominate vessel with diffuse luminal regularities   RCA - medium to large size nondominant vessel with diffuse luminal regularities     ECHO 10.8.19:  The study quality is average.   The left ventricle is normal in size. Global left ventricular systolic function is normal. The left ventricular ejection fraction is 60%. Left ventricular diastolic function is indeterminate.   Dilatation of the aortic root (4.0 cms) and ascending aorta (3.8 cms) is noted.   Mild (1+) mitral regurgitation.   The pulmonary artery systolic pressure is 31 mmHg.       Review of patient's allergies indicates:  No Known Allergies    No current facility-administered medications on file prior to encounter.     Current Outpatient Medications on File Prior to Encounter   Medication Sig    atorvastatin (LIPITOR) 40 MG tablet Take 40 mg by mouth.    losartan-hydrochlorothiazide 50-12.5 mg (HYZAAR) 50-12.5 mg per tablet Take 1 tablet by mouth once daily.    omeprazole (PRILOSEC) 20 MG capsule Take 20 mg by mouth once daily.     Family History       Problem Relation (Age of Onset)    Cancer Brother    Depression Mother    Diabetes Mother          Tobacco Use     Smoking status: Former     Current packs/day: 1.50     Average packs/day: 1.5 packs/day for 15.0 years (22.5 ttl pk-yrs)     Types: Cigarettes    Smokeless tobacco: Never    Tobacco comments:     Dont remember dates. - appx dates 7971-2725   Substance and Sexual Activity    Alcohol use: Never    Drug use: Never    Sexual activity: Yes     Partners: Female     Birth control/protection: Post-menopausal       Review of Systems   Constitutional:  Positive for fatigue. Negative for chills and fever.   Respiratory:  Positive for chest tightness and shortness of breath.    Cardiovascular:  Positive for chest pain. Negative for palpitations.   Gastrointestinal:  Negative for abdominal pain, nausea and vomiting.   Genitourinary: Negative.    Neurological: Negative.    Hematological: Negative.    Psychiatric/Behavioral: Negative.         Objective:     Vital Signs (Most Recent):  Temp: 97.7 °F (36.5 °C) (12/01/23 0909)  Pulse: (!) 56 (12/01/23 1029)  Resp: (!) 21 (12/01/23 1044)  BP: (!) 146/74 (12/01/23 1029)  SpO2: 100 % (12/01/23 1029) Vital Signs (24h Range):  Temp:  [97.7 °F (36.5 °C)] 97.7 °F (36.5 °C)  Pulse:  [54-62] 56  Resp:  [15-21] 21  SpO2:  [98 %-100 %] 100 %  BP: (135-150)/(72-80) 146/74        There is no height or weight on file to calculate BMI.    SpO2: 100 %       No intake or output data in the 24 hours ending 12/01/23 1138    Lines/Drains/Airways       Drain  Duration                  Urostomy 10/11/22 ureterostomy, right  days              Peripheral Intravenous Line  Duration                  Peripheral IV - Single Lumen 12/01/23 0929 20 G Right Forearm <1 day                    Significant Labs:  Recent Results (from the past 72 hour(s))   Hepatic Function Panel    Collection Time: 11/28/23  4:13 PM   Result Value Ref Range    Albumin Level 4.4 3.4 - 4.8 g/dL    Alkaline Phosphatase 108 40 - 150 unit/L    Alanine Aminotransferase 38 0 - 55 unit/L    Aspartate Aminotransferase 32 5 - 34 unit/L     Bilirubin Direct 0.6 (H) 0.0 - <0.5 mg/dL    Bilirubin Indirect 1.00 (H) 0.00 - 0.80 mg/dL    Bilirubin Total 1.6 (H) <=1.5 mg/dL    Protein Total 7.2 5.8 - 7.6 gm/dL   T7    Collection Time: 11/28/23  4:13 PM   Result Value Ref Range    T3 Uptake 36.90 31.00 - 39.00 %    Thyroxine 9.06 4.87 - 11.72 ug/dL    Free Thyroxine Index 3.34 2.60 - 3.60   PSA, Screening    Collection Time: 11/28/23  4:13 PM   Result Value Ref Range    Prostate Specific Antigen <0.10 <=4.00 ng/mL   Lipid Panel    Collection Time: 11/28/23  4:13 PM   Result Value Ref Range    Cholesterol Total 106 <=200 mg/dL    HDL Cholesterol 43 35 - 60 mg/dL    Triglyceride 73 34 - 140 mg/dL    Cholesterol/HDL Ratio 2 0 - 5    Very Low Density Lipoprotein 15     LDL Cholesterol 48.00 (L) 50.00 - 140.00 mg/dL   Basic Metabolic Panel    Collection Time: 11/28/23  4:13 PM   Result Value Ref Range    Sodium Level 133 (L) 136 - 145 mmol/L    Potassium Level 4.9 3.5 - 5.1 mmol/L    Chloride 97 (L) 98 - 107 mmol/L    Carbon Dioxide 28 23 - 31 mmol/L    Glucose Level 96 82 - 115 mg/dL    Blood Urea Nitrogen 18.6 8.4 - 25.7 mg/dL    Creatinine 1.37 (H) 0.73 - 1.18 mg/dL    BUN/Creatinine Ratio 14     Calcium Level Total 9.2 8.8 - 10.0 mg/dL    Anion Gap 8.0 mEq/L    eGFR 52 mls/min/1.73/m2   TSH    Collection Time: 11/28/23  4:13 PM   Result Value Ref Range    TSH 1.923 0.350 - 4.940 uIU/mL   CBC with Differential    Collection Time: 11/28/23  4:13 PM   Result Value Ref Range    WBC 4.72 4.50 - 11.50 x10(3)/mcL    RBC 4.01 (L) 4.70 - 6.10 x10(6)/mcL    Hgb 12.5 (L) 14.0 - 18.0 g/dL    Hct 37.4 (L) 42.0 - 52.0 %    MCV 93.3 80.0 - 94.0 fL    MCH 31.2 (H) 27.0 - 31.0 pg    MCHC 33.4 33.0 - 36.0 g/dL    RDW 12.0 11.5 - 17.0 %    Platelet 157 130 - 400 x10(3)/mcL    MPV 10.4 7.4 - 10.4 fL    Neut % 69.3 %    Lymph % 21.0 %    Mono % 7.6 %    Eos % 1.5 %    Basophil % 0.4 %    Lymph # 0.99 0.6 - 4.6 x10(3)/mcL    Neut # 3.27 2.1 - 9.2 x10(3)/mcL    Mono # 0.36 0.1 -  1.3 x10(3)/mcL    Eos # 0.07 0 - 0.9 x10(3)/mcL    Baso # 0.02 <=0.2 x10(3)/mcL    IG# 0.01 0 - 0.04 x10(3)/mcL    IG% 0.2 %   Iron and TIBC    Collection Time: 11/28/23  4:27 PM   Result Value Ref Range    Iron Binding Capacity Unsaturated 179 69 - 240 ug/dL    Iron Level 113 65 - 175 ug/dL    Transferrin 270 mg/dL    Iron Binding Capacity Total 292 250 - 450 ug/dL    Iron Saturation 39 20 - 50 %   Vitamin B12    Collection Time: 11/28/23  4:27 PM   Result Value Ref Range    Vitamin B12 Level 510 213 - 816 pg/mL   Folate    Collection Time: 11/28/23  4:27 PM   Result Value Ref Range    Folate Level 16.7 7.0 - 31.4 ng/mL   Ferritin    Collection Time: 11/28/23  4:27 PM   Result Value Ref Range    Ferritin Level 99.60 21.81 - 274.66 ng/mL   Comprehensive Metabolic Panel    Collection Time: 12/01/23  9:26 AM   Result Value Ref Range    Sodium Level 130 (L) 136 - 145 mmol/L    Potassium Level 4.5 3.5 - 5.1 mmol/L    Chloride 97 (L) 98 - 107 mmol/L    Carbon Dioxide 27 23 - 31 mmol/L    Glucose Level 98 82 - 115 mg/dL    Blood Urea Nitrogen 18.3 8.4 - 25.7 mg/dL    Creatinine 1.31 (H) 0.73 - 1.18 mg/dL    Calcium Level Total 8.9 8.8 - 10.0 mg/dL    Protein Total 6.6 5.8 - 7.6 gm/dL    Albumin Level 4.1 3.4 - 4.8 g/dL    Globulin 2.5 2.4 - 3.5 gm/dL    Albumin/Globulin Ratio 1.6 1.1 - 2.0 ratio    Bilirubin Total 1.1 <=1.5 mg/dL    Alkaline Phosphatase 106 40 - 150 unit/L    Alanine Aminotransferase 27 0 - 55 unit/L    Aspartate Aminotransferase 23 5 - 34 unit/L    eGFR 54 mls/min/1.73/m2   Troponin I    Collection Time: 12/01/23  9:26 AM   Result Value Ref Range    Troponin-I <0.010 0.000 - 0.045 ng/mL   CBC with Differential    Collection Time: 12/01/23  9:26 AM   Result Value Ref Range    WBC 5.31 4.50 - 11.50 x10(3)/mcL    RBC 3.84 (L) 4.70 - 6.10 x10(6)/mcL    Hgb 12.6 (L) 14.0 - 18.0 g/dL    Hct 34.7 (L) 42.0 - 52.0 %    MCV 90.4 80.0 - 94.0 fL    MCH 32.8 (H) 27.0 - 31.0 pg    MCHC 36.3 (H) 33.0 - 36.0 g/dL    RDW  12.0 11.5 - 17.0 %    Platelet 147 130 - 400 x10(3)/mcL    MPV 10.9 (H) 7.4 - 10.4 fL    Neut % 72.8 %    Lymph % 17.3 %    Mono % 6.8 %    Eos % 2.1 %    Basophil % 0.6 %    Lymph # 0.92 0.6 - 4.6 x10(3)/mcL    Neut # 3.87 2.1 - 9.2 x10(3)/mcL    Mono # 0.36 0.1 - 1.3 x10(3)/mcL    Eos # 0.11 0 - 0.9 x10(3)/mcL    Baso # 0.03 <=0.2 x10(3)/mcL    IG# 0.02 0 - 0.04 x10(3)/mcL    IG% 0.4 %    NRBC% 0.0 %       Significant Imaging:  Imaging Results              X-Ray Chest PA And Lateral (Final result)  Result time 12/01/23 10:03:10      Final result by Rip Cullen MD (12/01/23 10:03:10)                   Impression:      No acute cardiopulmonary process identified.      Electronically signed by: Rip Cullen  Date:    12/01/2023  Time:    10:03               Narrative:    EXAMINATION:  XR CHEST PA AND LATERAL    CLINICAL HISTORY:  chest pain;    TECHNIQUE:  Two-view    COMPARISON:  November 27, 2023.    FINDINGS:  Cardiopericardial silhouette is within normal limits. Lungs are without dense focal or segmental consolidation, congestion, pleural effusion or pneumothorax.                                      EKG:        Telemetry:  SR    Physical Exam  HENT:      Head: Normocephalic.      Nose: Nose normal.      Mouth/Throat:      Mouth: Mucous membranes are moist.   Cardiovascular:      Rate and Rhythm: Normal rate and regular rhythm.      Pulses: Normal pulses.      Heart sounds: Normal heart sounds. No murmur heard.  Pulmonary:      Effort: Pulmonary effort is normal. No respiratory distress.      Breath sounds: Normal breath sounds.   Abdominal:      General: Abdomen is flat. There is no distension.      Palpations: Abdomen is soft.   Skin:     General: Skin is warm.   Neurological:      Mental Status: He is alert and oriented to person, place, and time.   Psychiatric:         Mood and Affect: Mood normal.         Behavior: Behavior normal.         Judgment: Judgment normal.         Home Medications:   No  current facility-administered medications on file prior to encounter.     Current Outpatient Medications on File Prior to Encounter   Medication Sig Dispense Refill    atorvastatin (LIPITOR) 40 MG tablet Take 40 mg by mouth.      losartan-hydrochlorothiazide 50-12.5 mg (HYZAAR) 50-12.5 mg per tablet Take 1 tablet by mouth once daily.      omeprazole (PRILOSEC) 20 MG capsule Take 20 mg by mouth once daily.         Current Inpatient Medications:  No current facility-administered medications for this encounter.    Current Outpatient Medications:     atorvastatin (LIPITOR) 40 MG tablet, Take 40 mg by mouth., Disp: , Rfl:     losartan-hydrochlorothiazide 50-12.5 mg (HYZAAR) 50-12.5 mg per tablet, Take 1 tablet by mouth once daily., Disp: , Rfl:     omeprazole (PRILOSEC) 20 MG capsule, Take 20 mg by mouth once daily., Disp: , Rfl:          VTE Risk Mitigation (From admission, onward)      None            Assessment:   Chest Pain   - Trop <0.010   - EKG shows no acute ST changes   - Recurrent CP ~ 3rd ED presentation in the last week with the velma symptoms   CAD   - LHC (4.17.17) ~ LM: Patent, , LAD: Calcified type II vessel with 60-70% stenosis of the proximal to mid segment. FFR 0.88. multiple small patent diagonal branches. Previous stent on distal LAD proximal subsection, LCx: Very large patent codominate vessel, RCA: Small nondominant patent vessel   HLD  BCAS   - Carotid US 4.4.22 ~ REAL & LICA 1-39% stenosis   HLD  HTN  GERD  Bladder Cancer  Renal Cancer  Hital Hernia   No Hx of GI bleeding       Plan:   EKG & biomarkers reviewed  Obtain ECHO  Obtain D-dimer  Loaded with ASA  Will restart home statin  Restarted home antihypertensive medications  Keep NPO will plan of LHC today   Risk and benefits discussed ~ patient agreeable to proceed - consent obtained       Thank you for your consult.     Norma Mitchell NP  Cardiology  [facility] - Emergency Dept  12/01/2023 11:38 AM      I have seen the patient,  reviewed the Nurse Practitioner's note, assessment and plan. I have personally interviewed and examined the patient at bedside and agree with the findings. Medical decision making listed above were done under my guidance.

## 2023-12-02 VITALS
TEMPERATURE: 98 F | HEART RATE: 63 BPM | SYSTOLIC BLOOD PRESSURE: 149 MMHG | WEIGHT: 184.31 LBS | BODY MASS INDEX: 25.8 KG/M2 | DIASTOLIC BLOOD PRESSURE: 74 MMHG | HEIGHT: 71 IN | RESPIRATION RATE: 16 BRPM | OXYGEN SATURATION: 98 %

## 2023-12-02 PROBLEM — I25.10 CAD (CORONARY ARTERY DISEASE): Status: ACTIVE | Noted: 2023-12-02

## 2023-12-02 LAB
ALBUMIN SERPL-MCNC: 3.2 G/DL (ref 3.4–4.8)
ALBUMIN/GLOB SERPL: 1.3 RATIO (ref 1.1–2)
ALP SERPL-CCNC: 81 UNIT/L (ref 40–150)
ALT SERPL-CCNC: 23 UNIT/L (ref 0–55)
AST SERPL-CCNC: 20 UNIT/L (ref 5–34)
AV INDEX (PROSTH): 0.45
AV MEAN GRADIENT: 3 MMHG
AV PEAK GRADIENT: 7 MMHG
AV VELOCITY RATIO: 0.46
BASOPHILS # BLD AUTO: 0.02 X10(3)/MCL
BASOPHILS NFR BLD AUTO: 0.5 %
BILIRUB SERPL-MCNC: 0.9 MG/DL
BUN SERPL-MCNC: 15.5 MG/DL (ref 8.4–25.7)
CALCIUM SERPL-MCNC: 8.3 MG/DL (ref 8.8–10)
CHLORIDE SERPL-SCNC: 107 MMOL/L (ref 98–107)
CO2 SERPL-SCNC: 21 MMOL/L (ref 23–31)
CREAT SERPL-MCNC: 1.23 MG/DL (ref 0.73–1.18)
CV ECHO LV RWT: 0.36 CM
DOP CALC AO PEAK VEL: 1.28 M/S
DOP CALC AO VTI: 30.9 CM
DOP CALC LVOT PEAK VEL: 0.59 M/S
DOP CALCLVOT PEAK VEL VTI: 13.8 CM
E WAVE DECELERATION TIME: 235 MSEC
E/A RATIO: 0.72
E/E' RATIO: 9.86 M/S
ECHO LV POSTERIOR WALL: 0.91 CM (ref 0.6–1.1)
EOSINOPHIL # BLD AUTO: 0.1 X10(3)/MCL (ref 0–0.9)
EOSINOPHIL NFR BLD AUTO: 2.4 %
ERYTHROCYTE [DISTWIDTH] IN BLOOD BY AUTOMATED COUNT: 12.1 % (ref 11.5–17)
FRACTIONAL SHORTENING: 30 % (ref 28–44)
GFR SERPLBLD CREATININE-BSD FMLA CKD-EPI: 59 MLS/MIN/1.73/M2
GLOBULIN SER-MCNC: 2.4 GM/DL (ref 2.4–3.5)
GLUCOSE SERPL-MCNC: 108 MG/DL (ref 82–115)
HCT VFR BLD AUTO: 31 % (ref 42–52)
HGB BLD-MCNC: 11 G/DL (ref 14–18)
IMM GRANULOCYTES # BLD AUTO: 0.01 X10(3)/MCL (ref 0–0.04)
IMM GRANULOCYTES NFR BLD AUTO: 0.2 %
INTERVENTRICULAR SEPTUM: 1.05 CM (ref 0.6–1.1)
LEFT ATRIUM SIZE: 2.8 CM
LEFT INTERNAL DIMENSION IN SYSTOLE: 3.5 CM (ref 2.1–4)
LEFT VENTRICLE DIASTOLIC VOLUME: 119 ML
LEFT VENTRICLE SYSTOLIC VOLUME: 50.9 ML
LEFT VENTRICULAR INTERNAL DIMENSION IN DIASTOLE: 5.01 CM (ref 3.5–6)
LEFT VENTRICULAR MASS: 177.7 G
LV LATERAL E/E' RATIO: 8.63 M/S
LV SEPTAL E/E' RATIO: 11.5 M/S
LVOT MG: 1 MMHG
LVOT MV: 0.38 CM/S
LYMPHOCYTES # BLD AUTO: 0.88 X10(3)/MCL (ref 0.6–4.6)
LYMPHOCYTES NFR BLD AUTO: 20.8 %
MCH RBC QN AUTO: 32.3 PG (ref 27–31)
MCHC RBC AUTO-ENTMCNC: 35.5 G/DL (ref 33–36)
MCV RBC AUTO: 90.9 FL (ref 80–94)
MONOCYTES # BLD AUTO: 0.39 X10(3)/MCL (ref 0.1–1.3)
MONOCYTES NFR BLD AUTO: 9.2 %
MV PEAK A VEL: 0.96 M/S
MV PEAK E VEL: 0.69 M/S
NEUTROPHILS # BLD AUTO: 2.84 X10(3)/MCL (ref 2.1–9.2)
NEUTROPHILS NFR BLD AUTO: 66.9 %
NRBC BLD AUTO-RTO: 0 %
PLATELET # BLD AUTO: 129 X10(3)/MCL (ref 130–400)
PLATELETS.RETICULATED NFR BLD AUTO: 5.3 % (ref 0.9–11.2)
PMV BLD AUTO: 11 FL (ref 7.4–10.4)
POTASSIUM SERPL-SCNC: 4.5 MMOL/L (ref 3.5–5.1)
PROT SERPL-MCNC: 5.6 GM/DL (ref 5.8–7.6)
RA PRESSURE ESTIMATED: 3 MMHG
RBC # BLD AUTO: 3.41 X10(6)/MCL (ref 4.7–6.1)
SODIUM SERPL-SCNC: 133 MMOL/L (ref 136–145)
SODIUM UR-SCNC: 42 MMOL/L
TDI LATERAL: 0.08 M/S
TDI SEPTAL: 0.06 M/S
TDI: 0.07 M/S
WBC # SPEC AUTO: 4.24 X10(3)/MCL (ref 4.5–11.5)

## 2023-12-02 PROCEDURE — 80053 COMPREHEN METABOLIC PANEL: CPT | Performed by: NURSE PRACTITIONER

## 2023-12-02 PROCEDURE — 25000003 PHARM REV CODE 250: Performed by: INTERNAL MEDICINE

## 2023-12-02 PROCEDURE — 93005 ELECTROCARDIOGRAM TRACING: CPT

## 2023-12-02 PROCEDURE — 85025 COMPLETE CBC W/AUTO DIFF WBC: CPT | Performed by: NURSE PRACTITIONER

## 2023-12-02 PROCEDURE — 25000003 PHARM REV CODE 250: Performed by: NURSE PRACTITIONER

## 2023-12-02 PROCEDURE — 93010 EKG 12-LEAD: ICD-10-PCS | Mod: ,,, | Performed by: INTERNAL MEDICINE

## 2023-12-02 PROCEDURE — 93010 ELECTROCARDIOGRAM REPORT: CPT | Mod: ,,, | Performed by: INTERNAL MEDICINE

## 2023-12-02 RX ORDER — POLYETHYLENE GLYCOL 3350 17 G/17G
17 POWDER, FOR SOLUTION ORAL 2 TIMES DAILY
Status: DISCONTINUED | OUTPATIENT
Start: 2023-12-02 | End: 2023-12-02 | Stop reason: HOSPADM

## 2023-12-02 RX ORDER — CLOPIDOGREL BISULFATE 75 MG/1
75 TABLET ORAL DAILY
Qty: 90 TABLET | Refills: 0 | Status: SHIPPED | OUTPATIENT
Start: 2023-12-03 | End: 2024-03-02

## 2023-12-02 RX ORDER — PANTOPRAZOLE SODIUM 40 MG/1
40 TABLET, DELAYED RELEASE ORAL DAILY
Qty: 30 TABLET | Refills: 11 | Status: SHIPPED | OUTPATIENT
Start: 2023-12-02 | End: 2024-12-01

## 2023-12-02 RX ORDER — ASPIRIN 81 MG/1
81 TABLET ORAL DAILY
Qty: 90 TABLET | Refills: 0 | Status: SHIPPED | OUTPATIENT
Start: 2023-12-03 | End: 2024-03-02

## 2023-12-02 RX ORDER — POLYETHYLENE GLYCOL 3350 17 G/17G
17 POWDER, FOR SOLUTION ORAL 2 TIMES DAILY PRN
Qty: 60 PACKET | Refills: 0 | Status: SHIPPED | OUTPATIENT
Start: 2023-12-02

## 2023-12-02 RX ADMIN — SODIUM CHLORIDE: 9 INJECTION, SOLUTION INTRAVENOUS at 12:12

## 2023-12-02 RX ADMIN — ASPIRIN 81 MG: 81 TABLET, COATED ORAL at 08:12

## 2023-12-02 RX ADMIN — ALUMINUM HYDROXIDE, MAGNESIUM HYDROXIDE, AND SIMETHICONE 30 ML: 200; 200; 20 SUSPENSION ORAL at 05:12

## 2023-12-02 RX ADMIN — CLOPIDOGREL BISULFATE 75 MG: 75 TABLET ORAL at 08:12

## 2023-12-02 RX ADMIN — PANTOPRAZOLE SODIUM 40 MG: 40 TABLET, DELAYED RELEASE ORAL at 08:12

## 2023-12-02 RX ADMIN — LOSARTAN POTASSIUM 50 MG: 50 TABLET, FILM COATED ORAL at 08:12

## 2023-12-02 NOTE — PROGRESS NOTES
"[facility]    Cardiology  Progress Note    Patient Name: Mikel Jewell  MRN: [id number]  Admission Date: 12/1/2023  Hospital Length of Stay: 1 days  Code Status: Full Code   Attending Provider: Sarah Rosas MD   Consulting Provider: ANA Hou  Primary Care Physician: David Kim Jr., MD  Principal Problem:CAD (coronary artery disease)    Patient information was obtained from patient, past medical records, and ER records.     Subjective:     Reason for Consult: Chest Pain     HPI: 83-year-old male that is known to [facility]/Dr. Mayo with a PMHx of CAD s/p PCI, BCAS, HLD, HTN & GERD. He reports to [facility] ED with complaints of Left sided chest pain with associated dizziness and SOB. He reports this pain is similar to his previous anginal pain. The patient reports the pain was at its worst last night and he "felt like he was going to die". He reports these symptoms have presisted for the last 2 weeks.   Of note, the patient presented to the ED in [place] on 11.25.23 & 11.27.23 for the same complaints. On both ED visits his Troponin were negative and EKG showed no acute ischemic changes.  Of note he reports GI is following him for a hital hernia and they have plans for an EGD soon. ED course: VS on admit HR 62, /79, RR 21, O2 sat 98% on RA. Lab work: WBC 5.31, H/H 12.6/34.7, , Na 130, K 4.5, BUN/Cr 18.3/1.31, Ca 8.9, AST/ALT 23/27, Trop <0.010, EKG shows no acute ST changes. CXR no acute cardiopulmomary process noted. [facility] was consult for recurrent Chest pain.    12.2.23: NAD. "I am feeling great. I have no CP, SOB, Dizziness." Denies CP, SOB and Palps. S/p LHC with PCI.     PMH: CAD s/p PCI, BCAS, HLD, HTN, GERD, bladder cancer, renal cancer, hital hernia   PSH: Cholecystectomy, colonoscopy, LHC with PCI (2017), hernia repai, nephrectomy, Penile implant, prostate surgery, Removable of bladder tumors, ileostomy, urostomy   Family History: Father: CAD, Cancer; Mother: " depression, DM  Social History:     Previous Cardiac Diagnostics:   [facility] 12.1.23:  Findings:  Left main coronary artery:  Mild disease  Left anterior descending artery:  85% calcified proximal stenosis, 30% mid stenosis, patent distal stent  Left circumflex artery:  Dominant.  Focal 50% distal stenosis.  Right coronary artery:  Non dominant.  Diffuse mild-to-moderate disease  LVEDP:  6 mm Hg  No significant transvalvular aortic gradient by pullback method  Assessment:  -Successful IVUS guided PCI with shockwave lithotripsy of the proximal LAD  Plan:  -Dual anti-platelet therapy for minimum of 6-12 months.  Lifelong aspirin therapy.  -Optimization of CAD with guideline directed medical therapy    Carotid US 4.4.22:  The study quality is average.   1-39% stenosis in the proximal right internal carotid artery based on Bluth Criteria.   1-39% stenosis in the proximal left internal carotid artery based on Bluth Criteria.   Antegrade right vertebral artery flow.   Antegrade left vertebral artery flow.     [facility] 4.17.17:  LM: Patent with luminal regularities   LAD: Calcified type II vessel with 60-70% stenosis of the proximal to mid segment. FFR 0.88. multiple small patent diagonal branches. There is a previous stent on distal LAD proximal subsection  LCx: Very large patent codominate vessel  RCA: Small nondominant patent vessel     [facility] 3.30.17:  LM - patent vessel  LAD - large type II vessel with diffuse 40-50% proximal stenosis. The distal LAD hazy 75% stenosis --> 50% s/p PCI   D1 - 50-60% stenosis   LCx - Large dominate vessel with diffuse luminal regularities   RCA - medium to large size nondominant vessel with diffuse luminal regularities     ECHO 10.8.19:  The study quality is average.   The left ventricle is normal in size. Global left ventricular systolic function is normal. The left ventricular ejection fraction is 60%. Left ventricular diastolic function is indeterminate.   Dilatation of the aortic root (4.0 cms)  and ascending aorta (3.8 cms) is noted.   Mild (1+) mitral regurgitation.   The pulmonary artery systolic pressure is 31 mmHg.     Review of patient's allergies indicates:  No Known Allergies    No current facility-administered medications on file prior to encounter.     Current Outpatient Medications on File Prior to Encounter   Medication Sig    atorvastatin (LIPITOR) 40 MG tablet Take 40 mg by mouth.    losartan (COZAAR) 50 MG tablet Take 50 mg by mouth once daily.    omeprazole (PRILOSEC) 20 MG capsule Take 20 mg by mouth once daily.    sucralfate (CARAFATE) 1 gram tablet Take 1 g by mouth 4 (four) times daily.    losartan-hydrochlorothiazide 50-12.5 mg (HYZAAR) 50-12.5 mg per tablet Take 1 tablet by mouth once daily.     Review of Systems   Constitutional:  Negative for chills, fatigue and fever.   Respiratory:  Negative for chest tightness and shortness of breath.    Cardiovascular:  Negative for chest pain and palpitations.   All other systems reviewed and are negative.    Objective:     Vital Signs (Most Recent):  Temp: 98.2 °F (36.8 °C) (12/02/23 0750)  Pulse: 63 (12/02/23 0750)  Resp: 16 (12/02/23 0400)  BP: (!) 149/74 (12/02/23 0849)  SpO2: 98 % (12/02/23 0750) Vital Signs (24h Range):  Temp:  [97.6 °F (36.4 °C)-98.7 °F (37.1 °C)] 98.2 °F (36.8 °C)  Pulse:  [47-64] 63  Resp:  [15-21] 16  SpO2:  [96 %-100 %] 98 %  BP: (106-180)/() 149/74     Weight: 83.6 kg (184 lb 4.9 oz)  Body mass index is 25.71 kg/m².    SpO2: 98 %         Intake/Output Summary (Last 24 hours) at 12/2/2023 0921  Last data filed at 12/2/2023 0556  Gross per 24 hour   Intake --   Output 1850 ml   Net -1850 ml     Lines/Drains/Airways       Drain  Duration                  Urostomy 10/11/22 ureterostomy, right  days              Line  Duration                  Sheath 12/01/23 1708 Right anterior;proximal <1 day              Peripheral Intravenous Line  Duration                  Peripheral IV - Single Lumen 12/01/23 0929 20 G  Right Forearm <1 day                  Significant Labs:  Recent Results (from the past 72 hour(s))   Comprehensive Metabolic Panel    Collection Time: 12/01/23  9:26 AM   Result Value Ref Range    Sodium Level 130 (L) 136 - 145 mmol/L    Potassium Level 4.5 3.5 - 5.1 mmol/L    Chloride 97 (L) 98 - 107 mmol/L    Carbon Dioxide 27 23 - 31 mmol/L    Glucose Level 98 82 - 115 mg/dL    Blood Urea Nitrogen 18.3 8.4 - 25.7 mg/dL    Creatinine 1.31 (H) 0.73 - 1.18 mg/dL    Calcium Level Total 8.9 8.8 - 10.0 mg/dL    Protein Total 6.6 5.8 - 7.6 gm/dL    Albumin Level 4.1 3.4 - 4.8 g/dL    Globulin 2.5 2.4 - 3.5 gm/dL    Albumin/Globulin Ratio 1.6 1.1 - 2.0 ratio    Bilirubin Total 1.1 <=1.5 mg/dL    Alkaline Phosphatase 106 40 - 150 unit/L    Alanine Aminotransferase 27 0 - 55 unit/L    Aspartate Aminotransferase 23 5 - 34 unit/L    eGFR 54 mls/min/1.73/m2   Troponin I    Collection Time: 12/01/23  9:26 AM   Result Value Ref Range    Troponin-I <0.010 0.000 - 0.045 ng/mL   CBC with Differential    Collection Time: 12/01/23  9:26 AM   Result Value Ref Range    WBC 5.31 4.50 - 11.50 x10(3)/mcL    RBC 3.84 (L) 4.70 - 6.10 x10(6)/mcL    Hgb 12.6 (L) 14.0 - 18.0 g/dL    Hct 34.7 (L) 42.0 - 52.0 %    MCV 90.4 80.0 - 94.0 fL    MCH 32.8 (H) 27.0 - 31.0 pg    MCHC 36.3 (H) 33.0 - 36.0 g/dL    RDW 12.0 11.5 - 17.0 %    Platelet 147 130 - 400 x10(3)/mcL    MPV 10.9 (H) 7.4 - 10.4 fL    Neut % 72.8 %    Lymph % 17.3 %    Mono % 6.8 %    Eos % 2.1 %    Basophil % 0.6 %    Lymph # 0.92 0.6 - 4.6 x10(3)/mcL    Neut # 3.87 2.1 - 9.2 x10(3)/mcL    Mono # 0.36 0.1 - 1.3 x10(3)/mcL    Eos # 0.11 0 - 0.9 x10(3)/mcL    Baso # 0.03 <=0.2 x10(3)/mcL    IG# 0.02 0 - 0.04 x10(3)/mcL    IG% 0.4 %    NRBC% 0.0 %   Magnesium    Collection Time: 12/01/23  9:26 AM   Result Value Ref Range    Magnesium Level 1.80 1.60 - 2.60 mg/dL   Phosphorus    Collection Time: 12/01/23  9:26 AM   Result Value Ref Range    Phosphorus Level 3.0 2.3 - 4.7 mg/dL   Uric  Acid    Collection Time: 12/01/23  9:26 AM   Result Value Ref Range    Uric Acid 4.6 3.5 - 7.2 mg/dL   D-Dimer, Quantitative    Collection Time: 12/01/23  1:57 PM   Result Value Ref Range    D-Dimer <0.27 0.00 - 0.50 ug/mL FEU   Echo    Collection Time: 12/01/23  4:29 PM   Result Value Ref Range    LVIDd 5.01 3.5 - 6.0 cm    LV Systolic Volume 50.90 mL    LVIDs 3.50 2.1 - 4.0 cm    LV Diastolic Volume 119.00 mL    IVS 1.05 0.6 - 1.1 cm    FS 30 28 - 44 %    Left Ventricle Relative Wall Thickness 0.36 cm    Posterior Wall 0.91 0.6 - 1.1 cm    LV mass 177.70 g    MV Peak E Jack 0.69 m/s    TDI LATERAL 0.08 m/s    TDI SEPTAL 0.06 m/s    E/E' ratio 9.86 m/s    MV Peak A Jack 0.96 m/s    E/A ratio 0.72     E wave deceleration time 235.00 msec    LV SEPTAL E/E' RATIO 11.50 m/s    LV LATERAL E/E' RATIO 8.63 m/s    LVOT peak jack 0.59 m/s    Left Ventricular Outflow Tract Mean Velocity 0.38 cm/s    Left Ventricular Outflow Tract Mean Gradient 1.00 mmHg    LA size 2.80 cm    AV mean gradient 3 mmHg    AV peak gradient 7 mmHg    Ao peak jack 1.28 m/s    Ao VTI 30.90 cm    LVOT peak VTI 13.80 cm    AV Velocity Ratio 0.46     AV index (prosthetic) 0.45     Mean e' 0.07 m/s    Est. RA pres 3 mmHg   Osmolality, Serum    Collection Time: 12/01/23  6:29 PM   Result Value Ref Range    Osmolality 280 280 - 300 mOsm/kg   Sodium, Random Urine    Collection Time: 12/01/23 10:03 PM   Result Value Ref Range    Urine Sodium 42.0 mmol/L   Osmolality, Urine    Collection Time: 12/01/23 10:03 PM   Result Value Ref Range    Urine Osmolality 237 (L) 300 - 1,300 mOsm/kg   Comprehensive Metabolic Panel    Collection Time: 12/02/23  4:35 AM   Result Value Ref Range    Sodium Level 133 (L) 136 - 145 mmol/L    Potassium Level 4.5 3.5 - 5.1 mmol/L    Chloride 107 98 - 107 mmol/L    Carbon Dioxide 21 (L) 23 - 31 mmol/L    Glucose Level 108 82 - 115 mg/dL    Blood Urea Nitrogen 15.5 8.4 - 25.7 mg/dL    Creatinine 1.23 (H) 0.73 - 1.18 mg/dL    Calcium Level  Total 8.3 (L) 8.8 - 10.0 mg/dL    Protein Total 5.6 (L) 5.8 - 7.6 gm/dL    Albumin Level 3.2 (L) 3.4 - 4.8 g/dL    Globulin 2.4 2.4 - 3.5 gm/dL    Albumin/Globulin Ratio 1.3 1.1 - 2.0 ratio    Bilirubin Total 0.9 <=1.5 mg/dL    Alkaline Phosphatase 81 40 - 150 unit/L    Alanine Aminotransferase 23 0 - 55 unit/L    Aspartate Aminotransferase 20 5 - 34 unit/L    eGFR 59 mls/min/1.73/m2   CBC with Differential    Collection Time: 12/02/23  4:35 AM   Result Value Ref Range    WBC 4.24 (L) 4.50 - 11.50 x10(3)/mcL    RBC 3.41 (L) 4.70 - 6.10 x10(6)/mcL    Hgb 11.0 (L) 14.0 - 18.0 g/dL    Hct 31.0 (L) 42.0 - 52.0 %    MCV 90.9 80.0 - 94.0 fL    MCH 32.3 (H) 27.0 - 31.0 pg    MCHC 35.5 33.0 - 36.0 g/dL    RDW 12.1 11.5 - 17.0 %    Platelet 129 (L) 130 - 400 x10(3)/mcL    MPV 11.0 (H) 7.4 - 10.4 fL    Neut % 66.9 %    Lymph % 20.8 %    Mono % 9.2 %    Eos % 2.4 %    Basophil % 0.5 %    Lymph # 0.88 0.6 - 4.6 x10(3)/mcL    Neut # 2.84 2.1 - 9.2 x10(3)/mcL    Mono # 0.39 0.1 - 1.3 x10(3)/mcL    Eos # 0.10 0 - 0.9 x10(3)/mcL    Baso # 0.02 <=0.2 x10(3)/mcL    IG# 0.01 0 - 0.04 x10(3)/mcL    IG% 0.2 %    NRBC% 0.0 %    IPF 5.3 0.9 - 11.2 %     EKG: Reviewed     Telemetry:  SR    Physical Exam  Constitutional:       General: He is not in acute distress.     Appearance: Normal appearance.   HENT:      Head: Normocephalic.      Mouth/Throat:      Mouth: Mucous membranes are moist.   Eyes:      Extraocular Movements: Extraocular movements intact.      Conjunctiva/sclera: Conjunctivae normal.   Cardiovascular:      Rate and Rhythm: Normal rate and regular rhythm.      Pulses: Normal pulses.      Heart sounds: Normal heart sounds. No murmur heard.  Pulmonary:      Effort: Pulmonary effort is normal. No respiratory distress.      Breath sounds: Normal breath sounds.   Abdominal:      Palpations: Abdomen is soft.   Genitourinary:     Comments: + Urostomy Bag  Skin:     General: Skin is warm.      Comments: R Groin Soft/Flat, Non-Tender,  No Sign of Bleed/Infection. +2 BLE Palpable Pedal Pulses    Neurological:      General: No focal deficit present.      Mental Status: He is alert and oriented to person, place, and time.   Psychiatric:         Mood and Affect: Mood normal.         Behavior: Behavior normal.         Judgment: Judgment normal.       Home Medications:   No current facility-administered medications on file prior to encounter.     Current Outpatient Medications on File Prior to Encounter   Medication Sig Dispense Refill    atorvastatin (LIPITOR) 40 MG tablet Take 40 mg by mouth.      losartan (COZAAR) 50 MG tablet Take 50 mg by mouth once daily.      omeprazole (PRILOSEC) 20 MG capsule Take 20 mg by mouth once daily.      sucralfate (CARAFATE) 1 gram tablet Take 1 g by mouth 4 (four) times daily.      losartan-hydrochlorothiazide 50-12.5 mg (HYZAAR) 50-12.5 mg per tablet Take 1 tablet by mouth once daily.       Current Inpatient Medications:    Current Facility-Administered Medications:     acetaminophen tablet 650 mg, 650 mg, Oral, Q8H PRN, Miley Murphy, [facility]    acetaminophen tablet 650 mg, 650 mg, Oral, Q4H PRN, Tj Lovell MD    aluminum-magnesium hydroxide-simethicone 200-200-20 mg/5 mL suspension 30 mL, 30 mL, Oral, QID (AC & HS), Sarah Rosas MD, 30 mL at 12/02/23 0506    aspirin EC tablet 81 mg, 81 mg, Oral, Daily, Tj Lovell MD, 81 mg at 12/02/23 0849    atorvastatin tablet 40 mg, 40 mg, Oral, QHS, Norma Mitchell, ALEKSANDRA, 40 mg at 12/01/23 2107    atropine injection 0.5 mg, 0.5 mg, Intravenous, PRN, Tj Lovell MD    clopidogreL tablet 75 mg, 75 mg, Oral, Daily, Tj Lovell MD, 75 mg at 12/02/23 0849    hydrALAZINE injection 10 mg, 10 mg, Intravenous, Q6H PRN, Sarah Rosas MD    HYDROcodone-acetaminophen 5-325 mg per tablet 1 tablet, 1 tablet, Oral, Q4H PRN, Tj Lovell MD, 1 tablet at 12/01/23 2107    losartan tablet 50 mg, 50 mg, Oral, Daily, Norma Mitchell NP, 50 mg at 12/02/23 0849     ondansetron injection 4 mg, 4 mg, Intravenous, Q8H PRN, Miley Murphy, AGACNP-BC    pantoprazole EC tablet 40 mg, 40 mg, Oral, Daily, Sarah Rosas MD, 40 mg at 12/02/23 0849    polyethylene glycol packet 17 g, 17 g, Oral, BID, Sakshi Woodson MD    sodium chloride 0.9% flush 10 mL, 10 mL, Intravenous, PRN, Norma Mitchell NP    VTE Risk Mitigation (From admission, onward)           Ordered     IP VTE HIGH RISK PATIENT  Once         12/01/23 1340     Place sequential compression device  Until discontinued         12/01/23 1340                  Assessment:   Angina - Resolved     - s/p [facility] (12.1.23) - Successful IVUS guided PCI with shockwave lithotripsy of the proximal LAD   CAD/Stents    - [facility] (4.17.17) - LM: Patent, , LAD: Calcified type II vessel with 60-70% stenosis of the proximal to mid segment. FFR 0.88. multiple small patent diagonal branches. Previous stent on distal LAD proximal subsection, LCx: Very large patent codominate vessel, RCA: Small nondominant patent vessel   Sinus Bradycardia  HLD  Bilateral/VITA Mild    - Carotid US 4.4.22 - REAL & LICA 1-39% stenosis   HLD  HTN  GERD  Bladder Cancer s/p Urostomy   Renal Cancer  Hital Hernia   No Hx of GI Bleeding     Plan:   Continue ASA, Statin, ARB, Plavix  D/C Omeprazole and Start Protonix (Plavix)  No BB 2/2 Sinus Bradycardia; Add as OP if HR Allows  Groin Precautions  EKG Reviewed  Ok to D/C Home and F/U with [facility]: Dr. Mayo Next Week in [place]  We will be available as needed       Medication List        START taking these medications      aspirin 81 MG EC tablet  Commonly known as: ECOTRIN  Take 1 tablet (81 mg total) by mouth once daily.  Start taking on: December 3, 2023     clopidogreL 75 mg tablet  Commonly known as: PLAVIX  Take 1 tablet (75 mg total) by mouth once daily.  Start taking on: December 3, 2023     pantoprazole 40 MG tablet  Commonly known as: PROTONIX  Take 1 tablet (40 mg total) by mouth once daily.     polyethylene glycol 17 gram  Pwpk  Commonly known as: GLYCOLAX  Take 17 g by mouth 2 (two) times daily as needed for Constipation.            CONTINUE taking these medications      atorvastatin 40 MG tablet  Commonly known as: LIPITOR     losartan 50 MG tablet  Commonly known as: COZAAR     sucralfate 1 gram tablet  Commonly known as: CARAFATE            STOP taking these medications      losartan-hydrochlorothiazide 50-12.5 mg 50-12.5 mg per tablet  Commonly known as: HYZAAR     omeprazole 20 MG capsule  Commonly known as: PRILOSEC               Where to Get Your Medications        These medications were sent to [facility] - [facility] [address] [address]      Phone: [phone]   aspirin 81 MG EC tablet  clopidogreL 75 mg tablet  pantoprazole 40 MG tablet  polyethylene glycol 17 gram Pwpk        Fernandez Vickers, ANA  Cardiology  [facility]  12/02/2023

## 2023-12-02 NOTE — DISCHARGE SUMMARY
[facility] Hospital Medicine Discharge Summary    Admit Date: 12/1/2023  Discharge Date and Time: 12/2/20239:27 AM  Admitting Physician: TRAVON Team  Discharging Physician: Sakshi Woodson MD.  Primary Care Physician: David Kim Jr., MD      Discharge Diagnoses:  Angina   CAD status post PCI to LAD 12/1   History of CAD status post PCI in 2017   HTN   HLD  Sinus bradycardia   GERD   History of bladder cancer/renal cancer       Hospital Course:   83-year-old male with significant history of CAD status post PCI, bilateral carotid artery disease, HTN, HLD, GERD, bladder cancer, renal cancer, hiatal hernia presented to the ED with complaints of left-sided chest pain, dizziness and dyspnea.  Patient had recent ED visit with similar complaints. was discharged home since troponins were negative and EKG showed no ischemic changes . vitals stable in the ED .labs essentially unremarkable .patient was admitted hospitalist medicine service. troponins negative and EKG with no acute ST-T wave changes . cardiology was consulted.  Chest x-ray negative. cardiology decided to take him to cath lab for LHC .  Patient underwent successful PCI with shockwave lithotripsy of the proximal LAD.  Placed on dual antiplatelets post LHC with intervention . patient re-evaluated 12/2 . symptomatically stable.  No more having chest pain .hemodynamics and labs stable.  Discussed with Cardiology . patient was cleared for discharge by Cardiology .goal-directed medical treatment to be continued as outpatient .unable to tolerate beta-blocker given bradycardia . plan to add as outpatient if heart rate allows.patient discharged in stable condition on 12/02 . follow-up with Cardiology, PCP.  Vitals:  VITAL SIGNS: 24 HRS MIN & MAX LAST   Temp  Min: 97.6 °F (36.4 °C)  Max: 98.7 °F (37.1 °C) 98.2 °F (36.8 °C)   BP  Min: 106/57  Max: 180/86 (!) 149/74   Pulse  Min: 47  Max: 64  63   Resp  Min: 16  Max: 21 16   SpO2  Min: 96 %   Max: 100 % 98 %       Physical Exam:  General appearance:  No acute distress  HENT: Atraumatic   Lungs: Clear to auscultation bilaterally.   Heart: RRR,No edema  Abdomen: Soft, non tender   Extremities: warm  Neuro:  Awake, alert, oriented x4  Psych/mental status: Appropriate mood and affect.      Procedures Performed: No admission procedures for hospital encounter.     Significant Diagnostic Studies: See Full reports for all details    Recent Labs   Lab 11/28/23  1613 12/01/23  0926 12/02/23  0435   WBC 4.72 5.31 4.24*   RBC 4.01* 3.84* 3.41*   HGB 12.5* 12.6* 11.0*   HCT 37.4* 34.7* 31.0*   MCV 93.3 90.4 90.9   MCH 31.2* 32.8* 32.3*   MCHC 33.4 36.3* 35.5   RDW 12.0 12.0 12.1    147 129*   MPV 10.4 10.9* 11.0*       Recent Labs   Lab 11/28/23  1613 12/01/23  0926 12/02/23  0435   * 130* 133*   K 4.9 4.5 4.5   CO2 28 27 21*   BUN 18.6 18.3 15.5   CREATININE 1.37* 1.31* 1.23*   CALCIUM 9.2 8.9 8.3*   MG  --  1.80  --    ALBUMIN 4.4 4.1 3.2*   ALKPHOS 108 106 81   ALT 38 27 23   AST 32 23 20   BILITOT 1.6* 1.1 0.9        Microbiology Results (last 7 days)       ** No results found for the last 168 hours. **             Echo    Left Ventricle: The left ventricle is normal in size. Normal wall   thickness. Normal wall motion. There is normal systolic function with a   visually estimated ejection fraction of 55 - 60%. Grade I diastolic   dysfunction.    Right Ventricle: Normal right ventricular cavity size. Systolic   function is normal.    Aortic Valve: There is mild aortic valve sclerosis.    Mitral Valve: Mildly thickened leaflets. There is no stenosis.    IVC/SVC: Normal venous pressure at 3 mmHg.         Medication List        START taking these medications      aspirin 81 MG EC tablet  Commonly known as: ECOTRIN  Take 1 tablet (81 mg total) by mouth once daily.  Start taking on: December 3, 2023     clopidogreL 75 mg tablet  Commonly known as: PLAVIX  Take 1 tablet (75 mg total) by mouth once  daily.  Start taking on: December 3, 2023     polyethylene glycol 17 gram Pwpk  Commonly known as: GLYCOLAX  Take 17 g by mouth 2 (two) times daily as needed for Constipation.            CONTINUE taking these medications      atorvastatin 40 MG tablet  Commonly known as: LIPITOR     losartan 50 MG tablet  Commonly known as: COZAAR     omeprazole 20 MG capsule  Commonly known as: PRILOSEC     sucralfate 1 gram tablet  Commonly known as: CARAFATE            STOP taking these medications      losartan-hydrochlorothiazide 50-12.5 mg 50-12.5 mg per tablet  Commonly known as: HYZAAR               Where to Get Your Medications        These medications were sent to [facility] - [facility] [address] [facility]  [address] [facility] [address]      Phone: [phone]   aspirin 81 MG EC tablet  clopidogreL 75 mg tablet  polyethylene glycol 17 gram Pwpk          Explained in detail to the patient about the discharge plan, medications, and follow-up visits. Pt understands and agrees with the treatment plan  Discharge Disposition: Home or Self Care   Discharged Condition: stable  Diet-   Dietary Orders (From admission, onward)       Start     Ordered    12/01/23 1815  Diet heart healthy  (Diet/Nutrition [facility])  Diet effective now         12/01/23 1817                   Medications Per DC med rec  Activities as tolerated   Follow-up Information       David Kim Jr., MD Follow up in 1 week(s).    Specialty: Family Medicine  Contact information:  [address] [facility] [address]  [facility] [address] [phone]               Tj Lovell MD Follow up in 1 week(s).    Specialty: Cardiology  Contact information:  [address]  [facility] [address] [phone]                           For further questions contact hospitalist office    Discharge time 33 minutes    For worsening symptoms, chest pain, shortness of breath, increased abdominal pain, high grade fever,  stroke or stroke like symptoms, immediately go to the nearest Emergency Room or call 911 as soon as possible.      Sakshi Velasquez M.D, on 12/2/2023. at 9:27 AM.

## 2023-12-04 ENCOUNTER — PATIENT OUTREACH (OUTPATIENT)
Dept: ADMINISTRATIVE | Facility: CLINIC | Age: 83
End: 2023-12-04
Payer: OTHER GOVERNMENT

## 2023-12-04 LAB
POC ACTIVATED CLOTTING TIME K: 179 SEC (ref 74–137)
SAMPLE: ABNORMAL

## 2023-12-04 NOTE — PROGRESS NOTES
C3 nurse spoke with Mikel Jewell and wife on speaker for a TCC post hospital discharge follow up call. The patient has a scheduled HOSFU appointment with David Kim Jr., MD  today 12/04/2023 @ 330 pm.  Appointment with Dr. Mayo on 12/06/2023.

## 2023-12-05 ENCOUNTER — PATIENT MESSAGE (OUTPATIENT)
Dept: ADMINISTRATIVE | Facility: OTHER | Age: 83
End: 2023-12-05
Payer: OTHER GOVERNMENT

## 2023-12-07 ENCOUNTER — HOSPITAL ENCOUNTER (OUTPATIENT)
Facility: HOSPITAL | Age: 83
Discharge: HOME | End: 2023-12-09
Attending: STUDENT IN AN ORGANIZED HEALTH CARE EDUCATION/TRAINING PROGRAM | Admitting: INTERNAL MEDICINE
Payer: OTHER GOVERNMENT

## 2023-12-07 DIAGNOSIS — I25.10 CORONARY ARTERY DISEASE, UNSPECIFIED VESSEL OR LESION TYPE, UNSPECIFIED WHETHER ANGINA PRESENT, UNSPECIFIED WHETHER NATIVE OR TRANSPLANTED HEART: Primary | ICD-10-CM

## 2023-12-07 DIAGNOSIS — R07.9 CHEST PAIN: ICD-10-CM

## 2023-12-07 LAB
ALBUMIN SERPL-MCNC: 3.8 G/DL (ref 3.4–4.8)
ALBUMIN/GLOB SERPL: 1.5 RATIO (ref 1.1–2)
ALP SERPL-CCNC: 94 UNIT/L (ref 40–150)
ALT SERPL-CCNC: 18 UNIT/L (ref 0–55)
AST SERPL-CCNC: 20 UNIT/L (ref 5–34)
BASOPHILS # BLD AUTO: 0.01 X10(3)/MCL
BASOPHILS NFR BLD AUTO: 0.2 %
BILIRUB SERPL-MCNC: 1 MG/DL
BNP BLD-MCNC: 110.6 PG/ML
BUN SERPL-MCNC: 14.9 MG/DL (ref 8.4–25.7)
CALCIUM SERPL-MCNC: 8.7 MG/DL (ref 8.8–10)
CHLORIDE SERPL-SCNC: 102 MMOL/L (ref 98–107)
CO2 SERPL-SCNC: 26 MMOL/L (ref 23–31)
CREAT SERPL-MCNC: 1.34 MG/DL (ref 0.73–1.18)
EOSINOPHIL # BLD AUTO: 0.08 X10(3)/MCL (ref 0–0.9)
EOSINOPHIL NFR BLD AUTO: 1.6 %
ERYTHROCYTE [DISTWIDTH] IN BLOOD BY AUTOMATED COUNT: 11.9 % (ref 11.5–17)
GFR SERPLBLD CREATININE-BSD FMLA CKD-EPI: 53 MLS/MIN/1.73/M2
GLOBULIN SER-MCNC: 2.5 GM/DL (ref 2.4–3.5)
GLUCOSE SERPL-MCNC: 103 MG/DL (ref 82–115)
HCT VFR BLD AUTO: 32 % (ref 42–52)
HGB BLD-MCNC: 11.3 G/DL (ref 14–18)
IMM GRANULOCYTES # BLD AUTO: 0.01 X10(3)/MCL (ref 0–0.04)
IMM GRANULOCYTES NFR BLD AUTO: 0.2 %
INR PPP: 1.1
LYMPHOCYTES # BLD AUTO: 0.82 X10(3)/MCL (ref 0.6–4.6)
LYMPHOCYTES NFR BLD AUTO: 16.9 %
MCH RBC QN AUTO: 32.5 PG (ref 27–31)
MCHC RBC AUTO-ENTMCNC: 35.3 G/DL (ref 33–36)
MCV RBC AUTO: 92 FL (ref 80–94)
MONOCYTES # BLD AUTO: 0.32 X10(3)/MCL (ref 0.1–1.3)
MONOCYTES NFR BLD AUTO: 6.6 %
NEUTROPHILS # BLD AUTO: 3.62 X10(3)/MCL (ref 2.1–9.2)
NEUTROPHILS NFR BLD AUTO: 74.5 %
NRBC BLD AUTO-RTO: 0 %
PLATELET # BLD AUTO: 128 X10(3)/MCL (ref 130–400)
PMV BLD AUTO: 10.7 FL (ref 7.4–10.4)
POTASSIUM SERPL-SCNC: 5.2 MMOL/L (ref 3.5–5.1)
PROT SERPL-MCNC: 6.3 GM/DL (ref 5.8–7.6)
PROTHROMBIN TIME: 13.6 SECONDS (ref 12.5–14.5)
RBC # BLD AUTO: 3.48 X10(6)/MCL (ref 4.7–6.1)
SODIUM SERPL-SCNC: 135 MMOL/L (ref 136–145)
TROPONIN I SERPL-MCNC: 0.01 NG/ML (ref 0–0.04)
TROPONIN I SERPL-MCNC: 0.02 NG/ML (ref 0–0.04)
TROPONIN I SERPL-MCNC: 0.03 NG/ML (ref 0–0.04)
WBC # SPEC AUTO: 4.86 X10(3)/MCL (ref 4.5–11.5)

## 2023-12-07 PROCEDURE — 93010 ELECTROCARDIOGRAM REPORT: CPT | Mod: ,,, | Performed by: INTERNAL MEDICINE

## 2023-12-07 PROCEDURE — 25000242 PHARM REV CODE 250 ALT 637 W/ HCPCS: Performed by: STUDENT IN AN ORGANIZED HEALTH CARE EDUCATION/TRAINING PROGRAM

## 2023-12-07 PROCEDURE — 84484 ASSAY OF TROPONIN QUANT: CPT | Mod: 91 | Performed by: STUDENT IN AN ORGANIZED HEALTH CARE EDUCATION/TRAINING PROGRAM

## 2023-12-07 PROCEDURE — G0378 HOSPITAL OBSERVATION PER HR: HCPCS

## 2023-12-07 PROCEDURE — 93005 ELECTROCARDIOGRAM TRACING: CPT

## 2023-12-07 PROCEDURE — 85025 COMPLETE CBC W/AUTO DIFF WBC: CPT | Performed by: NURSE PRACTITIONER

## 2023-12-07 PROCEDURE — 25000242 PHARM REV CODE 250 ALT 637 W/ HCPCS

## 2023-12-07 PROCEDURE — 93010 EKG 12-LEAD: ICD-10-PCS | Mod: ,,, | Performed by: INTERNAL MEDICINE

## 2023-12-07 PROCEDURE — 83880 ASSAY OF NATRIURETIC PEPTIDE: CPT | Performed by: NURSE PRACTITIONER

## 2023-12-07 PROCEDURE — 80053 COMPREHEN METABOLIC PANEL: CPT | Performed by: NURSE PRACTITIONER

## 2023-12-07 PROCEDURE — 84484 ASSAY OF TROPONIN QUANT: CPT | Performed by: NURSE PRACTITIONER

## 2023-12-07 PROCEDURE — 85610 PROTHROMBIN TIME: CPT | Performed by: NURSE PRACTITIONER

## 2023-12-07 PROCEDURE — 25000003 PHARM REV CODE 250: Performed by: STUDENT IN AN ORGANIZED HEALTH CARE EDUCATION/TRAINING PROGRAM

## 2023-12-07 PROCEDURE — 99285 EMERGENCY DEPT VISIT HI MDM: CPT | Mod: 25

## 2023-12-07 RX ORDER — SODIUM CHLORIDE 0.9 % (FLUSH) 0.9 %
10 SYRINGE (ML) INJECTION
Status: DISCONTINUED | OUTPATIENT
Start: 2023-12-07 | End: 2023-12-09 | Stop reason: HOSPADM

## 2023-12-07 RX ORDER — NITROGLYCERIN 0.4 MG/1
0.4 TABLET SUBLINGUAL EVERY 5 MIN PRN
Status: DISCONTINUED | OUTPATIENT
Start: 2023-12-07 | End: 2023-12-09 | Stop reason: HOSPADM

## 2023-12-07 RX ORDER — ONDANSETRON 2 MG/ML
4 INJECTION INTRAMUSCULAR; INTRAVENOUS EVERY 8 HOURS PRN
Status: DISCONTINUED | OUTPATIENT
Start: 2023-12-07 | End: 2023-12-09 | Stop reason: HOSPADM

## 2023-12-07 RX ORDER — NITROGLYCERIN 0.4 MG/1
TABLET SUBLINGUAL
Status: COMPLETED
Start: 2023-12-07 | End: 2023-12-07

## 2023-12-07 RX ORDER — NITROGLYCERIN 0.4 MG/1
0.4 TABLET SUBLINGUAL EVERY 5 MIN PRN
Status: DISCONTINUED | OUTPATIENT
Start: 2023-12-07 | End: 2023-12-07

## 2023-12-07 RX ORDER — ASPIRIN 325 MG
TABLET ORAL
Status: COMPLETED
Start: 2023-12-07 | End: 2023-12-07

## 2023-12-07 RX ORDER — ASPIRIN 325 MG
325 TABLET, DELAYED RELEASE (ENTERIC COATED) ORAL
Status: COMPLETED | OUTPATIENT
Start: 2023-12-07 | End: 2023-12-07

## 2023-12-07 RX ADMIN — ASPIRIN 325 MG: 325 TABLET, COATED ORAL at 03:12

## 2023-12-07 RX ADMIN — NITROGLYCERIN 0.4 MG: 0.4 TABLET, ORALLY DISINTEGRATING SUBLINGUAL at 03:12

## 2023-12-07 RX ADMIN — NITROGLYCERIN 0.4 MG: 0.4 TABLET, ORALLY DISINTEGRATING SUBLINGUAL at 10:12

## 2023-12-07 RX ADMIN — NITROGLYCERIN 0.4 MG: 0.4 TABLET SUBLINGUAL at 03:12

## 2023-12-07 NOTE — Clinical Note
Diagnosis: Chest pain [642708]   Future Attending Provider: NEIL MCMAHON [[id number]]   Admit to which facility:: [facility] [[id number]]   Admitting Provider:: NEIL MCMAHON [[id number]]

## 2023-12-07 NOTE — FIRST PROVIDER EVALUATION
Medical screening examination initiated.  I have conducted a focused provider triage encounter, findings are as follows:    Brief history of present illness:  Patient states chest pain and nausea. Patient has a cardiac stent placed last week.     There were no vitals filed for this visit.    Pertinent physical exam:  Awake, alert, ambulatory      Brief workup plan:  Labs, EKG, Imaging    Preliminary workup initiated; this workup will be continued and followed by the physician or advanced practice provider that is assigned to the patient when roomed.

## 2023-12-07 NOTE — ED PROVIDER NOTES
Encounter Date: 12/7/2023    SCRIBE #1 NOTE: I, Felisa Rutherford, am scribing for, and in the presence of,  Sourav Monae IV, MD. I have scribed the following portions of the note - Other sections scribed: HPI, ROS, PE.       History     Chief Complaint   Patient presents with    Chest Pain     Pt seen here on Friday and had one stent placed by dr li . Pt c/o chest pain and nausea     83 year old male with history of bladder cancer, renal cancer, CAD, GERD, HTN, and cardiac stents presents to the ED with complaints of chest pain. Pt reports that he had a stent placed 6 days ago after having an angiogram that showed 85% blockage. Pt states that he has had some discomfort since the procedure, but today it progressed to pain with associated SOB and light-headedness. Pt notes that the pain is pressure-like and feels the same way it felt last week when the blockage was discovered.     The history is provided by the patient. No  was used.     Review of patient's allergies indicates:  No Known Allergies  Past Medical History:   Diagnosis Date    Bladder cancer     Coronary artery disease     GERD (gastroesophageal reflux disease)     Hypertension     Renal cancer     Testicular pain      Past Surgical History:   Procedure Laterality Date    ATHERECTOMY  12/1/2023    Procedure: Atherectomy;  Surgeon: Tj Lovell MD;  Location: [facility] CATH LAB;  Service: Cardiology;;    CHOLECYSTECTOMY      COLONOSCOPY      CORONARY ANGIOPLASTY WITH STENT PLACEMENT      ESOPHAGOGASTRODUODENOSCOPY      HERNIA REPAIR Bilateral     IVUS, CORONARY  12/1/2023    Procedure: IVUS, Coronary;  Surgeon: Tj Lovell MD;  Location: [facility] CATH LAB;  Service: Cardiology;;    LEFT HEART CATHETERIZATION N/A 12/1/2023    Procedure: Left heart cath;  Surgeon: Tj Lovell MD;  Location: [facility] CATH LAB;  Service: Cardiology;  Laterality: N/A;    NEPHRECTOMY      PENILE PROSTHESIS IMPLANT      PERCUTANEOUS CORONARY  INTERVENTION, ARTERY N/A 2023    Procedure: Percutaneous coronary intervention;  Surgeon: Tj Lovell MD;  Location: [facility] CATH LAB;  Service: Cardiology;  Laterality: N/A;    PROSTATE SURGERY  10/2022    Removal with bladder removal    Removal of bladder tumors      ROBOT-ASSISTED LAPAROSCOPIC CYSTECTOMY N/A 10/11/2022    Procedure: ROBOTIC CYSTECTOMY;  Surgeon: Flip Casas MD;  Location: [facility] OR;  Service: Urology;  Laterality: N/A;  ROBOT ASSISTED CYSTECTOMY WITH URINARY DIVERSION //   ANY INDICATED PROCEDURES    ROBOT-ASSISTED LAPAROSCOPIC PROSTATECTOMY USING DA RASHEED XI  10/11/2022    Procedure: XI ROBOTIC PROSTATECTOMY;  Surgeon: Flip Casas MD;  Location: [facility] OR;  Service: Urology;;    SMALL INTESTINE SURGERY  10/2022    Ileistomy    TURBT, WITH BLUE LIGHT CYSTOSCOPY AND CYSVIEW      urostomy       Family History   Problem Relation Age of Onset    Depression Mother             Diabetes Mother             Cancer Brother              Social History     Tobacco Use    Smoking status: Former     Current packs/day: 1.50     Average packs/day: 1.5 packs/day for 15.0 years (22.5 ttl pk-yrs)     Types: Cigarettes    Smokeless tobacco: Never    Tobacco comments:     Dont remember dates. - appx dates 3036-4810   Substance Use Topics    Alcohol use: Never    Drug use: Never     Review of Systems   Constitutional:  Negative for chills and fever.   HENT:  Negative for congestion, rhinorrhea and sore throat.    Eyes:  Negative for visual disturbance.   Respiratory:  Positive for shortness of breath. Negative for cough.    Cardiovascular:  Positive for chest pain.   Gastrointestinal:  Negative for abdominal pain, nausea and vomiting.   Genitourinary:  Negative for dysuria and hematuria.   Musculoskeletal:  Negative for joint swelling.   Skin:  Negative for rash.   Neurological:  Positive for light-headedness. Negative for weakness.   Psychiatric/Behavioral:  Negative for  confusion.    All other systems reviewed and are negative.      Physical Exam     Initial Vitals [12/07/23 1314]   BP Pulse Resp Temp SpO2   (!) 194/79 (!) 51 18 97.9 °F (36.6 °C) 100 %      MAP       --         Physical Exam    Nursing note and vitals reviewed.  Constitutional: He is not diaphoretic. No distress.   Cardiovascular:  Normal rate and regular rhythm.           Pulmonary/Chest: No respiratory distress.   Abdominal: Abdomen is soft. He exhibits no distension. There is no abdominal tenderness.     Neurological: He is alert.   Psychiatric: He has a normal mood and affect.         ED Course   Procedures  Labs Reviewed   COMPREHENSIVE METABOLIC PANEL - Abnormal; Notable for the following components:       Result Value    Sodium Level 135 (*)     Potassium Level 5.2 (*)     Creatinine 1.34 (*)     Calcium Level Total 8.7 (*)     All other components within normal limits   B-TYPE NATRIURETIC PEPTIDE - Abnormal; Notable for the following components:    Natriuretic Peptide 110.6 (*)     All other components within normal limits   CBC WITH DIFFERENTIAL - Abnormal; Notable for the following components:    RBC 3.48 (*)     Hgb 11.3 (*)     Hct 32.0 (*)     MCH 32.5 (*)     Platelet 128 (*)     MPV 10.7 (*)     All other components within normal limits   TROPONIN I - Normal   PROTIME-INR - Normal   CBC W/ AUTO DIFFERENTIAL    Narrative:     The following orders were created for panel order CBC Auto Differential.  Procedure                               Abnormality         Status                     ---------                               -----------         ------                     CBC with Differential[[id number]]       Abnormal            Final result                 Please view results for these tests on the individual orders.   TROPONIN I     EKG Readings: (Independently Interpreted)   Initial Reading: No STEMI. Rhythm: Sinus Bradycardia. Heart Rate: 52. Ectopy: No Ectopy. Conduction: Normal. ST Segments:  Normal ST Segments. T Waves: Normal. Clinical Impression: Sinus Bradycardia Other Impression: no ischemic changes   EKG performed at 13:13       Imaging Results              X-Ray Chest PA And Lateral (Final result)  Result time 12/07/23 13:34:57      Final result by Rip Cullen MD (12/07/23 13:34:57)                   Impression:      No acute cardiopulmonary process identified.      Electronically signed by: Rip Cullen  Date:    12/07/2023  Time:    13:34               Narrative:    EXAMINATION:  XR CHEST PA AND LATERAL    CLINICAL HISTORY:  chest pain;    TECHNIQUE:  Two-view    COMPARISON:  December 1, 2023.    FINDINGS:  Cardiopericardial silhouette is within normal limits. Lungs are without dense focal or segmental consolidation, congestion, pleural effusion or pneumothorax.                                       Medications   sodium chloride 0.9% flush 10 mL (has no administration in time range)   ondansetron injection 4 mg (has no administration in time range)   nitroGLYCERIN SL tablet 0.4 mg (has no administration in time range)   aspirin EC tablet 325 mg (325 mg Oral Given 12/7/23 1530)   aspirin 325 MG tablet (  Override Pull 12/7/23 1530)     Medical Decision Making  83-year-old with a history of CAD recent stent placement a week ago by [facility] presenting with chest pain shortness of breath sounds cardiac in nature nonischemic EKG 1st troponin negative  Discussed with [facility] they will admit treating with aspirin nitro p.r.n. here ER    Differential diagnosis (including but not limited to):   ACS musculoskeletal pain GERD pleural effusion pneumonia pneumothorax      Problems Addressed:  Chest pain: acute illness or injury that poses a threat to life or bodily functions  Coronary artery disease, unspecified vessel or lesion type, unspecified whether angina present, unspecified whether native or transplanted heart: chronic illness or injury with exacerbation, progression, or side effects of  treatment    Amount and/or Complexity of Data Reviewed  External Data Reviewed: labs, radiology, ECG and notes.  Labs: ordered.  Radiology: ordered and independent interpretation performed.     Details: CXR - no obvious infiltrates, consolidations, pleural effusions, or pneumothorax.      ECG/medicine tests: ordered and independent interpretation performed.  Discussion of management or test interpretation with external provider(s): Magda CUNNINGHAM will admit    Risk  OTC drugs.  Prescription drug management.  Decision regarding hospitalization.              Attending Attestation:           Physician Attestation for Scribe:  Physician Attestation Statement for Scribe #1: ISourav IV, MD, reviewed documentation, as scribed by Felisa Rutherford in my presence, and it is both accurate and complete.             ED Course as of 12/07/23 1720   Thu Dec 07, 2023   1629 [facility] will admit [AC]      ED Course User Index  [AC] Sourav Monae IV, MD                           Clinical Impression:  Final diagnoses:  [R07.9] Chest pain  [I25.10] Coronary artery disease, unspecified vessel or lesion type, unspecified whether angina present, unspecified whether native or transplanted heart (Primary)          ED Disposition Condition    Observation Stable                Sourav Monae IV, MD  12/07/23 2875

## 2023-12-08 LAB
ALBUMIN SERPL-MCNC: 3.8 G/DL (ref 3.4–4.8)
ALBUMIN/GLOB SERPL: 1.6 RATIO (ref 1.1–2)
ALP SERPL-CCNC: 90 UNIT/L (ref 40–150)
ALT SERPL-CCNC: 19 UNIT/L (ref 0–55)
AST SERPL-CCNC: 20 UNIT/L (ref 5–34)
BASOPHILS # BLD AUTO: 0.02 X10(3)/MCL
BASOPHILS NFR BLD AUTO: 0.4 %
BILIRUB SERPL-MCNC: 1.3 MG/DL
BUN SERPL-MCNC: 15.4 MG/DL (ref 8.4–25.7)
CALCIUM SERPL-MCNC: 8.8 MG/DL (ref 8.8–10)
CHLORIDE SERPL-SCNC: 102 MMOL/L (ref 98–107)
CO2 SERPL-SCNC: 25 MMOL/L (ref 23–31)
CREAT SERPL-MCNC: 1.25 MG/DL (ref 0.73–1.18)
EOSINOPHIL # BLD AUTO: 0.08 X10(3)/MCL (ref 0–0.9)
EOSINOPHIL NFR BLD AUTO: 1.5 %
ERYTHROCYTE [DISTWIDTH] IN BLOOD BY AUTOMATED COUNT: 11.9 % (ref 11.5–17)
GFR SERPLBLD CREATININE-BSD FMLA CKD-EPI: 57 MLS/MIN/1.73/M2
GLOBULIN SER-MCNC: 2.4 GM/DL (ref 2.4–3.5)
GLUCOSE SERPL-MCNC: 87 MG/DL (ref 82–115)
HCT VFR BLD AUTO: 33.5 % (ref 42–52)
HGB BLD-MCNC: 11.6 G/DL (ref 14–18)
IMM GRANULOCYTES # BLD AUTO: 0.02 X10(3)/MCL (ref 0–0.04)
IMM GRANULOCYTES NFR BLD AUTO: 0.4 %
LYMPHOCYTES # BLD AUTO: 0.78 X10(3)/MCL (ref 0.6–4.6)
LYMPHOCYTES NFR BLD AUTO: 14.7 %
MAGNESIUM SERPL-MCNC: 2.1 MG/DL (ref 1.6–2.6)
MCH RBC QN AUTO: 31.6 PG (ref 27–31)
MCHC RBC AUTO-ENTMCNC: 34.6 G/DL (ref 33–36)
MCV RBC AUTO: 91.3 FL (ref 80–94)
MONOCYTES # BLD AUTO: 0.31 X10(3)/MCL (ref 0.1–1.3)
MONOCYTES NFR BLD AUTO: 5.8 %
NEUTROPHILS # BLD AUTO: 4.1 X10(3)/MCL (ref 2.1–9.2)
NEUTROPHILS NFR BLD AUTO: 77.2 %
NRBC BLD AUTO-RTO: 0 %
PLATELET # BLD AUTO: 132 X10(3)/MCL (ref 130–400)
PLATELETS.RETICULATED NFR BLD AUTO: 5.9 % (ref 0.9–11.2)
PMV BLD AUTO: 11.1 FL (ref 7.4–10.4)
POTASSIUM SERPL-SCNC: 5.3 MMOL/L (ref 3.5–5.1)
PROT SERPL-MCNC: 6.2 GM/DL (ref 5.8–7.6)
RBC # BLD AUTO: 3.67 X10(6)/MCL (ref 4.7–6.1)
SODIUM SERPL-SCNC: 133 MMOL/L (ref 136–145)
TROPONIN I SERPL-MCNC: 0.02 NG/ML (ref 0–0.04)
TROPONIN I SERPL-MCNC: 0.02 NG/ML (ref 0–0.04)
WBC # SPEC AUTO: 5.31 X10(3)/MCL (ref 4.5–11.5)

## 2023-12-08 PROCEDURE — G0378 HOSPITAL OBSERVATION PER HR: HCPCS

## 2023-12-08 PROCEDURE — 93010 EKG 12-LEAD: ICD-10-PCS | Mod: ,,, | Performed by: INTERNAL MEDICINE

## 2023-12-08 PROCEDURE — 83735 ASSAY OF MAGNESIUM: CPT | Performed by: NURSE PRACTITIONER

## 2023-12-08 PROCEDURE — 93010 ELECTROCARDIOGRAM REPORT: CPT | Mod: ,,, | Performed by: INTERNAL MEDICINE

## 2023-12-08 PROCEDURE — 25000003 PHARM REV CODE 250: Performed by: NURSE PRACTITIONER

## 2023-12-08 PROCEDURE — 85025 COMPLETE CBC W/AUTO DIFF WBC: CPT | Performed by: NURSE PRACTITIONER

## 2023-12-08 PROCEDURE — 25000003 PHARM REV CODE 250: Performed by: INTERNAL MEDICINE

## 2023-12-08 PROCEDURE — 84484 ASSAY OF TROPONIN QUANT: CPT | Performed by: STUDENT IN AN ORGANIZED HEALTH CARE EDUCATION/TRAINING PROGRAM

## 2023-12-08 PROCEDURE — 80053 COMPREHEN METABOLIC PANEL: CPT | Performed by: NURSE PRACTITIONER

## 2023-12-08 PROCEDURE — 93005 ELECTROCARDIOGRAM TRACING: CPT

## 2023-12-08 RX ORDER — PANTOPRAZOLE SODIUM 40 MG/1
40 TABLET, DELAYED RELEASE ORAL DAILY
Status: DISCONTINUED | OUTPATIENT
Start: 2023-12-08 | End: 2023-12-09 | Stop reason: HOSPADM

## 2023-12-08 RX ORDER — ASPIRIN 81 MG/1
81 TABLET ORAL DAILY
Status: DISCONTINUED | OUTPATIENT
Start: 2023-12-08 | End: 2023-12-09 | Stop reason: HOSPADM

## 2023-12-08 RX ORDER — AMLODIPINE BESYLATE 2.5 MG/1
2.5 TABLET ORAL DAILY
Status: DISCONTINUED | OUTPATIENT
Start: 2023-12-08 | End: 2023-12-09 | Stop reason: HOSPADM

## 2023-12-08 RX ORDER — ATORVASTATIN CALCIUM 40 MG/1
40 TABLET, FILM COATED ORAL NIGHTLY
Status: DISCONTINUED | OUTPATIENT
Start: 2023-12-08 | End: 2023-12-09 | Stop reason: HOSPADM

## 2023-12-08 RX ORDER — ACETAMINOPHEN 325 MG/1
650 TABLET ORAL EVERY 6 HOURS PRN
Status: DISCONTINUED | OUTPATIENT
Start: 2023-12-08 | End: 2023-12-09 | Stop reason: HOSPADM

## 2023-12-08 RX ORDER — LOSARTAN POTASSIUM 50 MG/1
50 TABLET ORAL DAILY
Status: DISCONTINUED | OUTPATIENT
Start: 2023-12-08 | End: 2023-12-09 | Stop reason: HOSPADM

## 2023-12-08 RX ORDER — CLOPIDOGREL BISULFATE 75 MG/1
75 TABLET ORAL DAILY
Status: DISCONTINUED | OUTPATIENT
Start: 2023-12-08 | End: 2023-12-09 | Stop reason: HOSPADM

## 2023-12-08 RX ADMIN — LOSARTAN POTASSIUM 50 MG: 50 TABLET, FILM COATED ORAL at 11:12

## 2023-12-08 RX ADMIN — ACETAMINOPHEN 650 MG: 325 TABLET, FILM COATED ORAL at 07:12

## 2023-12-08 RX ADMIN — ASPIRIN 81 MG: 81 TABLET, COATED ORAL at 11:12

## 2023-12-08 RX ADMIN — AMLODIPINE BESYLATE 2.5 MG: 2.5 TABLET ORAL at 06:12

## 2023-12-08 RX ADMIN — PANTOPRAZOLE SODIUM 40 MG: 40 TABLET, DELAYED RELEASE ORAL at 11:12

## 2023-12-08 NOTE — H&P
[facility] - [facility] [address] Medical Telemetry    Cardiology  History and Physical     Patient Name: Mikel Jewell  MRN: [id number]  Admission Date: 12/7/2023  Code Status: Full Code   Attending Provider: Anshu Carr MD   Primary Care Physician: David Kim Jr., MD  Principal Problem:<principal problem not specified>    Patient information was obtained from patient, past medical records, and ER records.     Subjective:     Chief Complaint:  Chest Pain    HPI: 83-year-old male that is known to [facility]/Dr. Mayo with a PMHx of CAD s/p PCI, BCAS, HLD, HTN & GERD. He reported to [facility] on 12.7.23 with complaints of chest pain. Of note he recently had a [facility] with PCI to Proximal LAD on 12.1.23. He reports he has had some discomfort since his procedure. However, yesterday the pain worsened in intensity and was associated with SOB. He repots the pain has a pressure like character and feels the same as his previous anginal symptoms. ED Course: VS: HR 51, /79, RR 18 Temp 97.9F. O2 100% on RA. Lab work revealed: WBC 4.86, H/JH 11.3/32.0, , INR 1.1, Na 135, K 5.2, BUN/Cr 14.9/1.34, Ca 8.7, AST/ALT 20/18, .6, Troponin 0.014 --> 0.022 --> 0.026 --> 0.017. CXR No acute cardiopulmonary process identified. EKG shows sinus bradycardia with no ST changes. Patient was loaded with ASA 325mg, SL nitroglycerin started. Patient denies SOB, nausea, or palpitations. Patient complains of CP. [facility] will admit to observation for chest pain rule out.       PMH: CAD s/p PCI, BCAS, HLD, HTN, GERD, bladder cancer, renal cancer, hital hernia   PSH: Cholecystectomy, colonoscopy, LHC with PCI (2017), hernia repai, nephrectomy, Penile implant, prostate surgery, Removable of bladder tumors, ileostomy, urostomy   Family History: Father: CAD, Cancer; Mother: depression, DM  Social History:      Previous Cardiac Diagnostics:   [facility] 12.1.23:  Bilateral selective coronary angiography  Left heart catheterization  Pre and  post IVUS of the LAD  Shockwave lithotripsy to LAD with a 4x12 mm Shockwave balloon  Percutaneous coronary intervention with a 4 x 24 mm Synergy XD stent  50% distal LCx  Mild/mod RCA disease  Assessment:  Successful IVUS guided PCI with shockwave lithotripsy of the proximal LAD  Plan:  Dual anti-platelet therapy for minimum of 6-12 months.  Lifelong aspirin therapy.  Optimization of CAD with guideline directed medical therapy.    Carotid US 4.4.22:  The study quality is average.   1-39% stenosis in the proximal right internal carotid artery based on Bluth Criteria.   1-39% stenosis in the proximal left internal carotid artery based on Bluth Criteria.   Antegrade right vertebral artery flow.   Antegrade left vertebral artery flow.      [facility] 4.17.17:  LM: Patent with luminal regularities   LAD: Calcified type II vessel with 60-70% stenosis of the proximal to mid segment. FFR 0.88. multiple small patent diagonal branches. There is a previous stent on distal LAD proximal subsection  LCx: Very large patent codominate vessel  RCA: Small nondominant patent vessel      [facility] 3.30.17:  LM - patent vessel  LAD - large type II vessel with diffuse 40-50% proximal stenosis. The distal LAD hazy 75% stenosis --> 50% s/p PCI   D1 - 50-60% stenosis   LCx - Large dominate vessel with diffuse luminal regularities   RCA - medium to large size nondominant vessel with diffuse luminal regularities      ECHO 10.8.19:  The study quality is average.   The left ventricle is normal in size. Global left ventricular systolic function is normal. The left ventricular ejection fraction is 60%. Left ventricular diastolic function is indeterminate.   Dilatation of the aortic root (4.0 cms) and ascending aorta (3.8 cms) is noted.   Mild (1+) mitral regurgitation.   The pulmonary artery systolic pressure is 31 mmHg.       Review of patient's allergies indicates:  No Known Allergies    No current facility-administered medications on file prior to  encounter.     Current Outpatient Medications on File Prior to Encounter   Medication Sig    aspirin (ECOTRIN) 81 MG EC tablet Take 1 tablet (81 mg total) by mouth once daily.    atorvastatin (LIPITOR) 40 MG tablet Take 40 mg by mouth.    clopidogreL (PLAVIX) 75 mg tablet Take 1 tablet (75 mg total) by mouth once daily.    losartan (COZAAR) 50 MG tablet Take 50 mg by mouth once daily.    pantoprazole (PROTONIX) 40 MG tablet Take 1 tablet (40 mg total) by mouth once daily.    polyethylene glycol (GLYCOLAX) 17 gram PwPk Take 17 g by mouth 2 (two) times daily as needed for Constipation.    sucralfate (CARAFATE) 1 gram tablet Take 1 g by mouth 4 (four) times daily.       Review of Systems   Constitutional: Negative for chills and fever.   Cardiovascular:  Positive for chest pain. Negative for palpitations and syncope.   Respiratory:  Negative for cough and shortness of breath.    Skin: Negative.    Gastrointestinal:  Negative for abdominal pain, nausea and vomiting.     Objective:     Vital Signs (Most Recent):  Temp: 98.2 °F (36.8 °C) (12/08/23 0755)  Pulse: 87 (12/08/23 0755)  Resp: 18 (12/08/23 0755)  BP: (!) 145/65 (12/08/23 0755)  SpO2: 100 % (12/08/23 0755) Vital Signs (24h Range):  Temp:  [97.6 °F (36.4 °C)-98.2 °F (36.8 °C)] 98.2 °F (36.8 °C)  Pulse:  [45-87] 87  Resp:  [16-20] 18  SpO2:  [96 %-100 %] 100 %  BP: (128-194)/(65-79) 145/65     Weight: 86.7 kg (191 lb 2.2 oz)  Body mass index is 26.66 kg/m².    SpO2: 100 %       No intake or output data in the 24 hours ending 12/08/23 0828    Lines/Drains/Airways       Drain  Duration                  Urostomy 10/11/22 ureterostomy, right  days              Peripheral Intravenous Line  Duration                  Peripheral IV - Single Lumen 12/07/23 1441 20 G Left Antecubital <1 day                    Physical Exam  Constitutional:       Appearance: Normal appearance.   HENT:      Head: Normocephalic.      Mouth/Throat:      Mouth: Mucous membranes are moist.    Cardiovascular:      Rate and Rhythm: Normal rate and regular rhythm.      Pulses: Normal pulses.      Heart sounds: Normal heart sounds. No murmur heard.  Pulmonary:      Effort: Pulmonary effort is normal. No respiratory distress.      Breath sounds: Normal breath sounds.   Abdominal:      General: Abdomen is flat. There is no distension.      Palpations: Abdomen is soft.   Skin:     General: Skin is warm.   Neurological:      Mental Status: He is alert and oriented to person, place, and time.   Psychiatric:         Mood and Affect: Mood normal.         Behavior: Behavior normal.         Judgment: Judgment normal.         EKG:       Telemetry: SR    Significant Labs: BMP:   Recent Labs   Lab 12/07/23  1444   *   K 5.2*   CO2 26   BUN 14.9   CREATININE 1.34*   CALCIUM 8.7*   , CMP  Recent Labs   Lab 12/07/23  1444   *   K 5.2*   CO2 26   BUN 14.9   CREATININE 1.34*   CALCIUM 8.7*   ALBUMIN 3.8   BILITOT 1.0   ALKPHOS 94   AST 20   ALT 18   , CBC   Recent Labs   Lab 12/07/23  1330   WBC 4.86   HGB 11.3*   HCT 32.0*   *   , INR   Recent Labs   Lab 12/07/23  0244   INR 1.1   PROTIME 13.6   , and Troponin   Recent Labs   Lab 12/07/23  1702 12/07/23  2240 12/08/23  0639   TROPONINI 0.022 0.026 0.017       Significant Imaging:   Results for orders placed or performed during the hospital encounter of 12/01/23   Echo   Result Value Ref Range    LVIDd 5.01 3.5 - 6.0 cm    LV Systolic Volume 50.90 mL    LVIDs 3.50 2.1 - 4.0 cm    LV Diastolic Volume 119.00 mL    IVS 1.05 0.6 - 1.1 cm    FS 30 28 - 44 %    Left Ventricle Relative Wall Thickness 0.36 cm    Posterior Wall 0.91 0.6 - 1.1 cm    LV mass 177.70 g    MV Peak E Jack 0.69 m/s    TDI LATERAL 0.08 m/s    TDI SEPTAL 0.06 m/s    E/E' ratio 9.86 m/s    MV Peak A Jack 0.96 m/s    E/A ratio 0.72     E wave deceleration time 235.00 msec    LV SEPTAL E/E' RATIO 11.50 m/s    LV LATERAL E/E' RATIO 8.63 m/s    LVOT peak jack 0.59 m/s    Left Ventricular Outflow  Tract Mean Velocity 0.38 cm/s    Left Ventricular Outflow Tract Mean Gradient 1.00 mmHg    LA size 2.80 cm    AV mean gradient 3 mmHg    AV peak gradient 7 mmHg    Ao peak ritika 1.28 m/s    Ao VTI 30.90 cm    LVOT peak VTI 13.80 cm    AV Velocity Ratio 0.46     AV index (prosthetic) 0.45     Mean e' 0.07 m/s    Est. RA pres 3 mmHg    Narrative      Left Ventricle: The left ventricle is normal in size. Normal wall   thickness. Normal wall motion. There is normal systolic function with a   visually estimated ejection fraction of 55 - 60%. Grade I diastolic   dysfunction.    Right Ventricle: Normal right ventricular cavity size. Systolic   function is normal.    Aortic Valve: There is mild aortic valve sclerosis.    Mitral Valve: Mildly thickened leaflets. There is no stenosis.    IVC/SVC: Normal venous pressure at 3 mmHg.       Results for orders placed or performed during the hospital encounter of 12/07/23   X-Ray Chest PA And Lateral    Narrative    EXAMINATION:  XR CHEST PA AND LATERAL    CLINICAL HISTORY:  chest pain;    TECHNIQUE:  Two-view    COMPARISON:  December 1, 2023.    FINDINGS:  Cardiopericardial silhouette is within normal limits. Lungs are without dense focal or segmental consolidation, congestion, pleural effusion or pneumothorax.      Impression    No acute cardiopulmonary process identified.      Electronically signed by: Rip Cullen  Date:    12/07/2023  Time:    13:34     Assessment and Plan:   Chest Pain   - Trop 0.014 --> 0.022 --> 0.026 --> 0.017   - EKG shows no acute ST changes   - Recurrent CP    - [facility] (12.1.23): LM:  Mild disease, LAD:  85% calcified proximal stenosis, 30% mid stenosis, patent distal stent, LCx:  Dominant.  Focal 50% distal stenosis. RCA:  Non dominant.  Diffuse mild-to-moderate disease. (Successful IVUS guided PCI with shockwave lithotripsy of the proximal LAD)   - [facility] (4.17.17) ~ LM: Patent, , LAD: Calcified type II vessel with 60-70% stenosis of the proximal to mid  segment. FFR 0.88. multiple small patent diagonal branches. Previous stent on distal LAD proximal subsection, LCx: Very large patent codominate vessel, RCA: Small nondominant patent vessel   HLD  BCAS   - Carotid US 4.4.22 ~ REAL & LICA 1-39% stenosis   HTN  GERD  Bladder Cancer  Renal Cancer  Hital Hernia   No Hx of GI bleeding      PLAN:  EKG & biomarkers reviewed  Loaded with  mg   Restart home statin, ASA 81mg, & Plavix 75mg   Restarted home antihypertensive medications      VTE Risk Mitigation (From admission, onward)           Ordered     IP VTE HIGH RISK PATIENT  Once         12/07/23 1638     Place sequential compression device  Until discontinued         12/07/23 1638                    Norma Mitchell NP  Cardiology  [facility] - [facility] Medical Telemetry  12/08/2023 8:28 AM    See below MDM component performed by me (Seth Donnelly MD):    CAD/PCI    Recent LAD intervention, 50% distal Lcx remains  CP (non-exertional)    Trops ok, EKG ok, do not suspect stent thrombosis  HTN, very high on admit, still above goal here (less at home)    HR runs on the low end here, will hold off on BB  Hiatal hernia, being worked up by GI    Recs:  Add Norvasc 2.5 mg daily  Cont other home meds  Consult GI today, he is already NPO, can stay NPO until GI sees him

## 2023-12-08 NOTE — PLAN OF CARE
12/08/23 1608   Discharge Assessment   Assessment Type Discharge Planning Assessment   Confirmed/corrected address, phone number and insurance Yes   Confirmed Demographics Correct on Facesheet   Source of Information patient   Communicated AVIVA with patient/caregiver Date not available/Unable to determine   Reason For Admission Chest Pain   People in Home spouse   Do you expect to return to your current living situation? Yes   Do you have help at home or someone to help you manage your care at home? Yes   Who are your caregiver(s) and their phone number(s)? Dot (spouse )   Prior to hospitilization cognitive status: Unable to Assess   Current cognitive status: Alert/Oriented   Walking or Climbing Stairs Difficulty yes   Walking or Climbing Stairs ambulation difficulty, requires equipment   Mobility Management RW   Dressing/Bathing Difficulty no   Home Accessibility stairs to enter home   Number of Stairs, Main Entrance four   Home Layout Able to live on 1st floor   Equipment Currently Used at Home rollator   Do you currently have service(s) that help you manage your care at home? No   Do you take prescription medications? Yes   Do you have prescription coverage? Yes   [facility]   Who is going to help you get home at discharge? Spouse   How do you get to doctors appointments? car, drives self   Are you on dialysis? No   Do you take coumadin? No   Discharge Plan A Home with family   Discharge Plan B Home with family   DME Needed Upon Discharge  none   Discharge Plan discussed with: Patient;Spouse/sig other   OTHER   Name(s) of People in Home Dot (spouse)     Patient lives with spouse and has an urostomy tube that has been in place for over a year. [facility] is Dr. David Kim. At time of assessment patient presents with oxygen via nasal cannula. If unable to wean off prior to dc may need home oxygen. Patient and wife at time of assessment see no needs for home health  services. CM following.

## 2023-12-08 NOTE — CONSULTS
Mikel Jewell   MRN: [id number]   ADMISSION DATE: 2023  : 1940  AGE: 83 y.o.    DATE :  2023       PROVIDER: BRENDAN CHRISTIAN    REASON FOR REFERRAL: Atypical chest pain, history of hiatal hernia    SUBJECTIVE:  83-year-old male that is known to [facility]/Dr. Mayo with a PMHx of CAD s/p PCI, BCAS, HLD, HTN & GERD. He reported to [facility] on 23 with complaints of chest pain. Of note he recently had a [facility] with PCI to Proximal LAD on 23. He reports he has had some discomfort since his procedure. However, yesterday the pain worsened in intensity and was associated with SOB. He repots the pain has a pressure like character and feels the same as his previous anginal symptoms. ED Course: VS: HR 51, /79, RR 18 Temp 97.9F. O2 100% on RA. Lab work revealed: WBC 4.86, H/JH 11.3/32.0, , INR 1.1, Na 135, K 5.2, BUN/Cr 14.9/1.34, Ca 8.7, AST/ALT 20/18, .6, Troponin 0.014 --> 0.022 --> 0.026 --> 0.017. CXR No acute cardiopulmonary process identified. EKG shows sinus bradycardia with no ST changes. Patient was loaded with ASA 325mg, SL nitroglycerin started. Patient denies SOB, nausea, or palpitations. Patient complains of CP. [facility] will admit to observation for chest pain rule out.      Patient is known to Dr. Bala Thompson as his primary gastroenterologist.  Please note that this consult was called in around 4:15 p.m..  Apparently the consult was placed around noon.  Patient has history of atypical chest discomfort for the last several years with no recent change per patient.  He underwent cardiac workup last week.  Please refer to the cath report for details.  According to patient and his family, he was being considered for possible upper endoscopy per Dr. Bala Thompson pending cardiac evaluation.  He denies any significant heartburn or reflux.  Occasional nausea.  No vomiting.  No melena, hematochezia or bright red blood per rectum.  No hematemesis.  He had upper endoscopy done  several years ago.  There was a small hiatal hernia reported.  He also had a colonoscopy by Dr. Thompson approximately 3 years ago.  He is followed up at [facility] with Dr. Lemus.     Review of Systems   No fever or chills.  Occasional nausea.  No vomiting.  History of atypical chest pain as above.  No significant shortness of breath.  No abdominal pain.  No melena, hematochezia bright red blood per rectum.  No hematemesis.  Denies any significant heartburn or reflux.      Review of patient's allergies indicates:  No Known Allergies      amLODIPine  2.5 mg Oral Daily    aspirin  81 mg Oral Daily    atorvastatin  40 mg Oral QHS    clopidogreL  75 mg Oral Daily    losartan  50 mg Oral Daily    pantoprazole  40 mg Oral Daily       Medications Discontinued During This Encounter   Medication Reason    nitroGLYCERIN SL tablet 0.4 mg             Past Medical History:   Diagnosis Date    Bladder cancer     Coronary artery disease     GERD (gastroesophageal reflux disease)     Hypertension     Renal cancer     Testicular pain       Social History     Socioeconomic History    Marital status:    Tobacco Use    Smoking status: Former     Current packs/day: 1.50     Average packs/day: 1.5 packs/day for 15.0 years (22.5 ttl pk-yrs)     Types: Cigarettes    Smokeless tobacco: Never    Tobacco comments:     Dont remember dates. - appx dates 3643-2084   Substance and Sexual Activity    Alcohol use: Never    Drug use: Never    Sexual activity: Yes     Partners: Female     Birth control/protection: Post-menopausal      Past Surgical History:   Procedure Laterality Date    ATHERECTOMY  12/1/2023    Procedure: Atherectomy;  Surgeon: Tj Lovell MD;  Location: [facility] CATH LAB;  Service: Cardiology;;    CHOLECYSTECTOMY      COLONOSCOPY      CORONARY ANGIOPLASTY WITH STENT PLACEMENT      ESOPHAGOGASTRODUODENOSCOPY      HERNIA REPAIR Bilateral     IVUS, CORONARY  12/1/2023    Procedure: IVUS, Coronary;  Surgeon: Tj Lovell  MD JETT;  Location: [facility] CATH LAB;  Service: Cardiology;;    LEFT HEART CATHETERIZATION N/A 2023    Procedure: Left heart cath;  Surgeon: Tj Lovell MD;  Location: [facility] CATH LAB;  Service: Cardiology;  Laterality: N/A;    NEPHRECTOMY      PENILE PROSTHESIS IMPLANT      PERCUTANEOUS CORONARY INTERVENTION, ARTERY N/A 2023    Procedure: Percutaneous coronary intervention;  Surgeon: Tj Lovell MD;  Location: [facility] CATH LAB;  Service: Cardiology;  Laterality: N/A;    PROSTATE SURGERY  10/2022    Removal with bladder removal    Removal of bladder tumors      ROBOT-ASSISTED LAPAROSCOPIC CYSTECTOMY N/A 10/11/2022    Procedure: ROBOTIC CYSTECTOMY;  Surgeon: Flip Casas MD;  Location: [facility] OR;  Service: Urology;  Laterality: N/A;  ROBOT ASSISTED CYSTECTOMY WITH URINARY DIVERSION //   ANY INDICATED PROCEDURES    ROBOT-ASSISTED LAPAROSCOPIC PROSTATECTOMY USING DA RASHEED XI  10/11/2022    Procedure: XI ROBOTIC PROSTATECTOMY;  Surgeon: Flip Casas MD;  Location: [facility];  Service: Urology;;    SMALL INTESTINE SURGERY  10/2022    Ileistomy    TURBT, WITH BLUE LIGHT CYSTOSCOPY AND CYSVIEW      urostomy          Family History   Problem Relation Age of Onset    Depression Mother             Diabetes Mother             Cancer Brother                   OBJECTIVE:     Vitals:    23 1116 23 1153 23 1200 23 1549   BP: (!) 150/74 (!) 150/74  (!) 149/67   Pulse: (!) 55  (!) 55 (!) 54   Resp: 18   19   Temp: 98.4 °F (36.9 °C)   98 °F (36.7 °C)   TempSrc: Oral   Oral   SpO2: 100%  98% 100%   Weight:       Height:         Physical Exam   HEENT examination pink conjunctiva, anicteric sclera   Neck:  Supple   Chest:  Decreased breath sounds bilaterally   Heart examination:  S1, S2 audible  Abdomen:  Bowel sounds positive, soft, nontender   Extremities: No edema    LABS    Recent Labs   Lab 23  0435 23  1330 23  1028   WBC 4.24* 4.86 5.31   HGB 11.0*  "11.3* 11.6*   HCT 31.0* 32.0* 33.5*   * 128* 132      Recent Labs   Lab 12/02/23  0435 12/07/23  1444 12/08/23  1028   * 135* 133*   K 4.5 5.2* 5.3*   CO2 21* 26 25   BUN 15.5 14.9 15.4   CREATININE 1.23* 1.34* 1.25*   CALCIUM 8.3* 8.7* 8.8   BILITOT 0.9 1.0 1.3   ALKPHOS 81 94 90   ALT 23 18 19   AST 20 20 20   GLUCOSE 108 103 87      Recent Labs   Lab 12/07/23  0244   INR 1.1    No results for input(s): "AMYLASE" in the last 168 hours.   Recent Labs   Lab 12/07/23  0244   INR 1.1           RESULTS: X-Ray Chest PA And Lateral    Result Date: 12/7/2023  EXAMINATION: XR CHEST PA AND LATERAL CLINICAL HISTORY: chest pain; TECHNIQUE: Two-view COMPARISON: December 1, 2023. FINDINGS: Cardiopericardial silhouette is within normal limits. Lungs are without dense focal or segmental consolidation, congestion, pleural effusion or pneumothorax.     No acute cardiopulmonary process identified. Electronically signed by: Rip Cullen Date:    12/07/2023 Time:    13:34    Echo    Result Date: 12/2/2023    Left Ventricle: The left ventricle is normal in size. Normal wall thickness. Normal wall motion. There is normal systolic function with a visually estimated ejection fraction of 55 - 60%. Grade I diastolic dysfunction.   Right Ventricle: Normal right ventricular cavity size. Systolic function is normal.   Aortic Valve: There is mild aortic valve sclerosis.   Mitral Valve: Mildly thickened leaflets. There is no stenosis.   IVC/SVC: Normal venous pressure at 3 mmHg.     Cardiac catheterization    Result Date: 12/1/2023  The procedure log was documented by No documenter listed and verified by Tj Lovell MD. Procedure: Bilateral selective coronary angiography Left heart catheterization Pre and post IVUS of the LAD Shockwave lithotripsy to LAD with a 4x12 mm Shockwave balloon Percutaneous coronary intervention with a 4 x 24 mm Synergy XD stent Preoperative diagnosis: Unstable angina CAD with history of PCI " Postoperative diagnosis: As above Obstructive coronary disease within the proximal LAD Access: Right common femoral artery Estimated blood loss: 10 cc Complications: None Summary: Consent obtained.  Risks and benefits discussed with the patient.  The patient was brought to the cath lab in a fasting state.  The patient was prepped and draped in usual sterile fashion.  A  anitha-procedural time-out was performed.  Ultrasound-guided right common femoral arterial access via modified Seldinger technique with micropuncture.  A 5 Venezuelan sheath was inserted into the right common femoral artery.  A 5 Venezuelan pigtail catheter was used across the aortic valve and LV pressures were measured.  The transvalvular aortic gradient was measured by pullback method.  [place]an JL4 was used to cannulate the left main coronary artery multiple fluoroscopic views were obtained.  A 5 Venezuelan JR4 was used to cannulate the right coronary artery and a single fluoroscopic view was obtained.  5 Venezuelan sheath was upsized to a 6 Venezuelan sheath.  A 6 Venezuelan EBU 3.75 was used to cannulate the left main coronary artery.  An 0.14'' Run-through wire was placed into the distal LAD.  IVUS performed showing 360 circumferential calcium.  Multiple pulses were delivered via a 4 x 12 mm shockwave balloon at 4 JAVI.  Next, a 4 x 24 mm drug-eluting stent was deployed.  Entire stent was post dilated with a 4 mm noncompliant balloon at high pressures.  IVUS showed good stent apposition.  Final cine showed good stent apposition, no dissection or perforation and NATANAEL 3 flow within the distal vessel  At the end of the procedure all wires and catheters removed.  The sheath was left in place to be pulled once ACT is at goal. Findings: Left main coronary artery:  Mild disease Left anterior descending artery:  85% calcified proximal stenosis, 30% mid stenosis, patent distal stent Left circumflex artery:  Dominant.  Focal 50% distal stenosis. Right coronary artery:  Non dominant.   Diffuse mild-to-moderate disease LVEDP:  6 mm Hg No significant transvalvular aortic gradient by pullback method Assessment: -Successful IVUS guided PCI with shockwave lithotripsy of the proximal LAD Plan: -Dual anti-platelet therapy for minimum of 6-12 months.  Lifelong aspirin therapy. -Optimization of CAD with guideline directed medical therapy. Date: 12/1/2023  Time: 8:03 PM     X-Ray Chest PA And Lateral    Result Date: 12/1/2023  EXAMINATION: XR CHEST PA AND LATERAL CLINICAL HISTORY: chest pain; TECHNIQUE: Two-view COMPARISON: November 27, 2023. FINDINGS: Cardiopericardial silhouette is within normal limits. Lungs are without dense focal or segmental consolidation, congestion, pleural effusion or pneumothorax.     No acute cardiopulmonary process identified. Electronically signed by: Rip Cullen Date:    12/01/2023 Time:    10:03    X-Ray Chest PA And Lateral    Result Date: 11/27/2023  EXAMINATION: XR CHEST PA AND LATERAL CLINICAL HISTORY: Chest pain, unspecified TECHNIQUE: Two views COMPARISON: November 25, 2023. FINDINGS: Cardiopericardial silhouette is within normal limits. Lungs are without dense focal or segmental consolidation, congestion, pleural effusion or pneumothorax.     No acute cardiopulmonary process identified. Electronically signed by: Rip Cullen Date:    11/27/2023 Time:    11:28    X-Ray Chest AP Portable    Result Date: 11/25/2023  EXAMINATION: XR CHEST AP PORTABLE CLINICAL HISTORY: Chest Pain; COMPARISON: Chest x-ray dated 10/03/2023 FINDINGS: The heart is normal in size.  The lungs are clear.  There is no pleural effusion or visible pneumothorax.     No acute abnormality of the chest. Electronically signed by: Irasema Wilson Date:    11/25/2023 Time:    15:03             ICD-10-CM ICD-9-CM   1. Coronary artery disease, unspecified vessel or lesion type, unspecified whether angina present, unspecified whether native or transplanted heart  I25.10 414.00   2. Chest pain  R07.9 786.50         ASSESSMENT & PLAN:   83-year-old male that is known to [facility]/Dr. Mayo with a PMHx of CAD s/p PCI, BCAS, HLD, HTN & GERD. He reported to [facility] on 12.7.23 with complaints of chest pain. Of note he recently had a [facility] with PCI to Proximal LAD on 12.1.23. He reports he has had some discomfort since his procedure. However, yesterday the pain worsened in intensity and was associated with SOB. He repots the pain has a pressure like character and feels the same as his previous anginal symptoms. ED Course: VS: HR 51, /79, RR 18 Temp 97.9F. O2 100% on RA. Lab work revealed: WBC 4.86, H/JH 11.3/32.0, , INR 1.1, Na 135, K 5.2, BUN/Cr 14.9/1.34, Ca 8.7, AST/ALT 20/18, .6, Troponin 0.014 --> 0.022 --> 0.026 --> 0.017. CXR No acute cardiopulmonary process identified. EKG shows sinus bradycardia with no ST changes. Patient was loaded with ASA 325mg, SL nitroglycerin started. Patient denies SOB, nausea, or palpitations. Patient complains of CP. [facility] will admit to observation for chest pain rule out.      Patient is known to Dr. Bala Thompson as his primary gastroenterologist.  Please note that this consult was called in around 4:15 p.m..  Apparently the consult was placed around noon.  Patient has history of atypical chest discomfort for the last several years with no recent change per patient.  He underwent cardiac workup last week.  Please refer to the cath report for details.  According to patient and his family, he was being considered for possible upper endoscopy per Dr. Bala Thompson pending cardiac evaluation.  He denies any significant heartburn or reflux.  Occasional nausea.  No vomiting.  No melena, hematochezia or bright red blood per rectum.  No hematemesis.  He had upper endoscopy done several years ago.  There was a small hiatal hernia reported.  He also had a colonoscopy by Dr. Thompson approximately 3 years ago.  He is followed up at [facility] with   Allan.    Recommendations:  1. Antireflux measures.  Continue PPI.    2. Recent coronary intervention.  Cardiology service on follow up.    3. Patient will need follow up with his gastroenterologist Dr. Thompson as outpatient.  No evidence of overt GI blood loss.  Workup for hiatal hernia in progress as outpatient.  If patient remains in hospital, upper endoscopy can be considered possibly on Monday.  This was discussed with the patient's family at the bedside in detail.  Also discussed with the nursing staff.      Thank you for the consult.

## 2023-12-08 NOTE — NURSING
Nurses Note -- 4 Eyes      12/7/2023   9:30 PM      Skin assessed during: Admit      [x] No Altered Skin Integrity Present    []Prevention Measures Documented      [] Yes- Altered Skin Integrity Present or Discovered   [] LDA Added if Not in Epic (Describe Wound)   [] New Altered Skin Integrity was Present on Admit and Documented in LDA   [] Wound Image Taken    Wound Care Consulted? No    Attending Nurse:  Ankit Tan RN/Staff Member:  Turner Swift RN

## 2023-12-09 VITALS
SYSTOLIC BLOOD PRESSURE: 135 MMHG | HEIGHT: 71 IN | BODY MASS INDEX: 26.76 KG/M2 | DIASTOLIC BLOOD PRESSURE: 73 MMHG | OXYGEN SATURATION: 96 % | HEART RATE: 54 BPM | WEIGHT: 191.13 LBS | TEMPERATURE: 98 F | RESPIRATION RATE: 18 BRPM

## 2023-12-09 PROBLEM — R07.9 CHEST PAIN: Status: ACTIVE | Noted: 2023-12-09

## 2023-12-09 PROCEDURE — G0378 HOSPITAL OBSERVATION PER HR: HCPCS

## 2023-12-09 PROCEDURE — 25000003 PHARM REV CODE 250: Performed by: NURSE PRACTITIONER

## 2023-12-09 RX ORDER — AMLODIPINE BESYLATE 2.5 MG/1
2.5 TABLET ORAL DAILY
Qty: 30 TABLET | Refills: 3 | Status: SHIPPED | OUTPATIENT
Start: 2023-12-10 | End: 2024-12-09

## 2023-12-09 RX ADMIN — LOSARTAN POTASSIUM 50 MG: 50 TABLET, FILM COATED ORAL at 08:12

## 2023-12-09 RX ADMIN — CLOPIDOGREL BISULFATE 75 MG: 75 TABLET ORAL at 08:12

## 2023-12-09 RX ADMIN — ASPIRIN 81 MG: 81 TABLET, COATED ORAL at 08:12

## 2023-12-09 RX ADMIN — PANTOPRAZOLE SODIUM 40 MG: 40 TABLET, DELAYED RELEASE ORAL at 08:12

## 2023-12-09 RX ADMIN — AMLODIPINE BESYLATE 2.5 MG: 2.5 TABLET ORAL at 08:12

## 2023-12-09 NOTE — DISCHARGE SUMMARY
[facility] - [facility] Medical Telemetry    Cardiology  Discharge Summary      Patient Name: Mikel Jewell  MRN: [id number]  Admission Date: 12/7/2023  Hospital Length of Stay: 0 days  Discharge Date and Time:  12/09/2023 2:41 PM  Attending Physician: No att. providers found  Discharging Provider: Norma Mitchell NP  Primary Care Physician: David Kim Jr., MD    Chief Complaint:  Chest Pain     HPI: 83-year-old male that is known to [facility]/Dr. Mayo with a PMHx of CAD s/p PCI, BCAS, HLD, HTN & GERD. He reported to [facility] on 12.7.23 with complaints of chest pain. Of note he recently had a [facility] with PCI to Proximal LAD on 12.1.23. He reports he has had some discomfort since his procedure. However, yesterday the pain worsened in intensity and was associated with SOB. He repots the pain has a pressure like character and feels the same as his previous anginal symptoms. ED Course: VS: HR 51, /79, RR 18 Temp 97.9F. O2 100% on RA. Lab work revealed: WBC 4.86, H/JH 11.3/32.0, , INR 1.1, Na 135, K 5.2, BUN/Cr 14.9/1.34, Ca 8.7, AST/ALT 20/18, .6, Troponin 0.014 --> 0.022 --> 0.026 --> 0.017. CXR No acute cardiopulmonary process identified. EKG shows sinus bradycardia with no ST changes. Patient was loaded with ASA 325mg, SL nitroglycerin started. Patient denies SOB, nausea, or palpitations. Patient complains of CP. [facility] will admit to observation for chest pain rule out.     Hospital course:   12.9.23: NAD, VSS, she denies SOB, CP, or palpitations.     PMH: CAD s/p PCI, BCAS, HLD, HTN, GERD, bladder cancer, renal cancer, hital hernia   PSH: Cholecystectomy, colonoscopy, [facility] with PCI (2017), hernia repai, nephrectomy, Penile implant, prostate surgery, Removable of bladder tumors, ileostomy, urostomy   Family History: Father: CAD, Cancer; Mother: depression, DM  Social History:      Previous Cardiac Diagnostics:   [facility] 12.1.23:  Bilateral selective coronary angiography  Left heart  catheterization  Pre and post IVUS of the LAD  Shockwave lithotripsy to LAD with a 4x12 mm Shockwave balloon  Percutaneous coronary intervention with a 4 x 24 mm Synergy XD stent  50% distal LCx  Mild/mod RCA disease  Assessment:  Successful IVUS guided PCI with shockwave lithotripsy of the proximal LAD  Plan:  Dual anti-platelet therapy for minimum of 6-12 months.  Lifelong aspirin therapy.  Optimization of CAD with guideline directed medical therapy.     Carotid US 4.4.22:  The study quality is average.   1-39% stenosis in the proximal right internal carotid artery based on Bluth Criteria.   1-39% stenosis in the proximal left internal carotid artery based on Bluth Criteria.   Antegrade right vertebral artery flow.   Antegrade left vertebral artery flow.      [facility] 4.17.17:  LM: Patent with luminal regularities   LAD: Calcified type II vessel with 60-70% stenosis of the proximal to mid segment. FFR 0.88. multiple small patent diagonal branches. There is a previous stent on distal LAD proximal subsection  LCx: Very large patent codominate vessel  RCA: Small nondominant patent vessel      [facility] 3.30.17:  LM - patent vessel  LAD - large type II vessel with diffuse 40-50% proximal stenosis. The distal LAD hazy 75% stenosis --> 50% s/p PCI   D1 - 50-60% stenosis   LCx - Large dominate vessel with diffuse luminal regularities   RCA - medium to large size nondominant vessel with diffuse luminal regularities      ECHO 10.8.19:  The study quality is average.   The left ventricle is normal in size. Global left ventricular systolic function is normal. The left ventricular ejection fraction is 60%. Left ventricular diastolic function is indeterminate.   Dilatation of the aortic root (4.0 cms) and ascending aorta (3.8 cms) is noted.   Mild (1+) mitral regurgitation.   The pulmonary artery systolic pressure is 31 mmHg.     Consults:   Consults (From admission, onward)          Status Ordering Provider     Inpatient consult to  Gastroenterology  Once        Provider:  Bala Thompson MD    Completed ADDIS DOE              Final Active Diagnoses:    Diagnosis Date Noted POA    PRINCIPAL PROBLEM:  Chest pain [R07.9] 12/09/2023 Unknown      Problems Resolved During this Admission:       Discharged Condition: good    Review of Systems   Constitutional: Negative for chills and fever.   HENT: Negative.     Cardiovascular: Negative.  Negative for chest pain, palpitations and syncope.   Respiratory:  Negative for cough and shortness of breath.    Skin: Negative.    Musculoskeletal: Negative.  Negative for back pain.   Gastrointestinal: Negative.  Negative for abdominal pain, nausea and vomiting.   Neurological: Negative.    Psychiatric/Behavioral: Negative.         Physical Exam  Constitutional:       Appearance: Normal appearance.   HENT:      Head: Normocephalic.      Nose: Nose normal.      Mouth/Throat:      Mouth: Mucous membranes are moist.   Cardiovascular:      Rate and Rhythm: Normal rate.      Pulses: Normal pulses.      Heart sounds: Normal heart sounds. No murmur heard.  Pulmonary:      Effort: Pulmonary effort is normal. No respiratory distress.      Breath sounds: Normal breath sounds.   Abdominal:      General: Abdomen is flat.      Palpations: Abdomen is soft.   Skin:     General: Skin is warm.   Neurological:      Mental Status: He is alert and oriented to person, place, and time.   Psychiatric:         Mood and Affect: Mood normal.         Behavior: Behavior normal.         Judgment: Judgment normal.         Disposition: Home or Self Care    Follow Up:   Follow-up Information       Chao Mayo MD Follow up in 1 week(s).    Specialties: Cardiovascular Disease, Cardiology  Why: Dr. Mayo's office will call with an appointment time and date!  Contact information:  [address] [place] [id number]  [phone]               Bala Thompson MD Follow up in 3 day(s).    Specialty: Gastroenterology  Contact  information:  [address] [facility] [address] [facility] [place] [address]  [phone]                           Patient Instructions:      Diet Cardiac     Activity as tolerated     Medications:  Reconciled Home Medications:      Medication List        START taking these medications      amLODIPine 2.5 MG tablet  Commonly known as: NORVASC  Take 1 tablet (2.5 mg total) by mouth once daily.  Start taking on: December 10, 2023            CONTINUE taking these medications      aspirin 81 MG EC tablet  Commonly known as: ECOTRIN  Take 1 tablet (81 mg total) by mouth once daily.     atorvastatin 40 MG tablet  Commonly known as: LIPITOR  Take 40 mg by mouth.     clopidogreL 75 mg tablet  Commonly known as: PLAVIX  Take 1 tablet (75 mg total) by mouth once daily.     losartan 50 MG tablet  Commonly known as: COZAAR  Take 50 mg by mouth once daily.     pantoprazole 40 MG tablet  Commonly known as: PROTONIX  Take 1 tablet (40 mg total) by mouth once daily.     polyethylene glycol 17 gram Pwpk  Commonly known as: GLYCOLAX  Take 17 g by mouth 2 (two) times daily as needed for Constipation.     sucralfate 1 gram tablet  Commonly known as: CARAFATE  Take 1 g by mouth 4 (four) times daily.              Impression:  Chest Pain   - Trop 0.014 --> 0.022 --> 0.026 --> 0.017   - EKG shows no acute ST changes   - Recurrent CP    - [facility] (12.1.23): LM:  Mild disease, LAD:  85% calcified proximal stenosis, 30% mid stenosis, patent distal stent, LCx:  Dominant.  Focal 50% distal stenosis. RCA:  Non dominant.  Diffuse mild-to-moderate disease. (Successful IVUS guided PCI with shockwave lithotripsy of the proximal LAD)   - [facility] (4.17.17) ~ LM: Patent, , LAD: Calcified type II vessel with 60-70% stenosis of the proximal to mid segment. FFR 0.88. multiple small patent diagonal branches. Previous stent on distal LAD proximal subsection, LCx: Very large patent codominate vessel, RCA: Small nondominant patent vessel   HLD  BCAS   - Carotid  4.4.22 ~ REAL & LICA  1-39% stenosis   HTN  GERD  Bladder Cancer  Renal Cancer  Hital Hernia   No Hx of GI bleeding    Plan:   EKG & biomarkers reviewed  Loaded with  mg   Cont statin, ASA 81mg, & Plavix 75mg   Cont antihypertensive medications  Okay to discharge home   Will follow up with GI as an outpatient for hiatal hernia workup           Time spent on the discharge of patient: 38 minutes    Norma Mitchell NP  Cardiology  [facility] - [facility] Medical Telemetry

## 2023-12-09 NOTE — NURSING
Went over d/c instructions with pt and son regarding help at home, follow up appts, and prescriptions. No questions or concerns. Verbalized understanding. IV removed and tele d/c. Stable at d/c.

## 2023-12-18 ENCOUNTER — HOSPITAL ENCOUNTER (OUTPATIENT)
Facility: HOSPITAL | Age: 83
Discharge: HOME | End: 2023-12-18
Attending: INTERNAL MEDICINE | Admitting: INTERNAL MEDICINE
Payer: MEDICARE

## 2023-12-18 DIAGNOSIS — R07.9 CHEST PAIN: ICD-10-CM

## 2023-12-18 DIAGNOSIS — I25.10 CORONARY ATHEROSCLEROSIS OF NATIVE CORONARY ARTERY: ICD-10-CM

## 2023-12-18 LAB — CATH EF QUANTITATIVE: 55 %

## 2023-12-18 PROCEDURE — 99152 MOD SED SAME PHYS/QHP 5/>YRS: CPT | Performed by: INTERNAL MEDICINE

## 2023-12-18 PROCEDURE — C1894 INTRO/SHEATH, NON-LASER: HCPCS | Performed by: INTERNAL MEDICINE

## 2023-12-18 PROCEDURE — 63600175 PHARM REV CODE 636 W HCPCS: Performed by: INTERNAL MEDICINE

## 2023-12-18 PROCEDURE — 93458 L HRT ARTERY/VENTRICLE ANGIO: CPT | Performed by: INTERNAL MEDICINE

## 2023-12-18 PROCEDURE — 25000003 PHARM REV CODE 250: Performed by: INTERNAL MEDICINE

## 2023-12-18 PROCEDURE — 25500020 PHARM REV CODE 255: Performed by: INTERNAL MEDICINE

## 2023-12-18 PROCEDURE — C1769 GUIDE WIRE: HCPCS | Performed by: INTERNAL MEDICINE

## 2023-12-18 RX ORDER — HYDROCODONE BITARTRATE AND ACETAMINOPHEN 5; 325 MG/1; MG/1
1 TABLET ORAL EVERY 4 HOURS PRN
Status: DISCONTINUED | OUTPATIENT
Start: 2023-12-18 | End: 2023-12-18 | Stop reason: HOSPADM

## 2023-12-18 RX ORDER — CLOPIDOGREL BISULFATE 75 MG/1
75 TABLET ORAL ONCE
Status: COMPLETED | OUTPATIENT
Start: 2023-12-18 | End: 2023-12-18

## 2023-12-18 RX ORDER — HEPARIN SODIUM 1000 [USP'U]/ML
INJECTION, SOLUTION INTRAVENOUS; SUBCUTANEOUS
Status: DISCONTINUED | OUTPATIENT
Start: 2023-12-18 | End: 2023-12-18 | Stop reason: HOSPADM

## 2023-12-18 RX ORDER — MORPHINE SULFATE 4 MG/ML
2 INJECTION, SOLUTION INTRAMUSCULAR; INTRAVENOUS EVERY 4 HOURS PRN
Status: DISCONTINUED | OUTPATIENT
Start: 2023-12-18 | End: 2023-12-18 | Stop reason: HOSPADM

## 2023-12-18 RX ORDER — LIDOCAINE HYDROCHLORIDE 10 MG/ML
INJECTION, SOLUTION EPIDURAL; INFILTRATION; INTRACAUDAL; PERINEURAL
Status: DISCONTINUED | OUTPATIENT
Start: 2023-12-18 | End: 2023-12-18 | Stop reason: HOSPADM

## 2023-12-18 RX ORDER — ACETAMINOPHEN 325 MG/1
650 TABLET ORAL EVERY 4 HOURS PRN
Status: DISCONTINUED | OUTPATIENT
Start: 2023-12-18 | End: 2023-12-18 | Stop reason: HOSPADM

## 2023-12-18 RX ORDER — DIPHENHYDRAMINE HCL 25 MG
50 CAPSULE ORAL
Status: DISCONTINUED | OUTPATIENT
Start: 2023-12-18 | End: 2023-12-18 | Stop reason: HOSPADM

## 2023-12-18 RX ORDER — HYDRALAZINE HYDROCHLORIDE 20 MG/ML
10 INJECTION INTRAMUSCULAR; INTRAVENOUS EVERY 4 HOURS PRN
Status: DISCONTINUED | OUTPATIENT
Start: 2023-12-18 | End: 2023-12-18 | Stop reason: HOSPADM

## 2023-12-18 RX ORDER — MIDAZOLAM HYDROCHLORIDE 1 MG/ML
INJECTION, SOLUTION INTRAMUSCULAR; INTRAVENOUS
Status: DISCONTINUED | OUTPATIENT
Start: 2023-12-18 | End: 2023-12-18 | Stop reason: HOSPADM

## 2023-12-18 RX ORDER — SODIUM CHLORIDE 9 MG/ML
INJECTION, SOLUTION INTRAVENOUS ONCE
Status: COMPLETED | OUTPATIENT
Start: 2023-12-18 | End: 2023-12-18

## 2023-12-18 RX ORDER — ASPIRIN 81 MG/1
81 TABLET ORAL ONCE
Status: COMPLETED | OUTPATIENT
Start: 2023-12-18 | End: 2023-12-18

## 2023-12-18 RX ORDER — ONDANSETRON 4 MG/1
8 TABLET, ORALLY DISINTEGRATING ORAL EVERY 8 HOURS PRN
Status: DISCONTINUED | OUTPATIENT
Start: 2023-12-18 | End: 2023-12-18 | Stop reason: HOSPADM

## 2023-12-18 RX ORDER — NITROGLYCERIN 20 MG/100ML
INJECTION INTRAVENOUS
Status: DISCONTINUED | OUTPATIENT
Start: 2023-12-18 | End: 2023-12-18 | Stop reason: HOSPADM

## 2023-12-18 RX ORDER — SODIUM CHLORIDE 0.9 % (FLUSH) 0.9 %
10 SYRINGE (ML) INJECTION
Status: DISCONTINUED | OUTPATIENT
Start: 2023-12-18 | End: 2023-12-18 | Stop reason: HOSPADM

## 2023-12-18 RX ORDER — DIAZEPAM 5 MG/1
10 TABLET ORAL
Status: DISCONTINUED | OUTPATIENT
Start: 2023-12-18 | End: 2023-12-18 | Stop reason: HOSPADM

## 2023-12-18 RX ORDER — FENTANYL CITRATE 50 UG/ML
INJECTION, SOLUTION INTRAMUSCULAR; INTRAVENOUS
Status: DISCONTINUED | OUTPATIENT
Start: 2023-12-18 | End: 2023-12-18 | Stop reason: HOSPADM

## 2023-12-18 RX ORDER — SODIUM CHLORIDE 9 MG/ML
INJECTION, SOLUTION INTRAVENOUS CONTINUOUS
Status: DISCONTINUED | OUTPATIENT
Start: 2023-12-18 | End: 2023-12-18 | Stop reason: HOSPADM

## 2023-12-18 RX ADMIN — SODIUM CHLORIDE: 9 INJECTION, SOLUTION INTRAVENOUS at 10:12

## 2023-12-18 RX ADMIN — CLOPIDOGREL BISULFATE 75 MG: 75 TABLET ORAL at 10:12

## 2023-12-18 RX ADMIN — ASPIRIN 81 MG: 81 TABLET, COATED ORAL at 10:12

## 2023-12-18 RX ADMIN — DIPHENHYDRAMINE HYDROCHLORIDE 50 MG: 25 CAPSULE ORAL at 10:12

## 2023-12-18 RX ADMIN — DIAZEPAM 10 MG: 5 TABLET ORAL at 10:12

## 2023-12-18 RX ADMIN — SODIUM CHLORIDE: 9 INJECTION, SOLUTION INTRAVENOUS at 01:12

## 2023-12-19 ENCOUNTER — PATIENT MESSAGE (OUTPATIENT)
Dept: ADMINISTRATIVE | Facility: OTHER | Age: 83
End: 2023-12-19
Payer: OTHER GOVERNMENT

## 2023-12-19 VITALS
HEART RATE: 49 BPM | WEIGHT: 189.63 LBS | BODY MASS INDEX: 26.55 KG/M2 | RESPIRATION RATE: 15 BRPM | TEMPERATURE: 98 F | SYSTOLIC BLOOD PRESSURE: 139 MMHG | HEIGHT: 71 IN | DIASTOLIC BLOOD PRESSURE: 63 MMHG | OXYGEN SATURATION: 99 %

## 2023-12-20 ENCOUNTER — PATIENT MESSAGE (OUTPATIENT)
Dept: ADMINISTRATIVE | Facility: OTHER | Age: 83
End: 2023-12-20
Payer: OTHER GOVERNMENT

## 2023-12-25 NOTE — Clinical Note
Patient c/o not feeling well for last week. Took some ATB that she had at home. Done with ATB. Lower back pain and general not feeling well. Had diarrhea Thursday.    The groin and right radial was prepped. The site was prepped with ChloraPrep. The patient was draped.

## 2023-12-29 ENCOUNTER — LAB VISIT (OUTPATIENT)
Dept: LAB | Facility: HOSPITAL | Age: 83
End: 2023-12-29
Attending: INTERNAL MEDICINE
Payer: MEDICARE

## 2023-12-29 DIAGNOSIS — I10 ESSENTIAL HYPERTENSION, MALIGNANT: Primary | ICD-10-CM

## 2023-12-29 LAB
ANION GAP SERPL CALC-SCNC: 5 MEQ/L
BUN SERPL-MCNC: 14.1 MG/DL (ref 8.4–25.7)
CALCIUM SERPL-MCNC: 9.4 MG/DL (ref 8.8–10)
CHLORIDE SERPL-SCNC: 103 MMOL/L (ref 98–107)
CO2 SERPL-SCNC: 28 MMOL/L (ref 23–31)
CREAT SERPL-MCNC: 1.4 MG/DL (ref 0.73–1.18)
CREAT/UREA NIT SERPL: 10
GFR SERPLBLD CREATININE-BSD FMLA CKD-EPI: 50 MLS/MIN/1.73/M2
GLUCOSE SERPL-MCNC: 100 MG/DL (ref 82–115)
POTASSIUM SERPL-SCNC: 6.5 MMOL/L (ref 3.5–5.1)
SODIUM SERPL-SCNC: 136 MMOL/L (ref 136–145)

## 2023-12-29 PROCEDURE — 36415 COLL VENOUS BLD VENIPUNCTURE: CPT

## 2023-12-29 PROCEDURE — 80048 BASIC METABOLIC PNL TOTAL CA: CPT

## 2024-01-01 ENCOUNTER — LAB VISIT (OUTPATIENT)
Dept: LAB | Facility: HOSPITAL | Age: 84
End: 2024-01-01
Attending: NURSE PRACTITIONER
Payer: OTHER GOVERNMENT

## 2024-01-01 DIAGNOSIS — E87.5 HYPERPOTASSEMIA: Primary | ICD-10-CM

## 2024-01-01 LAB — POTASSIUM SERPL-SCNC: 4.6 MMOL/L (ref 3.5–5.1)

## 2024-01-01 PROCEDURE — 36415 COLL VENOUS BLD VENIPUNCTURE: CPT

## 2024-01-01 PROCEDURE — 84132 ASSAY OF SERUM POTASSIUM: CPT

## 2024-01-08 ENCOUNTER — LAB VISIT (OUTPATIENT)
Dept: LAB | Facility: HOSPITAL | Age: 84
End: 2024-01-08
Attending: INTERNAL MEDICINE
Payer: MEDICARE

## 2024-01-08 DIAGNOSIS — E87.5 HYPERPOTASSEMIA: ICD-10-CM

## 2024-01-08 DIAGNOSIS — I10 ESSENTIAL HYPERTENSION, MALIGNANT: Primary | ICD-10-CM

## 2024-01-08 LAB — POTASSIUM SERPL-SCNC: 6.2 MMOL/L (ref 3.5–5.1)

## 2024-01-08 PROCEDURE — 84132 ASSAY OF SERUM POTASSIUM: CPT

## 2024-01-08 PROCEDURE — 36415 COLL VENOUS BLD VENIPUNCTURE: CPT

## 2024-01-10 ENCOUNTER — PATIENT MESSAGE (OUTPATIENT)
Dept: CARDIOLOGY | Facility: CLINIC | Age: 84
End: 2024-01-10
Payer: OTHER GOVERNMENT

## 2024-01-31 ENCOUNTER — LAB VISIT (OUTPATIENT)
Dept: LAB | Facility: HOSPITAL | Age: 84
End: 2024-01-31
Attending: FAMILY MEDICINE
Payer: MEDICARE

## 2024-01-31 DIAGNOSIS — I25.10 CORONARY ARTERY DISEASE, UNSPECIFIED VESSEL OR LESION TYPE, UNSPECIFIED WHETHER ANGINA PRESENT, UNSPECIFIED WHETHER NATIVE OR TRANSPLANTED HEART: ICD-10-CM

## 2024-01-31 DIAGNOSIS — D64.9 ANEMIA, UNSPECIFIED TYPE: Primary | ICD-10-CM

## 2024-01-31 LAB
ERYTHROCYTE [DISTWIDTH] IN BLOOD BY AUTOMATED COUNT: 12.4 % (ref 11.5–17)
FERRITIN SERPL-MCNC: 85 NG/ML (ref 21.81–274.66)
FOLATE SERPL-MCNC: 14.9 NG/ML (ref 7–31.4)
HCT VFR BLD AUTO: 34.4 % (ref 42–52)
HGB BLD-MCNC: 11.8 G/DL (ref 14–18)
IRON SATN MFR SERPL: 23 % (ref 20–50)
IRON SERPL-MCNC: 65 UG/DL (ref 65–175)
MCH RBC QN AUTO: 31.8 PG (ref 27–31)
MCHC RBC AUTO-ENTMCNC: 34.3 G/DL (ref 33–36)
MCV RBC AUTO: 92.7 FL (ref 80–94)
PLATELET # BLD AUTO: 136 X10(3)/MCL (ref 130–400)
PMV BLD AUTO: 10.5 FL (ref 7.4–10.4)
RBC # BLD AUTO: 3.71 X10(6)/MCL (ref 4.7–6.1)
TIBC SERPL-MCNC: 213 UG/DL (ref 69–240)
TIBC SERPL-MCNC: 278 UG/DL (ref 250–450)
TRANSFERRIN SERPL-MCNC: 262 MG/DL
VIT B12 SERPL-MCNC: 419 PG/ML (ref 213–816)
WBC # SPEC AUTO: 5.7 X10(3)/MCL (ref 4.5–11.5)

## 2024-01-31 PROCEDURE — 83540 ASSAY OF IRON: CPT

## 2024-01-31 PROCEDURE — 36415 COLL VENOUS BLD VENIPUNCTURE: CPT

## 2024-01-31 PROCEDURE — 82728 ASSAY OF FERRITIN: CPT

## 2024-01-31 PROCEDURE — 82607 VITAMIN B-12: CPT

## 2024-01-31 PROCEDURE — 82746 ASSAY OF FOLIC ACID SERUM: CPT

## 2024-01-31 PROCEDURE — 85027 COMPLETE CBC AUTOMATED: CPT

## 2024-03-05 ENCOUNTER — DOCUMENT SCAN (OUTPATIENT)
Dept: PAIN MEDICINE | Facility: CLINIC | Age: 84
End: 2024-03-05
Payer: OTHER GOVERNMENT

## 2024-03-11 ENCOUNTER — LAB VISIT (OUTPATIENT)
Dept: LAB | Facility: HOSPITAL | Age: 84
End: 2024-03-11
Attending: FAMILY MEDICINE
Payer: OTHER GOVERNMENT

## 2024-03-11 DIAGNOSIS — D64.9 ANEMIA, UNSPECIFIED TYPE: Primary | ICD-10-CM

## 2024-03-11 LAB
BASOPHILS # BLD AUTO: 0.02 X10(3)/MCL
BASOPHILS NFR BLD AUTO: 0.5 %
EOSINOPHIL # BLD AUTO: 0.08 X10(3)/MCL (ref 0–0.9)
EOSINOPHIL NFR BLD AUTO: 2.1 %
ERYTHROCYTE [DISTWIDTH] IN BLOOD BY AUTOMATED COUNT: 12.5 % (ref 11.5–17)
HCT VFR BLD AUTO: 34 % (ref 42–52)
HGB BLD-MCNC: 11.5 G/DL (ref 14–18)
IMM GRANULOCYTES # BLD AUTO: 0.01 X10(3)/MCL (ref 0–0.04)
IMM GRANULOCYTES NFR BLD AUTO: 0.3 %
IRON SERPL-MCNC: 90 UG/DL (ref 65–175)
LYMPHOCYTES # BLD AUTO: 0.87 X10(3)/MCL (ref 0.6–4.6)
LYMPHOCYTES NFR BLD AUTO: 23 %
MAGNESIUM SERPL-MCNC: 2 MG/DL (ref 1.6–2.6)
MCH RBC QN AUTO: 32.6 PG (ref 27–31)
MCHC RBC AUTO-ENTMCNC: 33.8 G/DL (ref 33–36)
MCV RBC AUTO: 96.3 FL (ref 80–94)
MONOCYTES # BLD AUTO: 0.27 X10(3)/MCL (ref 0.1–1.3)
MONOCYTES NFR BLD AUTO: 7.1 %
NEUTROPHILS # BLD AUTO: 2.53 X10(3)/MCL (ref 2.1–9.2)
NEUTROPHILS NFR BLD AUTO: 67 %
PLATELET # BLD AUTO: 113 X10(3)/MCL (ref 130–400)
PMV BLD AUTO: 10 FL (ref 7.4–10.4)
RBC # BLD AUTO: 3.53 X10(6)/MCL (ref 4.7–6.1)
WBC # SPEC AUTO: 3.78 X10(3)/MCL (ref 4.5–11.5)

## 2024-03-11 PROCEDURE — 83735 ASSAY OF MAGNESIUM: CPT

## 2024-03-11 PROCEDURE — 85025 COMPLETE CBC W/AUTO DIFF WBC: CPT

## 2024-03-11 PROCEDURE — 36415 COLL VENOUS BLD VENIPUNCTURE: CPT

## 2024-03-11 PROCEDURE — 83540 ASSAY OF IRON: CPT

## 2024-04-16 ENCOUNTER — OFFICE VISIT (OUTPATIENT)
Dept: PAIN MEDICINE | Facility: CLINIC | Age: 84
End: 2024-04-16
Payer: OTHER GOVERNMENT

## 2024-04-16 VITALS
WEIGHT: 189 LBS | DIASTOLIC BLOOD PRESSURE: 90 MMHG | HEART RATE: 93 BPM | SYSTOLIC BLOOD PRESSURE: 148 MMHG | BODY MASS INDEX: 26.46 KG/M2 | HEIGHT: 71 IN

## 2024-04-16 DIAGNOSIS — M54.9 CHRONIC BACK PAIN GREATER THAN 3 MONTHS DURATION: Primary | ICD-10-CM

## 2024-04-16 DIAGNOSIS — M47.816 LUMBAR SPONDYLOSIS: ICD-10-CM

## 2024-04-16 DIAGNOSIS — M48.061 SPINAL STENOSIS OF LUMBAR REGION WITHOUT NEUROGENIC CLAUDICATION: ICD-10-CM

## 2024-04-16 DIAGNOSIS — G89.29 CHRONIC BACK PAIN GREATER THAN 3 MONTHS DURATION: Primary | ICD-10-CM

## 2024-04-16 PROCEDURE — 99203 OFFICE O/P NEW LOW 30 MIN: CPT | Mod: ,,, | Performed by: ANESTHESIOLOGY

## 2024-04-16 RX ORDER — TIZANIDINE HYDROCHLORIDE 4 MG/1
4 CAPSULE, GELATIN COATED ORAL 3 TIMES DAILY PRN
COMMUNITY

## 2024-04-16 RX ORDER — ACETAMINOPHEN 325 MG/1
TABLET ORAL
COMMUNITY

## 2024-04-16 RX ORDER — OMEPRAZOLE 40 MG/1
1 CAPSULE, DELAYED RELEASE ORAL EVERY MORNING
COMMUNITY
Start: 2023-11-28

## 2024-04-16 RX ORDER — DOCUSATE SODIUM 100 MG/1
200 CAPSULE, LIQUID FILLED ORAL
COMMUNITY
Start: 2024-01-17

## 2024-04-16 RX ORDER — LOSARTAN POTASSIUM AND HYDROCHLOROTHIAZIDE 12.5; 5 MG/1; MG/1
1 TABLET ORAL DAILY
COMMUNITY

## 2024-04-16 NOTE — PROGRESS NOTES
Mraía Monet MD        PATIENT NAME: Mikel Jewell  : 1940  DATE: 24  MRN: [id number]      Billing Provider: María Monet MD  Level of Service:   Patient PCP Information       Provider PCP Type    David Kim Jr, MD General            Reason for Visit / Chief Complaint: Low-back Pain (([facility]) intervertebral disc disorders w/radiculopathy lumbar.MRI and xrays in epic, C/O pain level 7, not taking pain meds.no prior injury or sx on back, states pain started when he fell a year ago. )       Update PCP  Update Chief Complaint         History of Present Illness / Problem Focused Workflow     Mikel Jewell presents to the clinic with Low-back Pain (([facility]) intervertebral disc disorders w/radiculopathy lumbar.MRI and xrays in epic, C/O pain level 7, not taking pain meds.no prior injury or sx on back, states pain started when he fell a year ago. )     This is an 83-year-old male who presents to clinic today [facility].  He complains of right-sided low back pain that radiates into the hip and buttock that began after he fell last year.  He states that he slipped and fell on the concrete.  He saw Dr. Montalvo and was referred to physical therapy but states it did not help.  He was then referred to Dr. Richard in [place], who sent him to the hospital and he underwent a lumbar injection that did not help.  He does not like to take many medications, but will take half of a tizanidine every now and then when the pain gets too severe.  He also uses topical hempvana which helps a little bit.  He has not undergone any previous lumbar spine surgery.  He currently rates the pain a 7/10 on the NRS.  His pain is worse when he has to wipe himself after using the bathroom.  His pain is not too bad once he gets into bed at night.    Low-back Pain  Associated symptoms include chest pain (f/u with cardiology).       Review of Systems     Review of Systems    Constitutional:  Positive for fatigue.   Cardiovascular:  Positive for chest pain (f/u with cardiology).   Genitourinary:  Positive for hematuria (h/o cystectomy).   Musculoskeletal:  Positive for back pain.   All other systems reviewed and are negative.       Medical / Social / Family History     Past Medical History:   Diagnosis Date    Bladder cancer     Coronary artery disease     GERD (gastroesophageal reflux disease)     Hypertension     Renal cancer     Testicular pain        Past Surgical History:   Procedure Laterality Date    ATHERECTOMY  12/1/2023    Procedure: Atherectomy;  Surgeon: Tj Lovell MD;  Location: [facility] CATH LAB;  Service: Cardiology;;    CHOLECYSTECTOMY      COLONOSCOPY      CORONARY ANGIOPLASTY WITH STENT PLACEMENT      ESOPHAGOGASTRODUODENOSCOPY      HERNIA REPAIR Bilateral     IVUS, CORONARY  12/1/2023    Procedure: IVUS, Coronary;  Surgeon: Tj Lovell MD;  Location: [facility] CATH LAB;  Service: Cardiology;;    LEFT HEART CATHETERIZATION N/A 12/1/2023    Procedure: Left heart cath;  Surgeon: Tj Lovell MD;  Location: [facility] CATH LAB;  Service: Cardiology;  Laterality: N/A;    LEFT HEART CATHETERIZATION Left 12/18/2023    Procedure: Left heart cath;  Surgeon: Dalia Li MD;  Location: [facility] CATH LAB;  Service: Cardiology;  Laterality: Left;  LHC    NEPHRECTOMY      PENILE PROSTHESIS IMPLANT      PERCUTANEOUS CORONARY INTERVENTION, ARTERY N/A 12/1/2023    Procedure: Percutaneous coronary intervention;  Surgeon: Tj Lovell MD;  Location: [facility] CATH LAB;  Service: Cardiology;  Laterality: N/A;    PROSTATE SURGERY  10/2022    Removal with bladder removal    Removal of bladder tumors      ROBOT-ASSISTED LAPAROSCOPIC CYSTECTOMY N/A 10/11/2022    Procedure: ROBOTIC CYSTECTOMY;  Surgeon: Flip Casas MD;  Location: [facility];  Service: Urology;  Laterality: N/A;  ROBOT ASSISTED CYSTECTOMY WITH URINARY DIVERSION //   ANY INDICATED PROCEDURES    ROBOT-ASSISTED LAPAROSCOPIC  PROSTATECTOMY USING DA RASHEED XI  10/11/2022    Procedure: XI ROBOTIC PROSTATECTOMY;  Surgeon: Flip Casas MD;  Location: [facility];  Service: Urology;;    SMALL INTESTINE SURGERY  10/2022    Ileistomy    TURBT, WITH BLUE LIGHT CYSTOSCOPY AND CYSVIEW      urostomy         Social History  Mr. Jewell  reports that he has quit smoking. His smoking use included cigarettes. He has a 22.5 pack-year smoking history. He has never used smokeless tobacco. He reports that he does not drink alcohol and does not use drugs.    Family History  Mr.'s Jewell family history includes Cancer in his brother; Depression in his mother; Diabetes in his mother.    Medications and Allergies     Medications  Current Outpatient Medications   Medication Sig Dispense Refill    amLODIPine (NORVASC) 2.5 MG tablet Take 1 tablet (2.5 mg total) by mouth once daily. 30 tablet 3    docusate sodium (COLACE) 100 MG capsule 200 mg.      losartan (COZAAR) 50 MG tablet Take 50 mg by mouth once daily.      omeprazole (PRILOSEC) 40 MG capsule Take 1 capsule by mouth every morning.      pantoprazole (PROTONIX) 40 MG tablet Take 1 tablet (40 mg total) by mouth once daily. 30 tablet 11    acetaminophen (TYLENOL) 325 MG tablet Take prn      aspirin (ECOTRIN) 81 MG EC tablet Take 1 tablet (81 mg total) by mouth once daily. 90 tablet 0    losartan-hydrochlorothiazide 50-12.5 mg (HYZAAR) 50-12.5 mg per tablet tablet      tiZANidine 4 mg Cap Take 4 mg by mouth 3 (three) times daily as needed.       No current facility-administered medications for this visit.       Allergies  Review of patient's allergies indicates:  No Known Allergies    Physical Examination     Vitals:    04/16/24 0821   BP: (!) 148/90   Pulse: 93     Pain Disability Index (PDI): 44       Spine Musculoskeletal Exam    Gait    Assistive device: cane    Inspection    Thoracolumbar    Thoracolumbar inspection is normal.    Palpation    Thoracolumbar    Tenderness: none    Right      Masses:  none      Spasms: none      Crepitus: none      Muscle tone: normal    Left      Masses: none      Spasms: none      Crepitus: none      Muscle tone: normal    Range of Motion    Thoracolumbar        Thoracolumbar range of motion additional comments: Limited range of motion in the lumbar spine due to pain.    Strength    Thoracolumbar    Thoracolumbar motor exam is normal.       Sensory    Thoracolumbar    Thoracolumbar sensation is normal.    General      Constitutional: appears stated age, well-developed and well-nourished    Scleral icterus: no    Labored breathing: no    Psychiatric: normal mood and affect and no acute distress    Neurological: alert and oriented x3    Skin: intact    Lymphadenopathy: none       Assessment and Plan (including Health Maintenance)      Problem List  Smart Sets  Document Outside HM   :    Plan:   Chronic back pain greater than 3 months duration  -     Ambulatory referral/consult to Physical/Occupational Therapy; Future; Expected date: 04/23/2024    Spinal stenosis of lumbar region without neurogenic claudication  -     Ambulatory referral/consult to Physical/Occupational Therapy; Future; Expected date: 04/23/2024    Lumbar spondylosis  -     Ambulatory referral/consult to Physical/Occupational Therapy; Future; Expected date: 04/23/2024           Problem List Items Addressed This Visit          Orthopedic Problems    Lumbar spondylosis    Relevant Orders    Ambulatory referral/consult to Physical/Occupational Therapy       Other    Chronic back pain greater than 3 months duration - Primary    Relevant Orders    Ambulatory referral/consult to Physical/Occupational Therapy    Spinal stenosis of lumbar region without neurogenic claudication    Relevant Orders    Ambulatory referral/consult to Physical/Occupational Therapy         Future Appointments   Date Time Provider Department Center   7/16/2024 10:15 AM María Monet MD [facility]        There are no Patient  Instructions on file for this visit.  No follow-ups on file.     Signature:  María Monet MD      Date of encounter: 4/16/24

## 2024-06-14 RX ORDER — HYDROCODONE BITARTRATE AND ACETAMINOPHEN 5; 325 MG/1; MG/1
1 TABLET ORAL EVERY 6 HOURS PRN
Status: ON HOLD | COMMUNITY
End: 2024-06-26 | Stop reason: HOSPADM

## 2024-06-17 ENCOUNTER — LAB VISIT (OUTPATIENT)
Dept: LAB | Facility: HOSPITAL | Age: 84
End: 2024-06-17
Attending: UROLOGY
Payer: OTHER GOVERNMENT

## 2024-06-17 DIAGNOSIS — E72.12: Primary | ICD-10-CM

## 2024-06-17 DIAGNOSIS — C64.2 MALIGNANT NEOPLASM OF LEFT KIDNEY, EXCEPT RENAL PELVIS: ICD-10-CM

## 2024-06-17 LAB
OHS QRS DURATION: 84 MS
OHS QTC CALCULATION: 377 MS

## 2024-06-17 PROCEDURE — 93005 ELECTROCARDIOGRAM TRACING: CPT

## 2024-06-17 PROCEDURE — 93010 ELECTROCARDIOGRAM REPORT: CPT | Mod: ,,, | Performed by: INTERNAL MEDICINE

## 2024-06-20 ENCOUNTER — ANESTHESIA EVENT (OUTPATIENT)
Dept: SURGERY | Facility: HOSPITAL | Age: 84
End: 2024-06-20
Payer: OTHER GOVERNMENT

## 2024-06-21 NOTE — CLINICAL REVIEW
outside labs reviewed. EKG reviewed. cardiology notes utd. Carotid/Echo/Angio noted. chart review complete. ccv

## 2024-06-23 ENCOUNTER — PATIENT MESSAGE (OUTPATIENT)
Dept: ADMINISTRATIVE | Facility: OTHER | Age: 84
End: 2024-06-23
Payer: OTHER GOVERNMENT

## 2024-06-24 ENCOUNTER — PATIENT MESSAGE (OUTPATIENT)
Dept: ADMINISTRATIVE | Facility: OTHER | Age: 84
End: 2024-06-24
Payer: OTHER GOVERNMENT

## 2024-06-25 ENCOUNTER — PATIENT MESSAGE (OUTPATIENT)
Dept: ADMINISTRATIVE | Facility: OTHER | Age: 84
End: 2024-06-25
Payer: OTHER GOVERNMENT

## 2024-06-25 NOTE — PRE-PROCEDURE INSTRUCTIONS
[facility]: Outpatient Surgery   Preprocedure Check-In Instructions       Your arrival time for your surgery or procedure is 11:00am.     We ask patients to arrive about 2 hours before surgery to allow for enough time to review your health history & medications, start your IV, complete any outstanding labwork or tests, and meet your Anesthesiologist.     You will arrive at [facility], [address] [place] [address]. Enter through the [place] entrance next to the Emergency Room, and come to the 6th floor to the Outpatient Surgery Department. If you need a wheelchair, please call [phone] for an attendant to meet you at the [place] entrance with a wheelchair.    Wait Times:  Due to inconsistent procedure completion times, an unexpected wait may occur.  The physicians would like you to be here in the event they run ahead of time.  We will keep you comfortable and informed while you are waiting.  We apologize in advance if this happens.    Visitory Policy:   You are allowed 2 adult visitors to be with you in the hospital. All hospital visitors should be in good current health. No small children.     What to Bring:   Please have your ID, insurance cards, and all home medication bottles with you at check in. Bring your CPAP machine if one is used at home.     Fasting:   Nothing to eat or drink after midnight the night before your procedure. This includes no ice, gum, hard candies, and/or tobacco products.   Follow your doctor's instructions for taking any medications on the morning of your procedure. If no instructions for taking medications were given, do not take any medications but bring your medications in their bottles to your procedure check in.     Follow your doctor's preoperative instructions regarding skin prep, bowel prep, bathing, or showering prior to your procedure. If any special soaps were provided to you, please use according to your doctor's instructions.  If no instructions were given from your doctor, take a good bath or shower with antibacterial soap the night before and the morning of your procedure. On the morning of procedure, wear loose, comfortable clothing. No lotions, makeup, perfumes, colognes, deodorant, or jewelry to your procedure. Removable items (glasses, contact lenses, dentures, retainers, hearing aids) need to be removed for your procedure. Bring your storage containers for these items if you wear them.     Artificial nails, body jewelry, eyelash extensions, and/or hair extensions with metal clips are not allowed during your surgery. If you currently wear any of these items, please arrange for them to be removed prior to your arrival to the hospital.     Outpatient or Same Day Surgeries:   Any patients receiving sedation/anesthesia are advised not to drive for 24 hours after their procedure. We do not allow patients to drive themselves home after discharge. If you are going home after your procedure, please have someone available to drive you home from the hospital.     You may call the Outpatient Surgery Department at [phone] with any questions or concerns. We are looking forward to meeting you and taking great care of you for your procedure. Thank you for choosing [facility] for your surgical needs.       Status: complete  Spoke with: patient  Call Time: [id number]

## 2024-06-26 ENCOUNTER — HOSPITAL ENCOUNTER (OUTPATIENT)
Facility: HOSPITAL | Age: 84
Discharge: HOME | End: 2024-06-26
Attending: UROLOGY | Admitting: UROLOGY
Payer: OTHER GOVERNMENT

## 2024-06-26 ENCOUNTER — ANESTHESIA (OUTPATIENT)
Dept: SURGERY | Facility: HOSPITAL | Age: 84
End: 2024-06-26
Payer: OTHER GOVERNMENT

## 2024-06-26 DIAGNOSIS — C68.0 URETHRAL CANCER: Primary | ICD-10-CM

## 2024-06-26 DIAGNOSIS — C67.0: ICD-10-CM

## 2024-06-26 LAB
GROUP & RH: NORMAL
INDIRECT COOMBS: NORMAL
SPECIMEN OUTDATE: NORMAL

## 2024-06-26 PROCEDURE — 37000008 HC ANESTHESIA 1ST 15 MINUTES: Performed by: UROLOGY

## 2024-06-26 PROCEDURE — 27201423 OPTIME MED/SURG SUP & DEVICES STERILE SUPPLY: Performed by: UROLOGY

## 2024-06-26 PROCEDURE — 63600175 PHARM REV CODE 636 W HCPCS

## 2024-06-26 PROCEDURE — 71000015 HC POSTOP RECOV 1ST HR: Performed by: UROLOGY

## 2024-06-26 PROCEDURE — 86900 BLOOD TYPING SEROLOGIC ABO: CPT | Performed by: UROLOGY

## 2024-06-26 PROCEDURE — 63600175 PHARM REV CODE 636 W HCPCS: Performed by: STUDENT IN AN ORGANIZED HEALTH CARE EDUCATION/TRAINING PROGRAM

## 2024-06-26 PROCEDURE — 71000016 HC POSTOP RECOV ADDL HR: Performed by: UROLOGY

## 2024-06-26 PROCEDURE — 71000033 HC RECOVERY, INTIAL HOUR: Performed by: UROLOGY

## 2024-06-26 PROCEDURE — 63600175 PHARM REV CODE 636 W HCPCS: Performed by: UROLOGY

## 2024-06-26 PROCEDURE — 86850 RBC ANTIBODY SCREEN: CPT | Performed by: UROLOGY

## 2024-06-26 PROCEDURE — 36000709 HC OR TIME LEV III EA ADD 15 MIN: Performed by: UROLOGY

## 2024-06-26 PROCEDURE — 71000039 HC RECOVERY, EACH ADD'L HOUR: Performed by: UROLOGY

## 2024-06-26 PROCEDURE — 25000003 PHARM REV CODE 250

## 2024-06-26 PROCEDURE — 37000009 HC ANESTHESIA EA ADD 15 MINS: Performed by: UROLOGY

## 2024-06-26 PROCEDURE — 36000708 HC OR TIME LEV III 1ST 15 MIN: Performed by: UROLOGY

## 2024-06-26 PROCEDURE — 86923 COMPATIBILITY TEST ELECTRIC: CPT | Mod: 91 | Performed by: UROLOGY

## 2024-06-26 RX ORDER — FENTANYL CITRATE 50 UG/ML
INJECTION, SOLUTION INTRAMUSCULAR; INTRAVENOUS
Status: DISCONTINUED | OUTPATIENT
Start: 2024-06-26 | End: 2024-06-26

## 2024-06-26 RX ORDER — LIDOCAINE HYDROCHLORIDE 20 MG/ML
INJECTION, SOLUTION EPIDURAL; INFILTRATION; INTRACAUDAL; PERINEURAL
Status: DISCONTINUED | OUTPATIENT
Start: 2024-06-26 | End: 2024-06-26

## 2024-06-26 RX ORDER — EPHEDRINE SULFATE 50 MG/ML
INJECTION, SOLUTION INTRAVENOUS
Status: DISCONTINUED | OUTPATIENT
Start: 2024-06-26 | End: 2024-06-26

## 2024-06-26 RX ORDER — ONDANSETRON HYDROCHLORIDE 2 MG/ML
4 INJECTION, SOLUTION INTRAVENOUS DAILY PRN
Status: DISCONTINUED | OUTPATIENT
Start: 2024-06-26 | End: 2024-06-26 | Stop reason: HOSPADM

## 2024-06-26 RX ORDER — ONDANSETRON HYDROCHLORIDE 2 MG/ML
INJECTION, SOLUTION INTRAVENOUS
Status: DISCONTINUED | OUTPATIENT
Start: 2024-06-26 | End: 2024-06-26

## 2024-06-26 RX ORDER — HYDROMORPHONE HYDROCHLORIDE 2 MG/ML
0.2 INJECTION, SOLUTION INTRAMUSCULAR; INTRAVENOUS; SUBCUTANEOUS EVERY 5 MIN PRN
Status: COMPLETED | OUTPATIENT
Start: 2024-06-26 | End: 2024-06-26

## 2024-06-26 RX ORDER — POLYETHYLENE GLYCOL 3350 17 G/17G
17 POWDER, FOR SOLUTION ORAL DAILY
Qty: 238 G | Refills: 0 | Status: SHIPPED | OUTPATIENT
Start: 2024-06-26 | End: 2024-07-10

## 2024-06-26 RX ORDER — AMOXICILLIN AND CLAVULANATE POTASSIUM 875; 125 MG/1; MG/1
1 TABLET, FILM COATED ORAL 2 TIMES DAILY
Qty: 14 TABLET | Refills: 0 | Status: SHIPPED | OUTPATIENT
Start: 2024-06-26 | End: 2024-07-03

## 2024-06-26 RX ORDER — HYDROCODONE BITARTRATE AND ACETAMINOPHEN 5; 325 MG/1; MG/1
1 TABLET ORAL EVERY 6 HOURS PRN
Qty: 28 TABLET | Refills: 0 | Status: SHIPPED | OUTPATIENT
Start: 2024-06-26 | End: 2024-07-03

## 2024-06-26 RX ORDER — CEFAZOLIN SODIUM 2 G/50ML
2 SOLUTION INTRAVENOUS ONCE
Status: COMPLETED | OUTPATIENT
Start: 2024-06-26 | End: 2024-06-26

## 2024-06-26 RX ORDER — HYDROCODONE BITARTRATE AND ACETAMINOPHEN 500; 5 MG/1; MG/1
TABLET ORAL
Status: DISCONTINUED | OUTPATIENT
Start: 2024-06-26 | End: 2024-06-26 | Stop reason: HOSPADM

## 2024-06-26 RX ORDER — SODIUM CHLORIDE 0.9 % (FLUSH) 0.9 %
10 SYRINGE (ML) INJECTION
Status: DISCONTINUED | OUTPATIENT
Start: 2024-06-26 | End: 2024-06-26 | Stop reason: HOSPADM

## 2024-06-26 RX ORDER — PROPOFOL 10 MG/ML
INJECTION, EMULSION INTRAVENOUS
Status: DISCONTINUED | OUTPATIENT
Start: 2024-06-26 | End: 2024-06-26

## 2024-06-26 RX ORDER — VANCOMYCIN HYDROCHLORIDE 1 G/20ML
INJECTION, POWDER, LYOPHILIZED, FOR SOLUTION INTRAVENOUS
Status: DISCONTINUED | OUTPATIENT
Start: 2024-06-26 | End: 2024-06-26 | Stop reason: HOSPADM

## 2024-06-26 RX ORDER — DEXAMETHASONE SODIUM PHOSPHATE 4 MG/ML
INJECTION, SOLUTION INTRA-ARTICULAR; INTRALESIONAL; INTRAMUSCULAR; INTRAVENOUS; SOFT TISSUE
Status: DISCONTINUED | OUTPATIENT
Start: 2024-06-26 | End: 2024-06-26

## 2024-06-26 RX ADMIN — HYDROMORPHONE HYDROCHLORIDE 0.2 MG: 2 INJECTION, SOLUTION INTRAMUSCULAR; INTRAVENOUS; SUBCUTANEOUS at 03:06

## 2024-06-26 RX ADMIN — HYDROMORPHONE HYDROCHLORIDE 0.2 MG: 2 INJECTION, SOLUTION INTRAMUSCULAR; INTRAVENOUS; SUBCUTANEOUS at 04:06

## 2024-06-26 RX ADMIN — DEXAMETHASONE SODIUM PHOSPHATE 4 MG: 4 INJECTION, SOLUTION INTRA-ARTICULAR; INTRALESIONAL; INTRAMUSCULAR; INTRAVENOUS; SOFT TISSUE at 01:06

## 2024-06-26 RX ADMIN — FENTANYL CITRATE 50 MCG: 50 INJECTION, SOLUTION INTRAMUSCULAR; INTRAVENOUS at 02:06

## 2024-06-26 RX ADMIN — ONDANSETRON 4 MG: 2 INJECTION INTRAMUSCULAR; INTRAVENOUS at 02:06

## 2024-06-26 RX ADMIN — PROPOFOL 50 MG: 10 INJECTION, EMULSION INTRAVENOUS at 01:06

## 2024-06-26 RX ADMIN — SODIUM CHLORIDE, SODIUM GLUCONATE, SODIUM ACETATE, POTASSIUM CHLORIDE AND MAGNESIUM CHLORIDE: 526; 502; 368; 37; 30 INJECTION, SOLUTION INTRAVENOUS at 02:06

## 2024-06-26 RX ADMIN — FENTANYL CITRATE 50 MCG: 50 INJECTION, SOLUTION INTRAMUSCULAR; INTRAVENOUS at 01:06

## 2024-06-26 RX ADMIN — SODIUM CHLORIDE, SODIUM GLUCONATE, SODIUM ACETATE, POTASSIUM CHLORIDE AND MAGNESIUM CHLORIDE: 526; 502; 368; 37; 30 INJECTION, SOLUTION INTRAVENOUS at 12:06

## 2024-06-26 RX ADMIN — CEFAZOLIN SODIUM 2 G: 2 SOLUTION INTRAVENOUS at 01:06

## 2024-06-26 RX ADMIN — EPHEDRINE SULFATE 25 MG: 50 INJECTION INTRAVENOUS at 02:06

## 2024-06-26 RX ADMIN — PROPOFOL 30 MG: 10 INJECTION, EMULSION INTRAVENOUS at 01:06

## 2024-06-26 RX ADMIN — PROPOFOL 100 MG: 10 INJECTION, EMULSION INTRAVENOUS at 01:06

## 2024-06-26 RX ADMIN — LIDOCAINE HYDROCHLORIDE 40 MG: 20 INJECTION, SOLUTION INTRAVENOUS at 01:06

## 2024-06-26 NOTE — ANESTHESIA PREPROCEDURE EVALUATION
06/26/2024  Mikel Jewell is a 83 y.o., male.      Pre-op Assessment    I have reviewed the Patient Summary Reports.     I have reviewed the Nursing Notes. I have reviewed the NPO Status.   I have reviewed the Medications.     Review of Systems  Anesthesia Hx:  No problems with previous Anesthesia                Cardiovascular:  Exercise tolerance: good   Hypertension   CAD   CABG/stent                                     Renal/:  Chronic Renal Disease                Hepatic/GI:     GERD             Musculoskeletal:  Arthritis                   Physical Exam  General: Well nourished and Cooperative    Airway:  Mallampati: II   Mouth Opening: Normal  TM Distance: Normal  Tongue: Normal  Neck ROM: Normal ROM    Chest/Lungs:  Clear to auscultation        Anesthesia Plan  Type of Anesthesia, risks & benefits discussed:    Anesthesia Type: Gen Supraglottic Airway  Intra-op Monitoring Plan: Standard ASA Monitors  Post Op Pain Control Plan: multimodal analgesia  Induction:  IV  Informed Consent: Informed consent signed with the Patient and all parties understand the risks and agree with anesthesia plan.  All questions answered.   ASA Score: 3  Day of Surgery Review of History & Physical: H&P Update referred to the surgeon/provider.    Ready For Surgery From Anesthesia Perspective.     .    I explained anesthesia plan to patient/responsbile party if available.  Anesthesia consent done going over the material facts, risks, complications & alternatives, obtained which includes the possibility of altering the anesthesia plan.  I reviewed problem list, prior to admission medication list, appropriate labs, any workup, Xray, EKG etc noted below.  Patients condition is satisfactory to proceed with anesthesia plan unless otherwise noted (see anesthesia chart for details of the anesthesia plan carried out).   "    Pre-operative evaluation for Procedure(s) (LRB):  URETHRECTOMY (N/A)    Ht 5' 11" (1.803 m)   Wt 86.2 kg (190 lb)   BMI 26.50 kg/m²     Patient Active Problem List   Diagnosis    CAD (coronary artery disease)    Chest pain    Chronic back pain greater than 3 months duration    Spinal stenosis of lumbar region without neurogenic claudication    Lumbar spondylosis       Review of patient's allergies indicates:  No Known Allergies    Current Outpatient Medications   Medication Instructions    acetaminophen (TYLENOL) 325 MG tablet Take prn    amLODIPine (NORVASC) 2.5 mg, Oral, Daily    aspirin (ECOTRIN) 81 mg, Oral, Daily    docusate sodium (COLACE) 200 mg    HYDROcodone-acetaminophen (NORCO) 5-325 mg per tablet 1 tablet, Oral, Every 6 hours PRN    losartan (COZAAR) 50 mg, Oral, Daily    losartan-hydrochlorothiazide 50-12.5 mg (HYZAAR) 50-12.5 mg per tablet 1 tablet, Oral, Daily    omeprazole (PRILOSEC) 40 MG capsule 1 capsule, Oral, Every morning    pantoprazole (PROTONIX) 40 mg, Oral, Daily    tiZANidine 4 mg, Oral, 3 times daily PRN       Past Surgical History:   Procedure Laterality Date    ATHERECTOMY  12/01/2023    Procedure: Atherectomy;  Surgeon: Tj Lovell MD;  Location: [facility] CATH LAB;  Service: Cardiology;;    CHOLECYSTECTOMY      COLONOSCOPY      CORONARY ANGIOPLASTY WITH STENT PLACEMENT      ESOPHAGOGASTRODUODENOSCOPY      HERNIA REPAIR Bilateral     IVUS, CORONARY  12/01/2023    Procedure: IVUS, Coronary;  Surgeon: Tj Lovell MD;  Location: [facility] CATH LAB;  Service: Cardiology;;    LEFT HEART CATHETERIZATION N/A 12/01/2023    Procedure: Left heart cath;  Surgeon: Tj Lovell MD;  Location: [facility] CATH LAB;  Service: Cardiology;  Laterality: N/A;    LEFT HEART CATHETERIZATION Left 12/18/2023    Procedure: Left heart cath;  Surgeon: Dalia Li MD;  Location: [facility] CATH LAB;  Service: Cardiology;  Laterality: Left;  LHC    NEPHRECTOMY Left     PENILE PROSTHESIS IMPLANT      " "PERCUTANEOUS CORONARY INTERVENTION, ARTERY N/A 12/01/2023    Procedure: Percutaneous coronary intervention;  Surgeon: Tj Lovell MD;  Location: [facility] CATH LAB;  Service: Cardiology;  Laterality: N/A;    PROSTATE SURGERY  10/2022    Removal with bladder removal    Removal of bladder tumors      ROBOT-ASSISTED LAPAROSCOPIC CYSTECTOMY N/A 10/11/2022    Procedure: ROBOTIC CYSTECTOMY;  Surgeon: Flip Casas MD;  Location: [facility] OR;  Service: Urology;  Laterality: N/A;  ROBOT ASSISTED CYSTECTOMY WITH URINARY DIVERSION //   ANY INDICATED PROCEDURES    ROBOT-ASSISTED LAPAROSCOPIC PROSTATECTOMY USING DA RASHEED XI  10/11/2022    Procedure: XI ROBOTIC PROSTATECTOMY;  Surgeon: Flip Casas MD;  Location: [facility] OR;  Service: Urology;;    SMALL INTESTINE SURGERY  10/2022    Ileistomy    TURBT, WITH BLUE LIGHT CYSTOSCOPY AND CYSVIEW      urostomy         Social History     Socioeconomic History    Marital status:    Tobacco Use    Smoking status: Former     Current packs/day: 1.50     Average packs/day: 1.5 packs/day for 15.0 years (22.5 ttl pk-yrs)     Types: Cigarettes    Smokeless tobacco: Never    Tobacco comments:     Dont remember dates. - appx dates 8246-4869   Substance and Sexual Activity    Alcohol use: Never    Drug use: Never    Sexual activity: Yes     Partners: Female     Birth control/protection: Post-menopausal       Lab Results   Component Value Date    WBC 3.78 (L) 03/11/2024    HGB 11.5 (L) 03/11/2024    HCT 34.0 (L) 03/11/2024    MCV 96.3 (H) 03/11/2024     (L) 03/11/2024          BMP  Lab Results   Component Value Date    HCT 34.0 (L) 03/11/2024     04/15/2024    K 4.7 04/15/2024    BUN 22 04/15/2024    CREATININE 1.41 (H) 04/15/2024    CALCIUM 9.0 04/15/2024        INR  No results for input(s): "PT", "INR", "PROTIME", "APTT" in the last 72 hours.        Diagnostic Studies:      EKG:  Results for orders placed or performed in visit on 06/17/24   EKG 12-lead    Collection Time: " 06/17/24 11:09 AM   Result Value Ref Range    QRS Duration 84 ms    [facility] QTC Calculation 377 ms    Narrative    Test Reason : PRE OP    Vent. Rate : 057 BPM     Atrial Rate : 057 BPM     P-R Int : 166 ms          QRS Dur : 084 ms      QT Int : 388 ms       P-R-T Axes : 037 033 043 degrees     QTc Int : 377 ms    Sinus bradycardia  Otherwise normal ECG  When compared with ECG of 08-DEC-2023 09:35,  No significant change was found  Confirmed by West Davidson MD ([id number]) on 6/17/2024 1:28:42 PM    Referred By: HIREN LITTLE           Confirmed By:West Dobbins MD

## 2024-06-26 NOTE — DISCHARGE INSTRUCTIONS
NO DRIVING AND NO ALCOHOL consumption FOR 24 HOURS or while taking narcotic pain medications.    OK to shower afterwards. Do NOT submerge incision under water. Do not apply lotions, creams, or ointments.     NO heavy lifting. DO NOT lift objects greater than 10 lbs. Your doctor will advise at your follow up appointment when to resume normal activity.     Monitor for infection: chills, fever (100.4 F), redness or drainage at surgical site. Notify your doctor if any of these occur.     Report to your nearest ER if you experience any SUDDEN/SEVERE abdominal pain, trouble breathing, uncontrolled pain, profound weakness.      BLEEDING: if you experience uncontrollable bleeding, contact your doctor and go to ER.    NAUSEA: due to the anesthesia, you may experience nausea for up to 24 hours. If nausea and vomiting last longer, contact your doctor.     INFECTION:  watch for any signs or symptoms of infection such as chills, fever, redness or drainage at surgical site. Notify your doctor.     PAIN : take your pain medications as directed. If the pain medications are not helping, notify your doctor.

## 2024-06-26 NOTE — TRANSFER OF CARE
"Anesthesia Transfer of Care Note    Patient: Mikel Jewell    Procedure(s) Performed: Procedure(s) (LRB):  URETHRECTOMY (N/A)    Patient location: PACU    Anesthesia Type: general    Transport from OR: Transported from OR on room air with adequate spontaneous ventilation    Post pain: adequate analgesia    Post assessment: no apparent anesthetic complications and tolerated procedure well    Post vital signs: stable    Level of consciousness: sedated    Nausea/Vomiting: no nausea/vomiting    Complications: none    Transfer of care protocol was followed      Last vitals: Visit Vitals  BP (!) 154/73   Pulse 61   Temp 36.3 °C (97.4 °F) (Oral)   Resp 17   Ht 5' 11" (1.803 m)   Wt 87 kg (191 lb 12.8 oz)   SpO2 98%   BMI 26.75 kg/m²     "

## 2024-06-26 NOTE — OP NOTE
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  MALE URETHRECTOMY    Assisting Surgeon: YANIRA Casas    Estimated Blood Loss (EBL): * No values recorded between 2024  1:36 PM and 2024  3:22 PM *           Anesthesia: General    Complications:  None    History of Clinical Illness:  Pleasant 83-year-old man prior history of nephroureterectomy for upper tract urothelial carcinoma.  Subsequently received a cystectomy for recurrent bladder cancer.  Was found to have recurrence in his bulbar urethra and was consented for total urethrectomy.  Risks benefits rationale were discussed and consents were signed    Operative note:  After informed consent was obtained including the risks and benefits of the procedure patient was transported to the operating theater placed in the supine position on the operating table.  Once general endotracheal anesthesia was initiated patient was put in the high dorsal lithotomy position with all bony surfaces appropriately padded and positioned.  He was shaved prepped and draped in standard sterile fashion.  Did receive preoperative antibiotics and a timeout was undertaken to assure the proper patient and procedure.    Incision was made in the perineum carried down through the subcutaneous tissue exposing the bulbocavernosus muscle.  We opened the bulbocavernosus muscle and  identify the urethra.  I was able to introduced a sound per the meatus down the urethra for identification of the urethra.  We slowly dissected the urethra from surrounding soft tissue structures.  Eventually I was able to get a right angle behind the urethra and then get a Penrose drain behind the urethra for retraction.  We carried that resection distally and proximally.  Proximally we dissected the urethra all the way back to the urogenital diaphragm taking care to maintain the integrity of surrounding structures.  Cautery and clips were used to establish hemostasis in the risks and surrounding tissues.  Eventually we were able to get all the way down to the pelvic floor musculature and resected the proximal portion of the urethra.  We then carried dissection distally.  We identified but stayed clear of the tubing to his prosthesis.  Because of his prior prosthesis which was in place we were unable to invaginate the penis for urethrectomy.  Therefore we made a counter incision over and he ventral portion of the mid shaft of the penis.  We dissected down and exposed urethra and then continued dissecting urethral all the way towards the meatus.  We created a wedge resection out of the urethral meatus and this allowed us to completely resect the urethra which was passed off the field and sent to the pathologist for permanent sectioning.  We copiously irrigated the wounds with vancomycin irrigation.  Tubing and prosthesis were in place and had not been violated.  We used cauterization and clips for hemostasis and the perineum.  We reapproximated some of the soft tissue structures in the perineum and placed FloSeal in the perineum for hemostatic control.  In the perineum I closed the deep subcutaneous tissue with a running 2-0 Vicryl and the skin with a running 4-0 Monocryl.  Dermabond was placed over the incision.  In similar fashion we closed the skin of the penile shaft with a running 4-0 Monocryl.  Dermabond was  also placed over this incision.  Finally we closed the meatus with 4-0 Monocryl mattress sutures.  Needle lap and instrument counts were correct at the end of the case.  Specimen was sent to the pathologist for permanent sectioning.  No bleeding or bruising were seen.  Pressure dressings were placed and mesh panties.  Patient was awoke from anesthesia and transported to recovery room in stable condition.    Follow up plan:  Transferred to recovery then home when criteria met.  Prescriptions will be sent home.  Instructions will be given.    Operative Findings (including complications, if any):  Total urethrectomy without difficulty.           Implants: * No implants in log *    Specimens:   Specimen (24h ago, onward)       Start     Ordered    06/26/24 4153  Specimen to Pathology  RELEASE UPON ORDERING        References:    Click here for ordering Quick Tip   Question:  Release to patient  Answer:  Immediate    06/26/24 9056                            Condition: Good    Disposition: PACU - hemodynamically stable.

## 2024-06-26 NOTE — ANESTHESIA PROCEDURE NOTES
Intubation    Date/Time: 6/26/2024 1:07 PM    Performed by: Eduar Delgado CRNA  Authorized by: Aldo Barth MD    Intubation:     Induction:  Intravenous    Mask Ventilation:  Not attempted    Attempts:  1    Attempted By:  CRNA    Method of Intubation:  Other (see comments)    Difficult Airway Encountered?: No      Complications:  None    Airway Device:  Supraglottic airway/LMA    Placement Verified By:  Capnometry    Complicating Factors:  None    Findings Post-Intubation:  BS equal bilateral and atraumatic/condition of teeth unchanged  Notes:      LMA air-q size #3

## 2024-06-27 ENCOUNTER — PATIENT MESSAGE (OUTPATIENT)
Dept: ADMINISTRATIVE | Facility: OTHER | Age: 84
End: 2024-06-27
Payer: OTHER GOVERNMENT

## 2024-06-27 VITALS
DIASTOLIC BLOOD PRESSURE: 74 MMHG | SYSTOLIC BLOOD PRESSURE: 145 MMHG | TEMPERATURE: 98 F | HEART RATE: 84 BPM | BODY MASS INDEX: 26.85 KG/M2 | WEIGHT: 191.81 LBS | HEIGHT: 71 IN | RESPIRATION RATE: 19 BRPM | OXYGEN SATURATION: 96 %

## 2024-06-28 ENCOUNTER — PATIENT MESSAGE (OUTPATIENT)
Dept: ADMINISTRATIVE | Facility: OTHER | Age: 84
End: 2024-06-28
Payer: OTHER GOVERNMENT

## 2024-06-28 LAB
ABO + RH BLD: NORMAL
ABO + RH BLD: NORMAL
BLD PROD TYP BPU: NORMAL
BLD PROD TYP BPU: NORMAL
BLOOD UNIT EXPIRATION DATE: NORMAL
BLOOD UNIT EXPIRATION DATE: NORMAL
BLOOD UNIT TYPE CODE: 5100
BLOOD UNIT TYPE CODE: 5100
CROSSMATCH INTERPRETATION: NORMAL
CROSSMATCH INTERPRETATION: NORMAL
DISPENSE STATUS: NORMAL
DISPENSE STATUS: NORMAL
PSYCHE PATHOLOGY RESULT: NORMAL
UNIT NUMBER: NORMAL
UNIT NUMBER: NORMAL

## 2024-06-29 NOTE — ANESTHESIA POSTPROCEDURE EVALUATION
Anesthesia Post Evaluation    Patient: Mikel Jewell    Procedure(s) Performed: Procedure(s) (LRB):  URETHRECTOMY (N/A)    Final Anesthesia Type: general      Patient location during evaluation: PACU  Patient participation: Yes- Able to Participate  Level of consciousness: awake and alert  Post-procedure vital signs: reviewed and stable  Pain management: adequate  Airway patency: patent    PONV status at discharge: No PONV  Anesthetic complications: no      Respiratory status: unassisted  Hydration status: euvolemic  Follow-up not needed.              Vitals Value Taken Time   /74 06/26/24 1745   Temp 36.4 °C (97.5 °F) 06/26/24 1645   Pulse 84 06/26/24 1745   Resp 19 06/26/24 1745   SpO2 96 % 06/26/24 1745         Event Time   Out of Recovery 16:40:00         Pain/Marie Score: No data recorded

## 2024-08-16 ENCOUNTER — OFFICE VISIT (OUTPATIENT)
Facility: CLINIC | Age: 84
End: 2024-08-16
Payer: OTHER GOVERNMENT

## 2024-08-16 VITALS
HEART RATE: 53 BPM | DIASTOLIC BLOOD PRESSURE: 72 MMHG | HEIGHT: 71 IN | BODY MASS INDEX: 26.74 KG/M2 | SYSTOLIC BLOOD PRESSURE: 137 MMHG | WEIGHT: 191 LBS

## 2024-08-16 DIAGNOSIS — M54.9 CHRONIC BACK PAIN GREATER THAN 3 MONTHS DURATION: Primary | ICD-10-CM

## 2024-08-16 DIAGNOSIS — G89.29 CHRONIC BACK PAIN GREATER THAN 3 MONTHS DURATION: Primary | ICD-10-CM

## 2024-08-16 DIAGNOSIS — M47.816 LUMBAR SPONDYLOSIS: ICD-10-CM

## 2024-08-16 DIAGNOSIS — M48.061 SPINAL STENOSIS OF LUMBAR REGION WITHOUT NEUROGENIC CLAUDICATION: ICD-10-CM

## 2024-08-16 RX ORDER — PREGABALIN 25 MG/1
25 CAPSULE ORAL 2 TIMES DAILY
Qty: 60 CAPSULE | Refills: 3 | Status: SHIPPED | OUTPATIENT
Start: 2024-08-16

## 2024-08-16 NOTE — PROGRESS NOTES
María Monet MD        PATIENT NAME: Mikel Jewell  : 1940  DATE: 24  MRN: [id number]      Billing Provider: María Monet MD  Level of Service:   Patient PCP Information       Provider PCP Type    David Kim Jr, MD General            Reason for Visit / Chief Complaint: Follow-up (3 week f/u after PT  & Dry needling states didn't help C/O pain level 6, Taking tylenol for pain)       Update PCP  Update Chief Complaint         History of Present Illness / Problem Focused Workflow     Mikel Jewell presents to the clinic with Follow-up (3 week f/u after PT  & Dry needling states didn't help C/O pain level 6, Taking tylenol for pain)     This is an 83-year-old male who presents to clinic today for follow up of low back pain that radiates into the hip and buttock that began after he fell last year.  He states that he slipped and fell on the concrete.  He saw Dr. Montalvo and was referred to physical therapy but states it did not help.  He was then referred to Dr. Richard in [place], who sent him to the hospital and he underwent a lumbar injection that did not help.  He does not like to take many medications, but will take half of a tizanidine every now and then when the pain gets too severe.  He also uses topical hempvana which helps a little bit.  He has not undergone any previous lumbar spine surgery.  He did some physical therapy and dry needling after I referred him at his initial appointment here 3 months ago.  He states that it did help with the pain on his right side, but he is still complaining of left-sided low back pain.  He underwent a urethrectomy in  and has been recovering from surgery since then.  He has not really been able to exercise much since the surgery.  His friend recommended a supplement that he would like to try.  He also uses ice packs for relief.      Low-back Pain  Associated symptoms include chest pain (f/u with cardiology).   Follow-up  Associated  symptoms include chest pain (f/u with cardiology) and fatigue.       Review of Systems     Review of Systems   Constitutional:  Positive for fatigue.   Cardiovascular:  Positive for chest pain (f/u with cardiology).   Genitourinary:  Positive for hematuria (h/o cystectomy).   Musculoskeletal:  Positive for back pain.   All other systems reviewed and are negative.       Medical / Social / Family History     Past Medical History:   Diagnosis Date    Bladder cancer     Cancer of trigone of urinary bladder     Chronic back pain     Coronary artery disease     GERD (gastroesophageal reflux disease)     Hypertension     Osteoarthritis     Renal cancer     Testicular pain     Walker as ambulation aid        Past Surgical History:   Procedure Laterality Date    ATHERECTOMY  12/01/2023    Procedure: Atherectomy;  Surgeon: jT Lovell MD;  Location: [facility] CATH LAB;  Service: Cardiology;;    CHOLECYSTECTOMY      COLONOSCOPY      CORONARY ANGIOPLASTY WITH STENT PLACEMENT      ESOPHAGOGASTRODUODENOSCOPY      HERNIA REPAIR Bilateral     IVUS, CORONARY  12/01/2023    Procedure: IVUS, Coronary;  Surgeon: Tj Lovell MD;  Location: [facility] CATH LAB;  Service: Cardiology;;    LEFT HEART CATHETERIZATION N/A 12/01/2023    Procedure: Left heart cath;  Surgeon: Tj Lovell MD;  Location: [facility] CATH LAB;  Service: Cardiology;  Laterality: N/A;    LEFT HEART CATHETERIZATION Left 12/18/2023    Procedure: Left heart cath;  Surgeon: Dalia Li MD;  Location: [facility] CATH LAB;  Service: Cardiology;  Laterality: Left;  [facility]    NEPHRECTOMY Left     PENILE PROSTHESIS IMPLANT      PERCUTANEOUS CORONARY INTERVENTION, ARTERY N/A 12/01/2023    Procedure: Percutaneous coronary intervention;  Surgeon: Tj Lovell MD;  Location: [facility] CATH LAB;  Service: Cardiology;  Laterality: N/A;    PROSTATE SURGERY  10/2022    Removal with bladder removal    Removal of bladder tumors      ROBOT-ASSISTED LAPAROSCOPIC CYSTECTOMY N/A 10/11/2022     Procedure: ROBOTIC CYSTECTOMY;  Surgeon: Flip Barrett MD;  Location: [facility] OR;  Service: Urology;  Laterality: N/A;  ROBOT ASSISTED CYSTECTOMY WITH URINARY DIVERSION //   ANY INDICATED PROCEDURES    ROBOT-ASSISTED LAPAROSCOPIC PROSTATECTOMY USING DA RASHEED XI  10/11/2022    Procedure: XI ROBOTIC PROSTATECTOMY;  Surgeon: Flip Barrett MD;  Location: [facility] OR;  Service: Urology;;    SMALL INTESTINE SURGERY  10/2022    Ileistomy    TURBT, WITH BLUE LIGHT CYSTOSCOPY AND CYSVIEW      URETHRECTOMY N/A 6/26/2024    Procedure: URETHRECTOMY;  Surgeon: Cipriano Callejas MD;  Location: [facility] OR;  Service: Urology;  Laterality: N/A;  WITH DR. CHESTER BARRETT    urostomy         Social History  Mr. Jewell  reports that he has quit smoking. His smoking use included cigarettes. He has a 22.5 pack-year smoking history. He has never used smokeless tobacco. He reports that he does not drink alcohol and does not use drugs.    Family History  Mr.'s Jewell family history includes Cancer in his brother; Depression in his mother; Diabetes in his mother.    Medications and Allergies     Medications  Outpatient Medications Marked as Taking for the 8/16/24 encounter (Office Visit) with María Monet MD   Medication Sig Dispense Refill    acetaminophen (TYLENOL) 325 MG tablet Take prn      amLODIPine (NORVASC) 2.5 MG tablet Take 1 tablet (2.5 mg total) by mouth once daily. 30 tablet 3    losartan (COZAAR) 50 MG tablet Take 50 mg by mouth once daily.         Allergies  Review of patient's allergies indicates:   Allergen Reactions    Atorvastatin      Other Reaction(s): Chest pain  [Snomed:[id number]]       Physical Examination     Vitals:    08/16/24 1040   BP: 137/72   Pulse: (!) 53     Pain Disability Index (PDI): 33       Spine Musculoskeletal Exam    Gait    Assistive device: cane    Inspection    Thoracolumbar    Thoracolumbar inspection is normal.    Palpation    Thoracolumbar    Tenderness: none    Right      Masses: none       Spasms: none      Crepitus: none      Muscle tone: normal    Left      Masses: none      Spasms: none      Crepitus: none      Muscle tone: normal    Range of Motion    Thoracolumbar        Thoracolumbar range of motion additional comments: Limited range of motion in the lumbar spine due to pain.    Strength    Thoracolumbar    Thoracolumbar motor exam is normal.       Sensory    Thoracolumbar    Thoracolumbar sensation is normal.    General      Constitutional: appears stated age, well-developed and well-nourished    Scleral icterus: no    Labored breathing: no    Psychiatric: normal mood and affect and no acute distress    Neurological: alert and oriented x3    Skin: intact    Lymphadenopathy: none       Assessment and Plan (including Health Maintenance)      Problem List  Smart Sets  Document Outside HM   :    Plan:   Chronic back pain greater than 3 months duration  -     pregabalin (LYRICA) 25 MG capsule; Take 1 capsule (25 mg total) by mouth 2 (two) times daily.  Dispense: 60 capsule; Refill: 3    Lumbar spondylosis  -     pregabalin (LYRICA) 25 MG capsule; Take 1 capsule (25 mg total) by mouth 2 (two) times daily.  Dispense: 60 capsule; Refill: 3    Spinal stenosis of lumbar region without neurogenic claudication  -     pregabalin (LYRICA) 25 MG capsule; Take 1 capsule (25 mg total) by mouth 2 (two) times daily.  Dispense: 60 capsule; Refill: 3       I am starting him on Lyrica for the neuropathic component of his pain and will titrate up the dose as tolerated.  I will follow up with him in 3 months.    Problem List Items Addressed This Visit          Neuro    Spinal stenosis of lumbar region without neurogenic claudication    Relevant Medications    pregabalin (LYRICA) 25 MG capsule    Lumbar spondylosis    Relevant Medications    pregabalin (LYRICA) 25 MG capsule       Orthopedic    Chronic back pain greater than 3 months duration - Primary    Relevant Medications    pregabalin (LYRICA) 25 MG capsule          Future Appointments   Date Time Provider Department Center   12/17/2024  9:30 AM María Monet MD [facility]re[facility]          There are no Patient Instructions on file for this visit.  Follow up in about 4 months (around 12/16/2024).     Signature:  María Monet MD      Date of encounter: 8/16/24

## 2024-09-17 ENCOUNTER — TELEPHONE (OUTPATIENT)
Facility: CLINIC | Age: 84
End: 2024-09-17
Payer: OTHER GOVERNMENT

## 2024-09-17 DIAGNOSIS — M47.816 LUMBAR SPONDYLOSIS: ICD-10-CM

## 2024-09-17 DIAGNOSIS — M48.061 SPINAL STENOSIS OF LUMBAR REGION WITHOUT NEUROGENIC CLAUDICATION: ICD-10-CM

## 2024-09-17 DIAGNOSIS — G89.29 CHRONIC BACK PAIN GREATER THAN 3 MONTHS DURATION: ICD-10-CM

## 2024-09-17 DIAGNOSIS — M54.9 CHRONIC BACK PAIN GREATER THAN 3 MONTHS DURATION: ICD-10-CM

## 2024-09-17 RX ORDER — PREGABALIN 50 MG/1
50 CAPSULE ORAL 2 TIMES DAILY
Qty: 60 CAPSULE | Refills: 2 | Status: SHIPPED | OUTPATIENT
Start: 2024-09-17 | End: 2024-09-20

## 2024-09-17 NOTE — TELEPHONE ENCOUNTER
Informed pt about prescription he will hold off on nurse visit and call back on Monday if he will need nurse visit.

## 2024-09-17 NOTE — TELEPHONE ENCOUNTER
----- Message from Isabell Zaragoza MA sent at 9/16/2024  2:32 PM CDT -----  Regarding: FW: Ken Monet please see note from Ms Bai  ----- Message -----  From: Bhumika Holcomb  Sent: 9/16/2024   2:24 PM CDT  To: [facility] Staff  Subject: Lyrica                                           Mr. Morin phoned the office today to let you know that the Lyrica is not helping his pain.  Maybe you can increase the dose like you discussed with him on his last appointment.  In the meantime do you think he can get a shot to help with the pain, torodal or something.

## 2024-09-17 NOTE — TELEPHONE ENCOUNTER
----- Message from Bhumika Holcomb sent at 9/16/2024  2:20 PM CDT -----  Regarding: Lyrica  Mr. Morin phoned the office today to let you know that the Lyrica is not helping his pain.  Maybe you can increase the dose like you discussed with him on his last appointment.  In the meantime do you think he can get a shot to help with the pain, torodal or something.

## 2024-09-20 ENCOUNTER — CLINICAL SUPPORT (OUTPATIENT)
Facility: CLINIC | Age: 84
End: 2024-09-20
Payer: OTHER GOVERNMENT

## 2024-09-20 VITALS
DIASTOLIC BLOOD PRESSURE: 77 MMHG | HEART RATE: 60 BPM | HEIGHT: 71 IN | WEIGHT: 191 LBS | BODY MASS INDEX: 26.74 KG/M2 | SYSTOLIC BLOOD PRESSURE: 130 MMHG

## 2024-09-20 DIAGNOSIS — M51.36 DDD (DEGENERATIVE DISC DISEASE), LUMBAR: Primary | ICD-10-CM

## 2024-09-20 RX ORDER — KETOROLAC TROMETHAMINE 30 MG/ML
30 INJECTION, SOLUTION INTRAMUSCULAR; INTRAVENOUS ONCE
Status: COMPLETED | OUTPATIENT
Start: 2024-09-20 | End: 2024-09-20

## 2024-09-20 RX ADMIN — KETOROLAC TROMETHAMINE 30 MG: 30 INJECTION, SOLUTION INTRAMUSCULAR; INTRAVENOUS at 10:09

## 2024-09-24 ENCOUNTER — OFFICE VISIT (OUTPATIENT)
Facility: CLINIC | Age: 84
End: 2024-09-24
Payer: OTHER GOVERNMENT

## 2024-09-24 ENCOUNTER — TELEPHONE (OUTPATIENT)
Facility: CLINIC | Age: 84
End: 2024-09-24

## 2024-09-24 VITALS
HEART RATE: 52 BPM | DIASTOLIC BLOOD PRESSURE: 79 MMHG | WEIGHT: 191 LBS | BODY MASS INDEX: 26.74 KG/M2 | SYSTOLIC BLOOD PRESSURE: 153 MMHG | HEIGHT: 71 IN

## 2024-09-24 DIAGNOSIS — M54.9 CHRONIC BACK PAIN GREATER THAN 3 MONTHS DURATION: ICD-10-CM

## 2024-09-24 DIAGNOSIS — M54.9 DORSALGIA, UNSPECIFIED: ICD-10-CM

## 2024-09-24 DIAGNOSIS — M48.061 SPINAL STENOSIS OF LUMBAR REGION WITHOUT NEUROGENIC CLAUDICATION: Primary | ICD-10-CM

## 2024-09-24 DIAGNOSIS — M47.816 LUMBAR SPONDYLOSIS: ICD-10-CM

## 2024-09-24 DIAGNOSIS — G89.29 CHRONIC BACK PAIN GREATER THAN 3 MONTHS DURATION: ICD-10-CM

## 2024-09-24 PROCEDURE — 99213 OFFICE O/P EST LOW 20 MIN: CPT | Mod: ,,, | Performed by: ANESTHESIOLOGY

## 2024-10-02 ENCOUNTER — HOSPITAL ENCOUNTER (OUTPATIENT)
Dept: RADIOLOGY | Facility: HOSPITAL | Age: 84
Discharge: HOME | End: 2024-10-02
Attending: ANESTHESIOLOGY
Payer: OTHER GOVERNMENT

## 2024-10-02 DIAGNOSIS — M54.9 DORSALGIA, UNSPECIFIED: ICD-10-CM

## 2024-10-02 PROCEDURE — 72148 MRI LUMBAR SPINE W/O DYE: CPT | Mod: TC

## 2024-10-04 ENCOUNTER — TELEPHONE (OUTPATIENT)
Facility: CLINIC | Age: 84
End: 2024-10-04
Payer: OTHER GOVERNMENT

## 2024-10-04 DIAGNOSIS — M54.9 CHRONIC BACK PAIN GREATER THAN 3 MONTHS DURATION: Primary | ICD-10-CM

## 2024-10-04 DIAGNOSIS — G89.29 CHRONIC BACK PAIN GREATER THAN 3 MONTHS DURATION: Primary | ICD-10-CM

## 2024-10-04 RX ORDER — TIZANIDINE 4 MG/1
4 TABLET ORAL 3 TIMES DAILY PRN
Qty: 50 TABLET | Refills: 2 | Status: SHIPPED | OUTPATIENT
Start: 2024-10-04

## 2024-10-04 NOTE — TELEPHONE ENCOUNTER
----- Message from Bhumika sent at 10/3/2024  4:18 PM CDT -----  Regarding: Pain  Mr. Morin phoned again today for something to help with the pain.  The pain is really bad and he needs something to help with the pain.  Please send something to his pharmacy.

## 2024-10-07 DIAGNOSIS — G89.29 CHRONIC BACK PAIN GREATER THAN 3 MONTHS DURATION: Primary | ICD-10-CM

## 2024-10-07 DIAGNOSIS — M54.9 CHRONIC BACK PAIN GREATER THAN 3 MONTHS DURATION: Primary | ICD-10-CM

## 2024-10-07 DIAGNOSIS — M54.9 DORSALGIA, UNSPECIFIED: ICD-10-CM

## 2024-10-07 DIAGNOSIS — M47.816 LUMBAR SPONDYLOSIS: ICD-10-CM

## 2024-10-14 ENCOUNTER — TELEPHONE (OUTPATIENT)
Facility: CLINIC | Age: 84
End: 2024-10-14
Payer: OTHER GOVERNMENT

## 2024-10-14 DIAGNOSIS — M54.9 CHRONIC BACK PAIN GREATER THAN 3 MONTHS DURATION: Primary | ICD-10-CM

## 2024-10-14 DIAGNOSIS — G89.29 CHRONIC BACK PAIN GREATER THAN 3 MONTHS DURATION: Primary | ICD-10-CM

## 2024-10-14 NOTE — TELEPHONE ENCOUNTER
Pt contacted office stating he tested + for covid on Sunday and is taking paxlovid, injection is scheduled for 11/17/24 Thursday he wants to know if he can still have his injection if not can he get something to have relief until he can have injection, correct pharmacy is   [id number] - [place], [place] - [address] AT [facility] [address]   Please advise thanks

## 2024-10-15 NOTE — TELEPHONE ENCOUNTER
Pt is now scheduled  for 11/07/24, pt has requested something for relief he states he can not cope with the amount of pain he has been in lately correct pharmacy is [facility] in [place].please advise thanks

## 2024-10-16 RX ORDER — AMITRIPTYLINE HYDROCHLORIDE 10 MG/1
10 TABLET, FILM COATED ORAL NIGHTLY PRN
Qty: 30 TABLET | Refills: 0 | Status: SHIPPED | OUTPATIENT
Start: 2024-10-16

## 2024-11-07 ENCOUNTER — ANESTHESIA EVENT (OUTPATIENT)
Facility: HOSPITAL | Age: 84
End: 2024-11-07
Payer: OTHER GOVERNMENT

## 2024-11-07 ENCOUNTER — HOSPITAL ENCOUNTER (OUTPATIENT)
Facility: HOSPITAL | Age: 84
Discharge: HOME | End: 2024-11-07
Attending: ANESTHESIOLOGY | Admitting: ANESTHESIOLOGY
Payer: OTHER GOVERNMENT

## 2024-11-07 ENCOUNTER — ANESTHESIA (OUTPATIENT)
Facility: HOSPITAL | Age: 84
End: 2024-11-07
Payer: OTHER GOVERNMENT

## 2024-11-07 DIAGNOSIS — G89.29 CHRONIC BACK PAIN GREATER THAN 3 MONTHS DURATION: Primary | ICD-10-CM

## 2024-11-07 DIAGNOSIS — M54.9 CHRONIC BACK PAIN GREATER THAN 3 MONTHS DURATION: Primary | ICD-10-CM

## 2024-11-07 PROCEDURE — 37000008 HC ANESTHESIA 1ST 15 MINUTES: Performed by: ANESTHESIOLOGY

## 2024-11-07 PROCEDURE — 63600175 PHARM REV CODE 636 W HCPCS: Mod: JZ | Performed by: ANESTHESIOLOGY

## 2024-11-07 PROCEDURE — 63600175 PHARM REV CODE 636 W HCPCS: Performed by: NURSE ANESTHETIST, CERTIFIED REGISTERED

## 2024-11-07 PROCEDURE — 64483 NJX AA&/STRD TFRM EPI L/S 1: CPT | Mod: 50 | Performed by: ANESTHESIOLOGY

## 2024-11-07 PROCEDURE — 64483 NJX AA&/STRD TFRM EPI L/S 1: CPT | Mod: 50,,, | Performed by: ANESTHESIOLOGY

## 2024-11-07 RX ORDER — LIDOCAINE HYDROCHLORIDE 10 MG/ML
1 INJECTION, SOLUTION EPIDURAL; INFILTRATION; INTRACAUDAL; PERINEURAL ONCE
OUTPATIENT
Start: 2024-11-07 | End: 2024-11-07

## 2024-11-07 RX ORDER — SODIUM CHLORIDE, SODIUM GLUCONATE, SODIUM ACETATE, POTASSIUM CHLORIDE AND MAGNESIUM CHLORIDE 30; 37; 368; 526; 502 MG/100ML; MG/100ML; MG/100ML; MG/100ML; MG/100ML
INJECTION, SOLUTION INTRAVENOUS CONTINUOUS
OUTPATIENT
Start: 2024-11-07 | End: 2024-12-07

## 2024-11-07 RX ORDER — LIDOCAINE HYDROCHLORIDE 10 MG/ML
INJECTION, SOLUTION EPIDURAL; INFILTRATION; INTRACAUDAL; PERINEURAL
Status: DISCONTINUED | OUTPATIENT
Start: 2024-11-07 | End: 2024-11-07 | Stop reason: HOSPADM

## 2024-11-07 RX ORDER — LIDOCAINE HYDROCHLORIDE 10 MG/ML
INJECTION, SOLUTION EPIDURAL; INFILTRATION; INTRACAUDAL; PERINEURAL
Status: DISCONTINUED | OUTPATIENT
Start: 2024-11-07 | End: 2024-11-07

## 2024-11-07 RX ORDER — HYDROMORPHONE HYDROCHLORIDE 2 MG/ML
0.2 INJECTION, SOLUTION INTRAMUSCULAR; INTRAVENOUS; SUBCUTANEOUS EVERY 5 MIN PRN
OUTPATIENT
Start: 2024-11-07

## 2024-11-07 RX ORDER — PROPOFOL 10 MG/ML
VIAL (ML) INTRAVENOUS
Status: DISCONTINUED | OUTPATIENT
Start: 2024-11-07 | End: 2024-11-07

## 2024-11-07 RX ORDER — BUPIVACAINE HYDROCHLORIDE 2.5 MG/ML
INJECTION, SOLUTION EPIDURAL; INFILTRATION; INTRACAUDAL
Status: DISCONTINUED | OUTPATIENT
Start: 2024-11-07 | End: 2024-11-07 | Stop reason: HOSPADM

## 2024-11-07 RX ORDER — DEXAMETHASONE SODIUM PHOSPHATE 10 MG/ML
INJECTION INTRAMUSCULAR; INTRAVENOUS
Status: DISCONTINUED | OUTPATIENT
Start: 2024-11-07 | End: 2024-11-07 | Stop reason: HOSPADM

## 2024-11-07 RX ORDER — GLUCAGON 1 MG
1 KIT INJECTION
OUTPATIENT
Start: 2024-11-07

## 2024-11-07 RX ORDER — DIPHENHYDRAMINE HYDROCHLORIDE 50 MG/ML
25 INJECTION INTRAMUSCULAR; INTRAVENOUS EVERY 6 HOURS PRN
OUTPATIENT
Start: 2024-11-07

## 2024-11-07 RX ORDER — MIDAZOLAM HYDROCHLORIDE 2 MG/2ML
2 INJECTION, SOLUTION INTRAMUSCULAR; INTRAVENOUS ONCE AS NEEDED
OUTPATIENT
Start: 2024-11-07 | End: 2036-04-05

## 2024-11-07 RX ORDER — ACETAMINOPHEN 10 MG/ML
1000 INJECTION, SOLUTION INTRAVENOUS ONCE
OUTPATIENT
Start: 2024-11-07 | End: 2024-11-07

## 2024-11-07 RX ORDER — ONDANSETRON HYDROCHLORIDE 2 MG/ML
4 INJECTION, SOLUTION INTRAVENOUS DAILY PRN
OUTPATIENT
Start: 2024-11-07

## 2024-11-07 RX ADMIN — LIDOCAINE HYDROCHLORIDE 20 MG: 10 INJECTION, SOLUTION EPIDURAL; INFILTRATION; INTRACAUDAL; PERINEURAL at 10:11

## 2024-11-07 RX ADMIN — PROPOFOL 50 MG: 10 INJECTION, EMULSION INTRAVENOUS at 10:11

## 2024-11-07 NOTE — H&P
María Monet MD          PATIENT NAME: Mikel Jewell  : 1940    MRN: [id number]       Billing Provider: María Monet MD  Level of Service:   Patient PCP Information         Provider PCP Type     David Kim Jr, MD General                Reason for Visit / Chief Complaint: Back Pain (Pt having increased lower back pain was given a toradol injection 24 states injection didn't help C/O pain level 10 having more Rt hip pain.pt would like to have new xrays and imaging done if you are in agreement. )         Update PCP   Update Chief Complaint             History of Present Illness / Problem Focused Workflow      Mikel Jewell presents to the clinic with Back Pain (Pt having increased lower back pain was given a toradol injection 24 states injection didn't help C/O pain level 10 having more Rt hip pain.pt would like to have new xrays and imaging done if you are in agreement. )     This is an 83-year-old male who presents to clinic today for follow up of low back pain that radiates into the hip and buttock that began after he fell last year.  He states that he slipped and fell on the concrete.  He saw Dr. Montalvo and was referred to physical therapy but states it did not help.  He was then referred to Dr. Richard in [place], who sent him to the hospital and he underwent a lumbar injection that did not help.  He does not like to take many medications, but will take half of a tizanidine every now and then when the pain gets too severe.  He also uses topical hempvana which helps a little bit.  He has not undergone any previous lumbar spine surgery.  He did some physical therapy and dry needling after I referred him at his initial appointment here 3 months ago.  He states that it did help with the pain on his right side, but he is still complaining of left-sided low back pain.  He underwent a urethrectomy in  and has been recovering from surgery since then.  He has not really been  able to exercise much since the surgery.  His friend recommended a supplement that he would like to try.  He also uses ice packs for relief.        Low-back Pain  Associated symptoms include chest pain (f/u with cardiology).   Follow-up  Associated symptoms include chest pain (f/u with cardiology) and fatigue.   Back Pain  Associated symptoms include chest pain (f/u with cardiology).         Review of Systems      Review of Systems   Constitutional:  Positive for fatigue.   Cardiovascular:  Positive for chest pain (f/u with cardiology).   Genitourinary:  Positive for hematuria (h/o cystectomy).   Musculoskeletal:  Positive for back pain.   All other systems reviewed and are negative.        Medical / Social / Family History           Past Medical History:   Diagnosis Date    Bladder cancer      Cancer of trigone of urinary bladder      Chronic back pain      Coronary artery disease      GERD (gastroesophageal reflux disease)      Hypertension      Osteoarthritis      Renal cancer      Testicular pain      Walker as ambulation aid                 Past Surgical History:   Procedure Laterality Date    ATHERECTOMY   12/01/2023     Procedure: Atherectomy;  Surgeon: Tj Lovell MD;  Location: [facility] CATH LAB;  Service: Cardiology;;    CHOLECYSTECTOMY        COLONOSCOPY        CORONARY ANGIOPLASTY WITH STENT PLACEMENT        ESOPHAGOGASTRODUODENOSCOPY        HERNIA REPAIR Bilateral      IVUS, CORONARY   12/01/2023     Procedure: IVUS, Coronary;  Surgeon: Tj Lovell MD;  Location: [facility] CATH LAB;  Service: Cardiology;;    LEFT HEART CATHETERIZATION N/A 12/01/2023     Procedure: Left heart cath;  Surgeon: Tj Lovell MD;  Location: [facility] CATH LAB;  Service: Cardiology;  Laterality: N/A;    LEFT HEART CATHETERIZATION Left 12/18/2023     Procedure: Left heart cath;  Surgeon: Dalia Li MD;  Location: [facility] CATH LAB;  Service: Cardiology;  Laterality: Left;  LHC    NEPHRECTOMY Left      PENILE PROSTHESIS  IMPLANT        PERCUTANEOUS CORONARY INTERVENTION, ARTERY N/A 12/01/2023     Procedure: Percutaneous coronary intervention;  Surgeon: Tj Lovell MD;  Location: [facility] CATH LAB;  Service: Cardiology;  Laterality: N/A;    PROSTATE SURGERY   10/2022     Removal with bladder removal    Removal of bladder tumors        ROBOT-ASSISTED LAPAROSCOPIC CYSTECTOMY N/A 10/11/2022     Procedure: ROBOTIC CYSTECTOMY;  Surgeon: Flip Barrett MD;  Location: [facility] OR;  Service: Urology;  Laterality: N/A;  ROBOT ASSISTED CYSTECTOMY WITH URINARY DIVERSION //   ANY INDICATED PROCEDURES    ROBOT-ASSISTED LAPAROSCOPIC PROSTATECTOMY USING DA RASHEED XI   10/11/2022     Procedure: XI ROBOTIC PROSTATECTOMY;  Surgeon: Flip Barrett MD;  Location: [facility] OR;  Service: Urology;;    SMALL INTESTINE SURGERY   10/2022     Ileistomy    TURBT, WITH BLUE LIGHT CYSTOSCOPY AND CYSVIEW        URETHRECTOMY N/A 6/26/2024     Procedure: URETHRECTOMY;  Surgeon: Cipriano Callejas MD;  Location: [facility] OR;  Service: Urology;  Laterality: N/A;  WITH DR. CHESTER BARRETT    urostomy             Social History  Mr. Jewell  reports that he has quit smoking. His smoking use included cigarettes. He has a 22.5 pack-year smoking history. He has never used smokeless tobacco. He reports that he does not drink alcohol and does not use drugs.     Family History  Mr.'s Jewell family history includes Cancer in his brother; Depression in his mother; Diabetes in his mother.     Medications and Allergies      Medications         Outpatient Medications Marked as Taking for the 9/24/24 encounter (Office Visit) with María Monet MD   Medication Sig Dispense Refill    acetaminophen (TYLENOL) 325 MG tablet Take prn        amLODIPine (NORVASC) 2.5 MG tablet Take 1 tablet (2.5 mg total) by mouth once daily. 30 tablet 3    losartan (COZAAR) 50 MG tablet Take 50 mg by mouth once daily.             Allergies        Review of patient's allergies indicates:   Allergen Reactions     Atorvastatin         Other Reaction(s): Chest pain  [Snomed:[id number]]         Physical Examination          Vitals:     09/24/24 1442   BP: (!) 153/79   Pulse: (!) 52         Spine Musculoskeletal Exam     Gait    Assistive device: cane     Inspection    Thoracolumbar    Thoracolumbar inspection is normal.     Palpation    Thoracolumbar    Tenderness: none    Right      Masses: none      Spasms: none      Crepitus: none      Muscle tone: normal    Left      Masses: none      Spasms: none      Crepitus: none      Muscle tone: normal     Range of Motion    Thoracolumbar        Thoracolumbar range of motion additional comments: Limited range of motion in the lumbar spine due to pain.     Strength    Thoracolumbar    Thoracolumbar motor exam is normal.        Sensory    Thoracolumbar    Thoracolumbar sensation is normal.     General      Constitutional: appears stated age, well-developed and well-nourished    Scleral icterus: no    Labored breathing: no    Psychiatric: normal mood and affect and no acute distress    Neurological: alert and oriented x3    Skin: intact    Lymphadenopathy: none        Assessment and Plan (including Health Maintenance)       Problem List   Smart Sets   Document Outside HM   :     Plan:   Spinal stenosis of lumbar region without neurogenic claudication     Lumbar spondylosis     Chronic back pain greater than 3 months duration     Dorsalgia, unspecified  -     MRI Lumbar Spine Without Contrast; Future; Expected date: 09/24/2024        We are discontinuing Lyrica due to side effects.  He continues to complain of worsening pain, particularly on the right side of his lower back.  His MRI was done over a year ago and his symptoms have been changing since then.  I am ordering an updated MRI of the lumbar spine without contrast for further evaluation of his pain.  We will let him know once the results have been reviewed.     Problem List Items Addressed This Visit                  Neuro      Spinal stenosis of lumbar region without neurogenic claudication - Primary     Lumbar spondylosis          Orthopedic     Chronic back pain greater than 3 months duration      Other Visit Diagnoses         Dorsalgia, unspecified         Relevant Orders     MRI Lumbar Spine Without Contrast

## 2024-11-07 NOTE — ANESTHESIA PREPROCEDURE EVALUATION
"                                                                                                             11/07/2024  Mikel Jewell is a 84 y.o., male with chronic pain for epidural steroid injection.    "Mikel Jewell presents to the clinic with Back Pain (Pt having increased lower back pain was given a toradol injection 9/20/24 states injection didn't help C/O pain level 10 having more Rt hip pain.pt would like to have new xrays and imaging done if you are in agreement. )     This is an 83-year-old male who presents to clinic today for follow up of low back pain that radiates into the hip and buttock that began after he fell last year.  He states that he slipped and fell on the concrete.  He saw Dr. Montalvo and was referred to physical therapy but states it did not help.  He was then referred to Dr. Richard in [place], who sent him to the hospital and he underwent a lumbar injection that did not help.  He does not like to take many medications, but will take half of a tizanidine every now and then when the pain gets too severe.  He also uses topical hempvana which helps a little bit.  He has not undergone any previous lumbar spine surgery.  He did some physical therapy and dry needling after I referred him at his initial appointment here 3 months ago.  He states that it did help with the pain on his right side, but he is still complaining of left-sided low back pain.  He underwent a urethrectomy in June and has been recovering from surgery since then.  He has not really been able to exercise much since the surgery.  His friend recommended a supplement that he would like to try.  He also uses ice packs for relief.        Low-back Pain  Associated symptoms include chest pain (f/u with cardiology).   Follow-up  Associated symptoms include chest pain (f/u with cardiology) and fatigue.   Back Pain  Associated symptoms include chest pain (f/u with cardiology).         Review of Systems      Review of " Systems   Constitutional:  Positive for fatigue.   Cardiovascular:  Positive for chest pain (f/u with cardiology).   Genitourinary:  Positive for hematuria (h/o cystectomy).   Musculoskeletal:  Positive for back pain.   All other systems reviewed and are negative.        Medical / Social / Family History              Past Medical History:   Diagnosis Date    Bladder cancer      Cancer of trigone of urinary bladder      Chronic back pain      Coronary artery disease      GERD (gastroesophageal reflux disease)      Hypertension      Osteoarthritis      Renal cancer      Testicular pain      Walker as ambulation aid                     Past Surgical History:   Procedure Laterality Date    ATHERECTOMY   12/01/2023     Procedure: Atherectomy;  Surgeon: Tj Lovell MD;  Location: [facility] CATH LAB;  Service: Cardiology;;    CHOLECYSTECTOMY        COLONOSCOPY        CORONARY ANGIOPLASTY WITH STENT PLACEMENT        ESOPHAGOGASTRODUODENOSCOPY        HERNIA REPAIR Bilateral      IVUS, CORONARY   12/01/2023     Procedure: IVUS, Coronary;  Surgeon: Tj Lovell MD;  Location: [facility] CATH LAB;  Service: Cardiology;;    LEFT HEART CATHETERIZATION N/A 12/01/2023     Procedure: Left heart cath;  Surgeon: Tj Lovell MD;  Location: [facility] CATH LAB;  Service: Cardiology;  Laterality: N/A;    LEFT HEART CATHETERIZATION Left 12/18/2023     Procedure: Left heart cath;  Surgeon: Dalia Li MD;  Location: [facility] CATH LAB;  Service: Cardiology;  Laterality: Left;  LHC    NEPHRECTOMY Left      PENILE PROSTHESIS IMPLANT        PERCUTANEOUS CORONARY INTERVENTION, ARTERY N/A 12/01/2023     Procedure: Percutaneous coronary intervention;  Surgeon: Tj Lovell MD;  Location: [facility] CATH LAB;  Service: Cardiology;  Laterality: N/A;    PROSTATE SURGERY   10/2022     Removal with bladder removal    Removal of bladder tumors        ROBOT-ASSISTED LAPAROSCOPIC CYSTECTOMY N/A 10/11/2022     Procedure: ROBOTIC CYSTECTOMY;  Surgeon: Flip URENA  "MD Yessenia;  Location: [facility];  Service: Urology;  Laterality: N/A;  ROBOT ASSISTED CYSTECTOMY WITH URINARY DIVERSION //   ANY INDICATED PROCEDURES    ROBOT-ASSISTED LAPAROSCOPIC PROSTATECTOMY USING DA RASHEED XI   10/11/2022     Procedure: XI ROBOTIC PROSTATECTOMY;  Surgeon: Flip Barrett MD;  Location: [facility] OR;  Service: Urology;;    SMALL INTESTINE SURGERY   10/2022     Ileistomy    TURBT, WITH BLUE LIGHT CYSTOSCOPY AND CYSVIEW        URETHRECTOMY N/A 6/26/2024     Procedure: URETHRECTOMY;  Surgeon: Cipriano Callejas MD;  Location: [facility] OR;  Service: Urology;  Laterality: N/A;  WITH DR. CHESTER BARRETT    urostomy          ...    Plan:   Spinal stenosis of lumbar region without neurogenic claudication     Lumbar spondylosis     Chronic back pain greater than 3 months duration     Dorsalgia, unspecified  -     MRI Lumbar Spine Without Contrast; Future; Expected date: 09/24/2024        We are discontinuing Lyrica due to side effects.  He continues to complain of worsening pain, particularly on the right side of his lower back.  His MRI was done over a year ago and his symptoms have been changing since then.  I am ordering an updated MRI of the lumbar spine without contrast for further evaluation of his pain.  We will let him know once the results have been reviewed."       2023 echo:  "Summary         Left Ventricle: The left ventricle is normal in size. Normal wall thickness. Normal wall motion. There is normal systolic function with a visually estimated ejection fraction of 55 - 60%. Grade I diastolic dysfunction.    Right Ventricle: Normal right ventricular cavity size. Systolic function is normal.    Aortic Valve: There is mild aortic valve sclerosis.    Mitral Valve: Mildly thickened leaflets. There is no stenosis.    IVC/SVC: Normal venous pressure at 3 mmHg."       Pre-op Assessment    I have reviewed the Patient Summary Reports.     I have reviewed the Nursing Notes. I have reviewed the NPO Status.   I have " reviewed the Medications.     Review of Systems  Anesthesia Hx:             Denies Family Hx of Anesthesia complications.    Denies Personal Hx of Anesthesia complications.                    Cardiovascular:     Hypertension   CAD       Denies Angina.                                    Pulmonary:  Pulmonary Normal                       Renal/:  Chronic Renal Disease                Hepatic/GI:     GERD                Musculoskeletal:  Arthritis                   Physical Exam  General: Well nourished, Cooperative, Alert and Oriented    Airway:  Mallampati: II   Mouth Opening: Normal  TM Distance: Normal  Tongue: Normal  Neck ROM: Normal ROM    Dental:  Dentures    Chest/Lungs:  Normal Respiratory Rate    Heart:  Rate: Normal  Rhythm: Regular Rhythm        Anesthesia Plan  Type of Anesthesia, risks & benefits discussed:    Anesthesia Type: Gen Natural Airway  Intra-op Monitoring Plan: Standard ASA Monitors  Post Op Pain Control Plan: multimodal analgesia  Induction:  IV  Informed Consent: Informed consent signed with the Patient and all parties understand the risks and agree with anesthesia plan.  All questions answered.   ASA Score: 3  Day of Surgery Review of History & Physical: H&P Update referred to the surgeon/provider.    Ready For Surgery From Anesthesia Perspective.     .

## 2024-11-07 NOTE — ANESTHESIA POSTPROCEDURE EVALUATION
Anesthesia Post Evaluation    Patient: Mikel Jewell    Procedure(s) Performed: Procedure(s) (LRB):  INJECTION, STEROID, EPIDURAL, TRANSFORAMINAL APPROACH   ////Bilateral TFESI L4 (VA) (Bilateral)    Final Anesthesia Type: general      Patient location during evaluation: OPS  Patient participation: Yes- Able to Participate  Level of consciousness: awake and alert  Post-procedure vital signs: reviewed and stable  Pain management: adequate  Airway patency: patent  SHAQUILLE mitigation strategies: Preoperative initiation of continuous positive airway pressure (CPAP) or non-invasive positive pressure ventilation (NIPPV)  PONV status at discharge: No PONV  Anesthetic complications: no      Cardiovascular status: blood pressure returned to baseline  Respiratory status: unassisted and spontaneous ventilation  Hydration status: euvolemic  Follow-up not needed.              Vitals Value Taken Time   /85 11/07/24 1037   Temp 36.7 °C (98 °F) 11/07/24 1037   Pulse 60 11/07/24 1037   Resp 16 11/07/24 1037   SpO2 99 % 11/07/24 1037         No case tracking events are documented in the log.      Pain/Marie Score: No data recorded

## 2024-11-07 NOTE — ANESTHESIA POSTPROCEDURE EVALUATION
Anesthesia Post Evaluation    Patient: Mikel Jewell    Procedure(s) Performed: Procedure(s) (LRB):  INJECTION, STEROID, EPIDURAL, TRANSFORAMINAL APPROACH   ////Bilateral TFESI L4 (VA) (Bilateral)    [facility] Anesthesia Post Op Evaluation      Vitals Value Taken Time   /78 11/07/24 1037   Temp 36.7 °C (98 °F) 11/07/24 1037   Pulse 560 11/07/24 1037   Resp 16 11/07/24 1037   SpO2 99 % 11/07/24 1037         No case tracking events are documented in the log.      Pain/Marie Score: No data recorded

## 2024-11-07 NOTE — TRANSFER OF CARE
"Anesthesia Transfer of Care Note    Patient: Mikel Jewell    Procedure(s) Performed: Procedure(s) (LRB):  INJECTION, STEROID, EPIDURAL, TRANSFORAMINAL APPROACH   ////Bilateral TFESI L4 (VA) (Bilateral)    Patient location: OPS    Anesthesia Type: general    Transport from OR: Transported from OR on room air with adequate spontaneous ventilation    Post pain: adequate analgesia    Post assessment: no apparent anesthetic complications    Post vital signs: stable    Level of consciousness: awake and alert    Nausea/Vomiting: no nausea/vomiting    Complications: none    Transfer of care protocol was followed      Last vitals: Visit Vitals  BP (!) 165/80   Pulse (!) 58   Temp 36.7 °C (98 °F) (Oral)   Ht 5' 11" (1.803 m)   Wt 88.5 kg (195 lb)   SpO2 99%   BMI 27.20 kg/m²     "

## 2024-11-07 NOTE — DISCHARGE SUMMARY
[facility] Surgical - Periop Services 2nd Floor  Discharge Note  Short Stay    Procedure(s) (LRB):  INJECTION, STEROID, EPIDURAL, TRANSFORAMINAL APPROACH   ////Bilateral TFESI L4 (VA) (Bilateral)      OUTCOME: Patient tolerated treatment/procedure well without complication and is now ready for discharge.    DISPOSITION: Home or Self Care    FINAL DIAGNOSIS:  <principal problem not specified>    FOLLOWUP: In clinic    DISCHARGE INSTRUCTIONS:  No discharge procedures on file.     TIME SPENT ON DISCHARGE: 5 minutes

## 2024-11-07 NOTE — DISCHARGE INSTRUCTIONS
EPIDURAL STEROID INJECTION, CARE AFTER      NO HEAVY LIFTING FOR ONE WEEK  NO HEAT FOR 24 HOURS  APPLY ICE PACK FOR COMFORT TO SITES  REMOVE BAND-AIDS TOMORROW AND THEN YOU MAY SHOWER  NO TUB SOAKING FOR 3-5 DAYS  No aspirin, blood thinners, or NSAIDs for 24 hours.    These instructions give you information on caring for yourself after your procedure. Your doctor may also give you specific instructions. Call your doctor if you have any problems or questions after your procedure.     HOME CARE  Do not drive or use any machinery for the next 24 hours.  Do not do any hard physical activity for the next 24 hours  Do not use a heating pad in area of injection  You may go back to eating as usual    SIDE EFFECTS THAT COULD HAPPEN UP TO 4 HOURS AFTER THE INJECTION  If you have leg weakness or numbness, have someone help you walk. If the weakness or numbness does not go away, or if it gets worse go to the emergency department.  If you have dizziness, lie down right away. This usually helps. Sit up slowly and then when you have been sitting for a few minutes then stand up.  If you have a mild headache, drink fluids (especially drinks with caffeine) Call your doctor if the headache gets worse or persists.  When the numbing medicine wears off you may feel some discomfort where you had the shot. It usually only lasts for a few days. You may put ice over the injection site. Leave ice on for 20 minutes at a time and protect your skin during use.   You may feel minor back pain and stiffness at the site of the shot. Call your doctor if this pain gets worse or does not improve. You may feel nauseous or vomit several hours after your procedure. If this happens, try drinking small amounts of clear liquids until you feel better. If you continue to feel nauseated or continue vomiting, get help right away.    Steroids may take several days to start working. The shot usually helps leg pain more than back pain. The shot will not fix what is  causing the pain but may take away some of the pain. This pain relief may last from 2 weeks to 6 months.     GET HELP RIGHT AWAY IF:  You have very bad pain, headache or neck pain or stiffness  There is a change in your vision (double or blurry vision)  You have a fever over 101 or chills  You have swelling or redness at the injection site  You get weaker  You are not able to control your bladder or bowels  You are not able to urinate

## 2024-11-08 VITALS
HEART RATE: 58 BPM | OXYGEN SATURATION: 99 % | WEIGHT: 195 LBS | BODY MASS INDEX: 27.3 KG/M2 | TEMPERATURE: 98 F | SYSTOLIC BLOOD PRESSURE: 165 MMHG | DIASTOLIC BLOOD PRESSURE: 80 MMHG | HEIGHT: 71 IN

## 2024-11-22 ENCOUNTER — OFFICE VISIT (OUTPATIENT)
Facility: CLINIC | Age: 84
End: 2024-11-22
Payer: OTHER GOVERNMENT

## 2024-11-22 VITALS
BODY MASS INDEX: 27.32 KG/M2 | SYSTOLIC BLOOD PRESSURE: 121 MMHG | DIASTOLIC BLOOD PRESSURE: 71 MMHG | WEIGHT: 195.13 LBS | HEIGHT: 71 IN | HEART RATE: 56 BPM | TEMPERATURE: 98 F

## 2024-11-22 DIAGNOSIS — G89.29 CHRONIC BACK PAIN GREATER THAN 3 MONTHS DURATION: ICD-10-CM

## 2024-11-22 DIAGNOSIS — M48.061 SPINAL STENOSIS OF LUMBAR REGION WITHOUT NEUROGENIC CLAUDICATION: Primary | ICD-10-CM

## 2024-11-22 DIAGNOSIS — M54.9 CHRONIC BACK PAIN GREATER THAN 3 MONTHS DURATION: ICD-10-CM

## 2024-11-22 DIAGNOSIS — M47.816 LUMBAR SPONDYLOSIS: ICD-10-CM

## 2024-11-22 NOTE — PROGRESS NOTES
María Monet MD        PATIENT NAME: Mikel Jewell  : 1940  DATE: 24  MRN: [id number]      Billing Provider: María Monet MD  Level of Service:   Patient PCP Information       Provider PCP Type    David Kim Jr, MD General            Reason for Visit / Chief Complaint: Back Pain ((VA) Post-op bilateral TFESI L4 24, pt states he did not receive any relief, tylenol for relief, pain level 7/10)       Update PCP  Update Chief Complaint         History of Present Illness / Problem Focused Workflow     Mikel Jewell presents to the clinic with Back Pain ((VA) Post-op bilateral TFESI L4 24, pt states he did not receive any relief, tylenol for relief, pain level 7/10)     This is an 83-year-old male who presents to clinic today for follow up of low back pain that radiates into the hip and buttock that began after he fell last year.  He states that he slipped and fell on the concrete.  He saw Dr. Montalvo and was referred to physical therapy but states it did not help.  He was then referred to Dr. Richard in [place], who sent him to the hospital and he underwent a lumbar injection that did not help.  He does not like to take many medications, but will take half of a tizanidine every now and then when the pain gets too severe.  He also uses topical hempvana which helps a little bit.  He has not undergone any previous lumbar spine surgery.  He did some physical therapy and dry needling after I referred him at his initial appointment here 3 months ago.  He states that it did help with the pain on his right side, but he is still complaining of left-sided low back pain.  He underwent a urethrectomy in  and has been recovering from surgery since then.  He has not really been able to exercise much since the surgery.  His friend recommended a supplement that he would like to try.  He also uses ice packs for relief.  He underwent bilateral L4 transforaminal epidural steroid  injections couple of weeks ago.  Unfortunately, he did not get any relief from that.      Low-back Pain  Associated symptoms include chest pain (f/u with cardiology).   Follow-up  Associated symptoms include chest pain (f/u with cardiology) and fatigue.   Back Pain  Associated symptoms include chest pain (f/u with cardiology).       Review of Systems     Review of Systems   Constitutional:  Positive for fatigue.   Cardiovascular:  Positive for chest pain (f/u with cardiology).   Genitourinary:  Positive for hematuria (h/o cystectomy).   Musculoskeletal:  Positive for back pain.   All other systems reviewed and are negative.       Medical / Social / Family History     Past Medical History:   Diagnosis Date    Bladder cancer     Cancer of trigone of urinary bladder     Chronic back pain     Coronary artery disease     GERD (gastroesophageal reflux disease)     Hypertension     Osteoarthritis     Renal cancer     Testicular pain     Walker as ambulation aid        Past Surgical History:   Procedure Laterality Date    ATHERECTOMY  12/01/2023    Procedure: Atherectomy;  Surgeon: Tj Lovell MD;  Location: [facility] CATH LAB;  Service: Cardiology;;    CHOLECYSTECTOMY      COLONOSCOPY      CORONARY ANGIOPLASTY WITH STENT PLACEMENT      ESOPHAGOGASTRODUODENOSCOPY      HERNIA REPAIR Bilateral     IVUS, CORONARY  12/01/2023    Procedure: IVUS, Coronary;  Surgeon: Tj Lovell MD;  Location: [facility] CATH LAB;  Service: Cardiology;;    LEFT HEART CATHETERIZATION N/A 12/01/2023    Procedure: Left heart cath;  Surgeon: Tj Lovell MD;  Location: [facility] CATH LAB;  Service: Cardiology;  Laterality: N/A;    LEFT HEART CATHETERIZATION Left 12/18/2023    Procedure: Left heart cath;  Surgeon: Dalia Li MD;  Location: [facility] CATH LAB;  Service: Cardiology;  Laterality: Left;  LHC    NEPHRECTOMY Left     PENILE PROSTHESIS IMPLANT      PERCUTANEOUS CORONARY INTERVENTION, ARTERY N/A 12/01/2023    Procedure: Percutaneous coronary  intervention;  Surgeon: Tj Lovell MD;  Location: [facility] CATH LAB;  Service: Cardiology;  Laterality: N/A;    PROSTATE SURGERY  10/2022    Removal with bladder removal    Removal of bladder tumors      ROBOT-ASSISTED LAPAROSCOPIC CYSTECTOMY N/A 10/11/2022    Procedure: ROBOTIC CYSTECTOMY;  Surgeon: Flip Barrett MD;  Location: [facility] OR;  Service: Urology;  Laterality: N/A;  ROBOT ASSISTED CYSTECTOMY WITH URINARY DIVERSION //   ANY INDICATED PROCEDURES    ROBOT-ASSISTED LAPAROSCOPIC PROSTATECTOMY USING DA RASHEED XI  10/11/2022    Procedure: XI ROBOTIC PROSTATECTOMY;  Surgeon: Flip Barrett MD;  Location: [facility] OR;  Service: Urology;;    SMALL INTESTINE SURGERY  10/2022    Ileistomy    TRANSFORAMINAL EPIDURAL INJECTION OF STEROID Bilateral 11/7/2024    Procedure: INJECTION, STEROID, EPIDURAL, TRANSFORAMINAL APPROACH   ////Bilateral TFESI L4 (VA);  Surgeon: María Monet MD;  Location: [address] OR;  Service: Pain Management;  Laterality: Bilateral;  Bilateral TFESI L4 (VA)    TURBT, WITH BLUE LIGHT CYSTOSCOPY AND CYSVIEW      URETHRECTOMY N/A 6/26/2024    Procedure: URETHRECTOMY;  Surgeon: Cipriano Callejas MD;  Location: [facility];  Service: Urology;  Laterality: N/A;  WITH DR. CHESTER BARRETT    urostomy         Social History  Mr. Jewell  reports that he has quit smoking. His smoking use included cigarettes. He has a 22.5 pack-year smoking history. He has never used smokeless tobacco. He reports that he does not drink alcohol and does not use drugs.    Family History  Mr.'s Jewell family history includes Cancer in his brother; Depression in his mother; Diabetes in his mother.    Medications and Allergies     Medications  Outpatient Medications Marked as Taking for the 11/22/24 encounter (Office Visit) with María Monet MD   Medication Sig Dispense Refill    acetaminophen (TYLENOL) 325 MG tablet Take prn      amLODIPine (NORVASC) 2.5 MG tablet Take 1 tablet (2.5 mg total) by mouth once daily. 30  tablet 3    losartan (COZAAR) 50 MG tablet Take 50 mg by mouth once daily.         Allergies  Review of patient's allergies indicates:   Allergen Reactions    Atorvastatin      Other Reaction(s): Chest pain  [Snomed:[id number]]       Physical Examination     Vitals:    11/22/24 0827   BP: 121/71   Pulse: (!) 56   Temp: 97.8 °F (36.6 °C)     Pain Disability Index (PDI): 43       Spine Musculoskeletal Exam    Gait    Assistive device: cane    Inspection    Thoracolumbar    Thoracolumbar inspection is normal.    Palpation    Thoracolumbar    Tenderness: none    Right      Masses: none      Spasms: none      Crepitus: none      Muscle tone: normal    Left      Masses: none      Spasms: none      Crepitus: none      Muscle tone: normal    Range of Motion    Thoracolumbar        Thoracolumbar range of motion additional comments: Limited range of motion in the lumbar spine due to pain.    Strength    Thoracolumbar    Thoracolumbar motor exam is normal.       Sensory    Thoracolumbar    Thoracolumbar sensation is normal.    General      Constitutional: appears stated age, well-developed and well-nourished    Scleral icterus: no    Labored breathing: no    Psychiatric: normal mood and affect and no acute distress    Neurological: alert and oriented x3    Skin: intact    Lymphadenopathy: none       Assessment and Plan (including Health Maintenance)      Problem List  Smart Sets  Document Outside HM   :    Plan:   Spinal stenosis of lumbar region without neurogenic claudication  -     Ambulatory referral/consult to Neurosurgery; Future; Expected date: 11/29/2024    Lumbar spondylosis  -     Ambulatory referral/consult to Neurosurgery; Future; Expected date: 11/29/2024    Chronic back pain greater than 3 months duration  -     Ambulatory referral/consult to Neurosurgery; Future; Expected date: 11/29/2024           Problem List Items Addressed This Visit          Neuro    Spinal stenosis of lumbar region without neurogenic  claudication - Primary    Relevant Orders    Ambulatory referral/consult to Neurosurgery    Lumbar spondylosis    Relevant Orders    Ambulatory referral/consult to Neurosurgery       Orthopedic    Chronic back pain greater than 3 months duration    Relevant Orders    Ambulatory referral/consult to Neurosurgery         No future appointments.         There are no Patient Instructions on file for this visit.  No follow-ups on file.     Signature:  María Monet MD      Date of encounter: 11/22/24

## 2024-12-14 ENCOUNTER — HOSPITAL ENCOUNTER (OUTPATIENT)
Facility: HOSPITAL | Age: 84
Discharge: HOME | End: 2024-12-15
Attending: FAMILY MEDICINE | Admitting: INTERNAL MEDICINE
Payer: OTHER GOVERNMENT

## 2024-12-14 DIAGNOSIS — K56.7 ILEUS: Primary | ICD-10-CM

## 2024-12-14 DIAGNOSIS — R07.9 CHEST PAIN: ICD-10-CM

## 2024-12-14 DIAGNOSIS — D72.829 LEUKOCYTOSIS, UNSPECIFIED TYPE: ICD-10-CM

## 2024-12-14 LAB
ALBUMIN SERPL-MCNC: 3.9 G/DL (ref 3.4–4.8)
ALBUMIN/GLOB SERPL: 1.1 RATIO (ref 1.1–2)
ALP SERPL-CCNC: 116 UNIT/L (ref 40–150)
ALT SERPL-CCNC: 22 UNIT/L (ref 0–55)
ANION GAP SERPL CALC-SCNC: 9 MEQ/L
AST SERPL-CCNC: 22 UNIT/L (ref 5–34)
BACTERIA #/AREA URNS AUTO: ABNORMAL /HPF
BASOPHILS # BLD AUTO: 0.01 X10(3)/MCL
BASOPHILS NFR BLD AUTO: 0.1 %
BILIRUB SERPL-MCNC: 0.8 MG/DL
BILIRUB UR QL STRIP.AUTO: NEGATIVE
BUN SERPL-MCNC: 23.2 MG/DL (ref 8.4–25.7)
CALCIUM SERPL-MCNC: 9.3 MG/DL (ref 8.8–10)
CHLORIDE SERPL-SCNC: 104 MMOL/L (ref 98–107)
CLARITY UR: CLEAR
CO2 SERPL-SCNC: 25 MMOL/L (ref 23–31)
COLOR UR AUTO: ABNORMAL
CREAT SERPL-MCNC: 1.68 MG/DL (ref 0.72–1.25)
CREAT/UREA NIT SERPL: 14
EOSINOPHIL # BLD AUTO: 0.01 X10(3)/MCL (ref 0–0.9)
EOSINOPHIL NFR BLD AUTO: 0.1 %
ERYTHROCYTE [DISTWIDTH] IN BLOOD BY AUTOMATED COUNT: 12.9 % (ref 11.5–17)
GFR SERPLBLD CREATININE-BSD FMLA CKD-EPI: 40 ML/MIN/1.73/M2
GLOBULIN SER-MCNC: 3.6 GM/DL (ref 2.4–3.5)
GLUCOSE SERPL-MCNC: 105 MG/DL (ref 70–110)
GLUCOSE SERPL-MCNC: 150 MG/DL (ref 82–115)
GLUCOSE UR QL STRIP: NEGATIVE
HCT VFR BLD AUTO: 43.9 % (ref 42–52)
HGB BLD-MCNC: 14.6 G/DL (ref 14–18)
HGB UR QL STRIP: NEGATIVE
IMM GRANULOCYTES # BLD AUTO: 0.03 X10(3)/MCL (ref 0–0.04)
IMM GRANULOCYTES NFR BLD AUTO: 0.2 %
KETONES UR QL STRIP: NEGATIVE
LACTATE SERPL-SCNC: 2 MMOL/L (ref 0.5–2.2)
LEUKOCYTE ESTERASE UR QL STRIP: ABNORMAL
LYMPHOCYTES # BLD AUTO: 0.32 X10(3)/MCL (ref 0.6–4.6)
LYMPHOCYTES NFR BLD AUTO: 2.5 %
MCH RBC QN AUTO: 31.9 PG (ref 27–31)
MCHC RBC AUTO-ENTMCNC: 33.3 G/DL (ref 33–36)
MCV RBC AUTO: 95.9 FL (ref 80–94)
MONOCYTES # BLD AUTO: 0.59 X10(3)/MCL (ref 0.1–1.3)
MONOCYTES NFR BLD AUTO: 4.6 %
NEUTROPHILS # BLD AUTO: 11.83 X10(3)/MCL (ref 2.1–9.2)
NEUTROPHILS NFR BLD AUTO: 92.5 %
NITRITE UR QL STRIP: NEGATIVE
PH UR STRIP: 6 [PH]
PLATELET # BLD AUTO: 175 X10(3)/MCL (ref 130–400)
PMV BLD AUTO: 10.6 FL (ref 7.4–10.4)
POCT GLUCOSE: 105 MG/DL (ref 70–110)
POCT GLUCOSE: 120 MG/DL (ref 70–110)
POTASSIUM SERPL-SCNC: 4.9 MMOL/L (ref 3.5–5.1)
PROT SERPL-MCNC: 7.5 GM/DL (ref 5.8–7.6)
PROT UR QL STRIP: ABNORMAL
RBC # BLD AUTO: 4.58 X10(6)/MCL (ref 4.7–6.1)
RBC #/AREA URNS AUTO: ABNORMAL /HPF
SODIUM SERPL-SCNC: 138 MMOL/L (ref 136–145)
SP GR UR STRIP.AUTO: 1.01 (ref 1–1.03)
SQUAMOUS #/AREA URNS AUTO: ABNORMAL /HPF
UROBILINOGEN UR STRIP-ACNC: 0.2
WBC # BLD AUTO: 12.79 X10(3)/MCL (ref 4.5–11.5)
WBC #/AREA URNS AUTO: ABNORMAL /HPF

## 2024-12-14 PROCEDURE — 80053 COMPREHEN METABOLIC PANEL: CPT | Performed by: FAMILY MEDICINE

## 2024-12-14 PROCEDURE — G0378 HOSPITAL OBSERVATION PER HR: HCPCS

## 2024-12-14 PROCEDURE — 63600175 PHARM REV CODE 636 W HCPCS: Mod: JZ,TB | Performed by: INTERNAL MEDICINE

## 2024-12-14 PROCEDURE — 96365 THER/PROPH/DIAG IV INF INIT: CPT

## 2024-12-14 PROCEDURE — 94760 N-INVAS EAR/PLS OXIMETRY 1: CPT

## 2024-12-14 PROCEDURE — 87077 CULTURE AEROBIC IDENTIFY: CPT | Performed by: FAMILY MEDICINE

## 2024-12-14 PROCEDURE — 96361 HYDRATE IV INFUSION ADD-ON: CPT

## 2024-12-14 PROCEDURE — 63600175 PHARM REV CODE 636 W HCPCS: Performed by: FAMILY MEDICINE

## 2024-12-14 PROCEDURE — 83605 ASSAY OF LACTIC ACID: CPT | Performed by: FAMILY MEDICINE

## 2024-12-14 PROCEDURE — 96366 THER/PROPH/DIAG IV INF ADDON: CPT

## 2024-12-14 PROCEDURE — 25000003 PHARM REV CODE 250: Performed by: INTERNAL MEDICINE

## 2024-12-14 PROCEDURE — 85025 COMPLETE CBC W/AUTO DIFF WBC: CPT | Performed by: FAMILY MEDICINE

## 2024-12-14 PROCEDURE — 99900035 HC TECH TIME PER 15 MIN (STAT)

## 2024-12-14 PROCEDURE — 96367 TX/PROPH/DG ADDL SEQ IV INF: CPT

## 2024-12-14 PROCEDURE — 96372 THER/PROPH/DIAG INJ SC/IM: CPT | Performed by: INTERNAL MEDICINE

## 2024-12-14 PROCEDURE — 81003 URINALYSIS AUTO W/O SCOPE: CPT | Performed by: FAMILY MEDICINE

## 2024-12-14 PROCEDURE — 99285 EMERGENCY DEPT VISIT HI MDM: CPT | Mod: 25

## 2024-12-14 RX ORDER — SODIUM CHLORIDE 9 MG/ML
INJECTION, SOLUTION INTRAVENOUS CONTINUOUS
Status: DISCONTINUED | OUTPATIENT
Start: 2024-12-14 | End: 2024-12-14

## 2024-12-14 RX ORDER — AMITRIPTYLINE HYDROCHLORIDE 10 MG/1
10 TABLET, FILM COATED ORAL NIGHTLY PRN
COMMUNITY

## 2024-12-14 RX ORDER — ONDANSETRON HYDROCHLORIDE 2 MG/ML
4 INJECTION, SOLUTION INTRAVENOUS EVERY 6 HOURS PRN
Status: DISCONTINUED | OUTPATIENT
Start: 2024-12-14 | End: 2024-12-15 | Stop reason: HOSPADM

## 2024-12-14 RX ORDER — AMLODIPINE BESYLATE 2.5 MG/1
2.5 TABLET ORAL DAILY
Status: DISCONTINUED | OUTPATIENT
Start: 2024-12-14 | End: 2024-12-15 | Stop reason: HOSPADM

## 2024-12-14 RX ORDER — MORPHINE SULFATE 4 MG/ML
2 INJECTION, SOLUTION INTRAMUSCULAR; INTRAVENOUS EVERY 6 HOURS PRN
Status: DISCONTINUED | OUTPATIENT
Start: 2024-12-14 | End: 2024-12-15 | Stop reason: HOSPADM

## 2024-12-14 RX ORDER — HYDRALAZINE HYDROCHLORIDE 20 MG/ML
10 INJECTION INTRAMUSCULAR; INTRAVENOUS EVERY 4 HOURS PRN
Status: DISCONTINUED | OUTPATIENT
Start: 2024-12-14 | End: 2024-12-15 | Stop reason: HOSPADM

## 2024-12-14 RX ORDER — CIPROFLOXACIN 2 MG/ML
400 INJECTION, SOLUTION INTRAVENOUS
Status: DISCONTINUED | OUTPATIENT
Start: 2024-12-14 | End: 2024-12-15 | Stop reason: HOSPADM

## 2024-12-14 RX ORDER — GLUCAGON 1 MG
1 KIT INJECTION
Status: DISCONTINUED | OUTPATIENT
Start: 2024-12-14 | End: 2024-12-15 | Stop reason: HOSPADM

## 2024-12-14 RX ORDER — IBUPROFEN 200 MG
16 TABLET ORAL
Status: DISCONTINUED | OUTPATIENT
Start: 2024-12-14 | End: 2024-12-15 | Stop reason: HOSPADM

## 2024-12-14 RX ORDER — IPRATROPIUM BROMIDE AND ALBUTEROL SULFATE 2.5; .5 MG/3ML; MG/3ML
3 SOLUTION RESPIRATORY (INHALATION) EVERY 6 HOURS PRN
Status: DISCONTINUED | OUTPATIENT
Start: 2024-12-14 | End: 2024-12-15 | Stop reason: HOSPADM

## 2024-12-14 RX ORDER — LABETALOL HCL 20 MG/4 ML
10 SYRINGE (ML) INTRAVENOUS 4 TIMES DAILY PRN
Status: DISCONTINUED | OUTPATIENT
Start: 2024-12-14 | End: 2024-12-15 | Stop reason: HOSPADM

## 2024-12-14 RX ORDER — HYDROCODONE BITARTRATE AND ACETAMINOPHEN 5; 325 MG/1; MG/1
1 TABLET ORAL EVERY 6 HOURS PRN
Status: DISCONTINUED | OUTPATIENT
Start: 2024-12-14 | End: 2024-12-15 | Stop reason: HOSPADM

## 2024-12-14 RX ORDER — ACETAMINOPHEN 650 MG/1
650 SUPPOSITORY RECTAL EVERY 6 HOURS PRN
Status: DISCONTINUED | OUTPATIENT
Start: 2024-12-14 | End: 2024-12-15 | Stop reason: HOSPADM

## 2024-12-14 RX ORDER — LOSARTAN POTASSIUM 50 MG/1
50 TABLET ORAL DAILY
Status: DISCONTINUED | OUTPATIENT
Start: 2024-12-14 | End: 2024-12-15 | Stop reason: HOSPADM

## 2024-12-14 RX ORDER — ACETAMINOPHEN 325 MG/1
650 TABLET ORAL EVERY 4 HOURS PRN
Status: DISCONTINUED | OUTPATIENT
Start: 2024-12-14 | End: 2024-12-15 | Stop reason: HOSPADM

## 2024-12-14 RX ORDER — SODIUM CHLORIDE 9 MG/ML
INJECTION, SOLUTION INTRAVENOUS CONTINUOUS
Status: ACTIVE | OUTPATIENT
Start: 2024-12-14 | End: 2024-12-15

## 2024-12-14 RX ORDER — IBUPROFEN 200 MG
24 TABLET ORAL
Status: DISCONTINUED | OUTPATIENT
Start: 2024-12-14 | End: 2024-12-15 | Stop reason: HOSPADM

## 2024-12-14 RX ORDER — POLYETHYLENE GLYCOL 3350 17 G/17G
17 POWDER, FOR SOLUTION ORAL 3 TIMES DAILY PRN
Status: DISCONTINUED | OUTPATIENT
Start: 2024-12-14 | End: 2024-12-15 | Stop reason: HOSPADM

## 2024-12-14 RX ORDER — ALUMINUM HYDROXIDE, MAGNESIUM HYDROXIDE, AND SIMETHICONE 1200; 120; 1200 MG/30ML; MG/30ML; MG/30ML
30 SUSPENSION ORAL 4 TIMES DAILY PRN
Status: DISCONTINUED | OUTPATIENT
Start: 2024-12-14 | End: 2024-12-15 | Stop reason: HOSPADM

## 2024-12-14 RX ORDER — SODIUM CHLORIDE 0.9 % (FLUSH) 0.9 %
10 SYRINGE (ML) INJECTION
Status: DISCONTINUED | OUTPATIENT
Start: 2024-12-14 | End: 2024-12-15 | Stop reason: HOSPADM

## 2024-12-14 RX ORDER — BISACODYL 10 MG/1
10 SUPPOSITORY RECTAL DAILY PRN
Status: DISCONTINUED | OUTPATIENT
Start: 2024-12-14 | End: 2024-12-15 | Stop reason: HOSPADM

## 2024-12-14 RX ORDER — TALC
9 POWDER (GRAM) TOPICAL NIGHTLY PRN
Status: DISCONTINUED | OUTPATIENT
Start: 2024-12-14 | End: 2024-12-15 | Stop reason: HOSPADM

## 2024-12-14 RX ORDER — ENOXAPARIN SODIUM 100 MG/ML
40 INJECTION SUBCUTANEOUS EVERY 24 HOURS
Status: DISCONTINUED | OUTPATIENT
Start: 2024-12-14 | End: 2024-12-15 | Stop reason: HOSPADM

## 2024-12-14 RX ORDER — ONDANSETRON 4 MG/1
8 TABLET, ORALLY DISINTEGRATING ORAL EVERY 8 HOURS PRN
Status: DISCONTINUED | OUTPATIENT
Start: 2024-12-14 | End: 2024-12-15 | Stop reason: HOSPADM

## 2024-12-14 RX ORDER — SIMETHICONE 80 MG
1 TABLET,CHEWABLE ORAL 4 TIMES DAILY PRN
Status: DISCONTINUED | OUTPATIENT
Start: 2024-12-14 | End: 2024-12-15 | Stop reason: HOSPADM

## 2024-12-14 RX ORDER — METRONIDAZOLE 500 MG/100ML
500 INJECTION, SOLUTION INTRAVENOUS
Status: DISCONTINUED | OUTPATIENT
Start: 2024-12-14 | End: 2024-12-15 | Stop reason: HOSPADM

## 2024-12-14 RX ORDER — ONDANSETRON HYDROCHLORIDE 2 MG/ML
4 INJECTION, SOLUTION INTRAVENOUS EVERY 8 HOURS PRN
Status: DISCONTINUED | OUTPATIENT
Start: 2024-12-14 | End: 2024-12-15 | Stop reason: HOSPADM

## 2024-12-14 RX ORDER — CLONIDINE HYDROCHLORIDE 0.1 MG/1
0.2 TABLET ORAL EVERY 4 HOURS PRN
Status: DISCONTINUED | OUTPATIENT
Start: 2024-12-14 | End: 2024-12-15 | Stop reason: HOSPADM

## 2024-12-14 RX ORDER — NALOXONE HCL 0.4 MG/ML
0.02 VIAL (ML) INJECTION
Status: DISCONTINUED | OUTPATIENT
Start: 2024-12-14 | End: 2024-12-15 | Stop reason: HOSPADM

## 2024-12-14 RX ORDER — AMITRIPTYLINE HYDROCHLORIDE 10 MG/1
10 TABLET, FILM COATED ORAL NIGHTLY
Status: DISCONTINUED | OUTPATIENT
Start: 2024-12-14 | End: 2024-12-15 | Stop reason: HOSPADM

## 2024-12-14 RX ADMIN — AMITRIPTYLINE HYDROCHLORIDE 10 MG: 10 TABLET, FILM COATED ORAL at 08:12

## 2024-12-14 RX ADMIN — SIMETHICONE 80 MG: 80 TABLET, CHEWABLE ORAL at 07:12

## 2024-12-14 RX ADMIN — SODIUM CHLORIDE, POTASSIUM CHLORIDE, SODIUM LACTATE AND CALCIUM CHLORIDE 500 ML: 600; 310; 30; 20 INJECTION, SOLUTION INTRAVENOUS at 08:12

## 2024-12-14 RX ADMIN — METRONIDAZOLE 500 MG: 500 INJECTION, SOLUTION INTRAVENOUS at 01:12

## 2024-12-14 RX ADMIN — Medication 9 MG: at 09:12

## 2024-12-14 RX ADMIN — CIPROFLOXACIN 400 MG: 2 INJECTION, SOLUTION INTRAVENOUS at 11:12

## 2024-12-14 RX ADMIN — ENOXAPARIN SODIUM 40 MG: 40 INJECTION SUBCUTANEOUS at 04:12

## 2024-12-14 RX ADMIN — METRONIDAZOLE 500 MG: 500 INJECTION, SOLUTION INTRAVENOUS at 08:12

## 2024-12-14 RX ADMIN — SODIUM CHLORIDE: 9 INJECTION, SOLUTION INTRAVENOUS at 11:12

## 2024-12-14 RX ADMIN — SODIUM CHLORIDE: 9 INJECTION, SOLUTION INTRAVENOUS at 10:12

## 2024-12-14 RX ADMIN — HYDROCODONE BITARTRATE AND ACETAMINOPHEN 1 TABLET: 5; 325 TABLET ORAL at 11:12

## 2024-12-14 RX ADMIN — LOSARTAN POTASSIUM 50 MG: 50 TABLET, FILM COATED ORAL at 11:12

## 2024-12-14 RX ADMIN — AMLODIPINE BESYLATE 2.5 MG: 2.5 TABLET ORAL at 11:12

## 2024-12-14 RX ADMIN — ONDANSETRON 8 MG: 4 TABLET, ORALLY DISINTEGRATING ORAL at 07:12

## 2024-12-14 NOTE — ED PROVIDER NOTES
Encounter Date: 12/14/2024       History     Chief Complaint   Patient presents with    Abdominal Pain     Pt brought in by EMS with c/o abd pain and nausea since last night - denies vomiting or diarrhea      Pt is 85yo M with PMH bladder cancer s/p urethrectomy who present for abdominal pain that started last night 6pm. Pt states the ab pain is cramping, intermittent lower ab pain, rated 7-10/10. He last ate last night, a bowl of cereal and 3 prunes. Pt reports last BM was this AM. He has clear yellow urine output in his urethrectomy bag and denies changes in output recently. Pt endorses nausea. Pt denies f/c/v/d/c, CP, SOB, previous episodes. He denies melena, hematochezia. Wife at beside reports pt frequently has constipation and has to have suppositories for Bms. She reports that she had to administer an enema for the pt to have a BM this AM. Also note, pt is s/p nephrectomy, bladder removal and removal of 1 ureter.      Review of patient's allergies indicates:   Allergen Reactions    Atorvastatin      Other Reaction(s): Chest pain  [Snomed:[id number]]     Past Medical History:   Diagnosis Date    Bladder cancer     Cancer of trigone of urinary bladder     Chronic back pain     Coronary artery disease     GERD (gastroesophageal reflux disease)     Hypertension     Osteoarthritis     Renal cancer     Testicular pain     Walker as ambulation aid      Past Surgical History:   Procedure Laterality Date    ATHERECTOMY  12/01/2023    Procedure: Atherectomy;  Surgeon: Tj Lovell MD;  Location: [facility] CATH LAB;  Service: Cardiology;;    CHOLECYSTECTOMY      COLONOSCOPY      CORONARY ANGIOPLASTY WITH STENT PLACEMENT      ESOPHAGOGASTRODUODENOSCOPY      HERNIA REPAIR Bilateral     IVUS, CORONARY  12/01/2023    Procedure: IVUS, Coronary;  Surgeon: Tj Lovell MD;  Location: [facility] CATH LAB;  Service: Cardiology;;    LEFT HEART CATHETERIZATION N/A 12/01/2023    Procedure: Left heart cath;  Surgeon: Tj Lovell MD;   Location: [facility] CATH LAB;  Service: Cardiology;  Laterality: N/A;    LEFT HEART CATHETERIZATION Left 2023    Procedure: Left heart cath;  Surgeon: Dalia Li MD;  Location: [facility] CATH LAB;  Service: Cardiology;  Laterality: Left;  LHC    NEPHRECTOMY Left     PENILE PROSTHESIS IMPLANT      PERCUTANEOUS CORONARY INTERVENTION, ARTERY N/A 2023    Procedure: Percutaneous coronary intervention;  Surgeon: Tj Lovell MD;  Location: [facility] CATH LAB;  Service: Cardiology;  Laterality: N/A;    PROSTATE SURGERY  10/2022    Removal with bladder removal    Removal of bladder tumors      ROBOT-ASSISTED LAPAROSCOPIC CYSTECTOMY N/A 10/11/2022    Procedure: ROBOTIC CYSTECTOMY;  Surgeon: Flip Barrett MD;  Location: [facility];  Service: Urology;  Laterality: N/A;  ROBOT ASSISTED CYSTECTOMY WITH URINARY DIVERSION //   ANY INDICATED PROCEDURES    ROBOT-ASSISTED LAPAROSCOPIC PROSTATECTOMY USING DA RASHEED XI  10/11/2022    Procedure: XI ROBOTIC PROSTATECTOMY;  Surgeon: Flip Barrett MD;  Location: [facility];  Service: Urology;;    SMALL INTESTINE SURGERY  10/2022    Ileistomy    TRANSFORAMINAL EPIDURAL INJECTION OF STEROID Bilateral 2024    Procedure: INJECTION, STEROID, EPIDURAL, TRANSFORAMINAL APPROACH   ////Bilateral TFESI L4 (VA);  Surgeon: María Monet MD;  Location: [address];  Service: Pain Management;  Laterality: Bilateral;  Bilateral TFESI L4 (VA)    TURBT, WITH BLUE LIGHT CYSTOSCOPY AND CYSVIEW      URETHRECTOMY N/A 2024    Procedure: URETHRECTOMY;  Surgeon: Cipriano Callejas MD;  Location: [facility];  Service: Urology;  Laterality: N/A;  WITH DR. CHESTER BARRETT    urostomy       Family History   Problem Relation Name Age of Onset    Depression Mother Marley Hernandez Schexnider             Diabetes Mother Marley Hernandez Schexnider             Cancer Brother Mitchell Stafford Scheadriannamituljazmine              Social History     Tobacco Use    Smoking status: Former     Current  packs/day: 1.50     Average packs/day: 1.5 packs/day for 15.0 years (22.5 ttl pk-yrs)     Types: Cigarettes    Smokeless tobacco: Never    Tobacco comments:     Dont remember dates. - appx dates 4368-1721   Substance Use Topics    Alcohol use: Never    Drug use: Never     Review of Systems    Physical Exam     Initial Vitals [12/14/24 0621]   BP Pulse Resp Temp SpO2   124/80 82 17 98.5 °F (36.9 °C) 96 %      MAP       --         Physical Exam    Vitals reviewed.  Constitutional: He appears well-developed and well-nourished.   HENT:   Head: Normocephalic and atraumatic.   Cardiovascular:  Normal rate, regular rhythm and normal heart sounds.     Exam reveals no gallop and no friction rub.       No murmur heard.  Pulmonary/Chest: Breath sounds normal. He has no wheezes. He has no rhonchi. He has no rales.   Abdominal: Abdomen is soft. He exhibits distension. He exhibits no mass. There is abdominal tenderness.   Hyperactive, tinkling bowel sounds throughout  Urethectomy bag in place on R side with clear yellow urine in bag - C/D/I There is no rebound and no guarding.   Musculoskeletal:         General: Normal range of motion.     Neurological: He is alert.   Skin: Skin is warm and dry.   Psychiatric: He has a normal mood and affect.         ED Course   Procedures  Labs Reviewed   COMPREHENSIVE METABOLIC PANEL - Abnormal       Result Value    Sodium 138      Potassium 4.9      Chloride 104      CO2 25      Glucose 150 (*)     Blood Urea Nitrogen 23.2      Creatinine 1.68 (*)     Calcium 9.3      Protein Total 7.5      Albumin 3.9      Globulin 3.6 (*)     Albumin/Globulin Ratio 1.1      Bilirubin Total 0.8            ALT 22      AST 22      eGFR 40      Anion Gap 9.0      BUN/Creatinine Ratio 14     CBC WITH DIFFERENTIAL - Abnormal    WBC 12.79 (*)     RBC 4.58 (*)     Hgb 14.6      Hct 43.9      MCV 95.9 (*)     MCH 31.9 (*)     MCHC 33.3      RDW 12.9      Platelet 175      MPV 10.6 (*)     Neut % 92.5       Lymph % 2.5      Mono % 4.6      Eos % 0.1      Basophil % 0.1      Lymph # 0.32 (*)     Neut # 11.83 (*)     Mono # 0.59      Eos # 0.01      Baso # 0.01      IG# 0.03      IG% 0.2     CBC W/ AUTO DIFFERENTIAL    Narrative:     The following orders were created for panel order CBC auto differential.  Procedure                               Abnormality         Status                     ---------                               -----------         ------                     CBC with Differential[[id number]]       Abnormal            Final result                 Please view results for these tests on the individual orders.   URINALYSIS, REFLEX TO URINE CULTURE   LACTIC ACID, PLASMA   URINALYSIS, MICROSCOPIC          Imaging Results              CT Abdomen Pelvis  Without Contrast (Final result)  Result time 12/14/24 07:36:17      Final result by Matteo Harris MD (12/14/24 07:36:17)                   Impression:      Fluid-filled distended stomach and small bowel loops throughout the abdomen more significant distally, fluid-filled distended proximal colon.  Findings could indicate ileus or possible gastroenteritis.  This is an interval change from the prior exam.    No evidence of malignancy.    Left-sided nephrectomy, right-sided ureteral diversion.      Electronically signed by: Yoni Harris MD  Date:    12/14/2024  Time:    07:36               Narrative:    EXAMINATION:  CT ABDOMEN PELVIS WITHOUT CONTRAST    CLINICAL HISTORY:  Bowel obstruction suspected;Abdominal pain, acute, nonlocalized;    TECHNIQUE:  Low dose axial images, sagittal and coronal reformations were obtained from the lung bases to the pubic symphysis.  .  Oral contrast not given.  DLP 1032.  Automated exposure control used.    COMPARISON:  07/17/2023    FINDINGS:  Liver normal density with no focal lesion.    Cholecystectomy.  Biliary ductal system normal.    Pancreas normal.  Stomach distended.  Spleen, adrenal glands normal.    Left-sided  nephrectomy.  Right kidney normal size and contour with no nephrolithiasis or hydronephrosis.  Ureteral diversion with ileostomy on the right.    Aorta tapers normally.  Moderate atherosclerotic calcification.    Small bowel loops are generally fluid-filled and distended throughout the abdomen more significant in the region of the mid and distal ileum.  Proximal colon including the ascending and transverse colon is fluid-filled.  Distal colon normal.    Mesentery normal with no inflammatory change or ascites.  No lymphadenopathy in the abdomen or pelvis.    Penile implant in place.  Rectum normal.    Bones intact with no evidence of malignancy.    Lung bases clear.                                       Medications   lactated ringers bolus 500 mL (has no administration in time range)     Medical Decision Making  Overall 85 yo F with acute onset of abdominal pain with PMH of bladder cancer and urethostomy in place, last BM with AM. On PE, pt with TTP in epigastric region, ab distension, hyperactive, tinkling bowel sounds throughout.  Differential Diagnosis:   Bowel obstruction, UTI, ileus, gastroenteritis  ED Management:  VSSAF  On PE, pt with TTP in epigastric region, ab distension, hyperactive, tinkling bowel sounds throughout.  Pt appears to be in no distress   CBC, CMP, UA, CTAP ordered. CBC with WBC 12. CMP with Cr 1.68, baseline 1.4.   Pt given IVF in ED. CTAP showed bowel distension with fluid-filled lopps of bowel, concerning for ileus vs gastroenteritis given pt's WBC 12. Rec admission of pt for observation at this time given elevated Cr and WBC with GI etiology. Pt placed NPO. Spoke with [facility], Dr. Zaragoza who agreed with admission.    Amount and/or Complexity of Data Reviewed  External Data Reviewed: labs.     Details: Reviewed past CMP and BMP for Cr baselin  Labs: ordered.     Details: CBC, CMP, UA  Radiology: ordered. Decision-making details documented in ED Course.     Details: CTAP     Risk  Decision  regarding hospitalization.               ED Course as of 12/14/24 0834   Sat Dec 14, 2024   0751 CT Abdomen Pelvis  Without Contrast [TH]      ED Course User Index  [TH] Nery Duggan MD                           Clinical Impression:  Final diagnoses:  [K56.7] Ileus (Primary)  [D72.829] Leukocytosis, unspecified type          ED Disposition Condition    Admit Stable                Nery Duggan MD  12/14/24 0834
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
## 2024-12-14 NOTE — Clinical Note
Diagnosis: Ileus [[id number]]  Future Attending Provider: BERHANE ENGLAND [[id number]]  Special Needs:: No Special Needs [1]

## 2024-12-14 NOTE — H&P
[facility]  [facility] MEDICINE - H&P ADMISSION NOTE      Patient Name: Mikel Jewell  MRN: [id number]  Patient Class: OP- Observation   Admission Date: 12/14/2024   Admitting Physician:  Service   Attending Physician: Bisi Zaragoza MD  Primary Care Provider: David Kim Jr., MD  Face-to-Face encounter date: 12/14/2024        CHIEF COMPLAINT     Chief Complaint   Patient presents with    Abdominal Pain     Pt brought in by EMS with c/o abd pain and nausea since last night - denies vomiting or diarrhea        HISTORY OF PRESENTING ILLNESS   Full Code    Pt is 83yo M with PMH bladder cancer s/p urethrectomy who present for abdominal pain that started last night 6pm. Pt states the ab pain is cramping, intermittent lower ab pain, rated 7-10/10. He last ate last night, a bowl of cereal and 3 prunes. Pt reports last BM was this AM. He has clear yellow urine output in his urethrectomy bag and denies changes in output recently. Pt endorses nausea. Pt denies f/c/v/d/c, CP, SOB, previous episodes. He denies melena, hematochezia. Wife at beside reports pt frequently has constipation and has to have suppositories for Bms. She reports that she had to administer an enema for the pt to have a BM this AM. Also note, pt is s/p nephrectomy, bladder removal and removal of 1 ureter.     Patient is being admitted for observation.   Will start on abx for UTI and enteritis. Clear liquids    Social drivers: I have screed the patient for housing insecurity, food insecurity, access to follow up appointments as well as issues with safety at home and have not identified them at this time but will address as needs arise.       PAST MEDICAL HISTORY     Past Medical History:   Diagnosis Date    Bladder cancer     Cancer of trigone of urinary bladder     Chronic back pain     Coronary artery disease     GERD (gastroesophageal reflux disease)     Hypertension     Osteoarthritis     Renal cancer      Testicular pain     Walker as ambulation aid        PAST SURGICAL HISTORY     Past Surgical History:   Procedure Laterality Date    ATHERECTOMY  12/01/2023    Procedure: Atherectomy;  Surgeon: Tj Lovell MD;  Location: [facility] CATH LAB;  Service: Cardiology;;    CHOLECYSTECTOMY      COLONOSCOPY      CORONARY ANGIOPLASTY WITH STENT PLACEMENT      ESOPHAGOGASTRODUODENOSCOPY      HERNIA REPAIR Bilateral     IVUS, CORONARY  12/01/2023    Procedure: IVUS, Coronary;  Surgeon: Tj Lovell MD;  Location: [facility] CATH LAB;  Service: Cardiology;;    LEFT HEART CATHETERIZATION N/A 12/01/2023    Procedure: Left heart cath;  Surgeon: Tj Lovell MD;  Location: [facility] CATH LAB;  Service: Cardiology;  Laterality: N/A;    LEFT HEART CATHETERIZATION Left 12/18/2023    Procedure: Left heart cath;  Surgeon: Dalia Li MD;  Location: [facility] CATH LAB;  Service: Cardiology;  Laterality: Left;  [facility]    NEPHRECTOMY Left     PENILE PROSTHESIS IMPLANT      PERCUTANEOUS CORONARY INTERVENTION, ARTERY N/A 12/01/2023    Procedure: Percutaneous coronary intervention;  Surgeon: Tj Lovell MD;  Location: [facility] CATH LAB;  Service: Cardiology;  Laterality: N/A;    PROSTATE SURGERY  10/2022    Removal with bladder removal    Removal of bladder tumors      ROBOT-ASSISTED LAPAROSCOPIC CYSTECTOMY N/A 10/11/2022    Procedure: ROBOTIC CYSTECTOMY;  Surgeon: Flip Casas MD;  Location: [facility];  Service: Urology;  Laterality: N/A;  ROBOT ASSISTED CYSTECTOMY WITH URINARY DIVERSION //   ANY INDICATED PROCEDURES    ROBOT-ASSISTED LAPAROSCOPIC PROSTATECTOMY USING DA RASHEED XI  10/11/2022    Procedure: XI ROBOTIC PROSTATECTOMY;  Surgeon: Flip Casas MD;  Location: [facility];  Service: Urology;;    SMALL INTESTINE SURGERY  10/2022    Ileistomy    TRANSFORAMINAL EPIDURAL INJECTION OF STEROID Bilateral 11/7/2024    Procedure: INJECTION, STEROID, EPIDURAL, TRANSFORAMINAL APPROACH   ////Bilateral TFESI L4 ([place]);  Surgeon: María Monet MD;   [address];  Service: Pain Management;  Laterality: Bilateral;  Bilateral TFESI L4 ([place])    TURBT, WITH BLUE LIGHT CYSTOSCOPY AND CYSVIEW      URETHRECTOMY N/A 6/26/2024    Procedure: URETHRECTOMY;  Surgeon: Cipriano Callejas MD;  Location: [facility] OR;  Service: Urology;  Laterality: N/A;  WITH DR. CHESTER BARRETT    urostomy         FAMILY HISTORY   Reviewed and noncontributory to this case    SOCIAL HISTORY     Social History     Socioeconomic History    Marital status:    Tobacco Use    Smoking status: Former     Current packs/day: 1.50     Average packs/day: 1.5 packs/day for 15.0 years (22.5 ttl pk-yrs)     Types: Cigarettes    Smokeless tobacco: Never    Tobacco comments:     Dont remember dates. - appx dates 5986-8775   Substance and Sexual Activity    Alcohol use: Never    Drug use: Never    Sexual activity: Yes     Partners: Female     Birth control/protection: Post-menopausal       HOME MEDICATIONS     Prior to Admission medications    Medication Sig Start Date End Date Taking? Authorizing Provider   acetaminophen (TYLENOL) 325 MG tablet Take prn    Provider, Historical   amitriptyline (ELAVIL) 10 MG tablet Take 1 tablet (10 mg total) by mouth nightly as needed for Pain. 10/16/24   María Monet MD   amitriptyline (ELAVIL) 10 MG tablet Take 10 mg by mouth nightly as needed for Pain.    Provider, Historical   amLODIPine (NORVASC) 2.5 MG tablet Take 1 tablet (2.5 mg total) by mouth once daily. 12/10/23 12/9/24  Norma Mitchell NP   losartan (COZAAR) 50 MG tablet Take 50 mg by mouth once daily.    Provider, Historical   tiZANidine (ZANAFLEX) 4 MG tablet Take 1 tablet (4 mg total) by mouth 3 (three) times daily as needed (muscle spasms/pain). 10/4/24   María Monet MD       ALLERGIES   Atorvastatin          REVIEW OF SYSTEMS   Except as documented above, all other systems reviewed and negative    PHYSICAL EXAM     Vitals:    12/14/24 1010   BP: 131/66   Pulse: 71   Resp: 18   Temp: 97.3 °F  (36.3 °C)      General:  In no acute distress, resting comfortably  Head and neck:  Atraumatic, normocephalic, moist mucous membranes, supple neck  Chest:  Clear to auscultation bilaterally  Heart:  S1, S2, no appreciable murmur  Abdomen:  Soft, nontender, BS +  MSK:  Warm, no lower extremity edema, no clubbing or cyanosis  Neuro:  Alert and oriented x4, moving all extremities with good strength  Integumentary:  No obvious skin rash  Psychiatry:  Appropriate mood and affect          ASSESSMENT AND PLAN   There are no hospital problems to display for this patient.     Illeus vs gastroenteritis/UTI  Cipro/flagyl  Clear liquid   IVF x 1 L    HO bladder cancer  CAD  HTN  Back pain      DVT prophylaxis:  Lovenox  __________________________________________________________________  LABS/MICRO/MEDS/DIAGNOSTICS       LABS  Recent Labs     12/14/24  0707      K 4.9   CO2 25   BUN 23.2   CREATININE 1.68*   GLUCOSE 150*   CALCIUM 9.3   ALKPHOS 116   AST 22   ALT 22   ALBUMIN 3.9     Recent Labs     12/14/24  0707   WBC 12.79*   RBC 4.58*   HCT 43.9   MCV 95.9*          MICROBIOLOGY  Microbiology Results (last 7 days)       Procedure Component Value Units Date/Time    Urine Culture High Risk [[id number]]     Order Status: Sent Specimen: Urine, Clean Catch     Urine culture [[id number]] Collected: 12/14/24 0821    Order Status: Sent Specimen: Urine Updated: 12/14/24 0845            MEDICATIONS   ceFEPime (MAXIPIME) IVPB EXTENDED INFUSION  1 g Intravenous Q8H    enoxparin  40 mg Subcutaneous Daily      INFUSIONS   0.9% NaCl   Intravenous Continuous           DIAGNOSTIC TESTS  CT Abdomen Pelvis  Without Contrast   Final Result      Fluid-filled distended stomach and small bowel loops throughout the abdomen more significant distally, fluid-filled distended proximal colon.  Findings could indicate ileus or possible gastroenteritis.  This is an interval change from the prior exam.      No evidence of malignancy.       Left-sided nephrectomy, right-sided ureteral diversion.         Electronically signed by: Yoni Harris MD   Date:    12/14/2024   Time:    07:36             Patient information was obtained from patient, patient's family, past medical records and ER records.   All diagnosis and differential diagnosis have been reviewed; assessment and plan has been documented. I have personally reviewed the labs and test results that are presently available; I have reviewed the patients medication list. I have reviewed the consulting providers response and recommendations. I have reviewed or attempted to review medical records based upon their availability.  All of the patient's questions have been addressed and answered. Patient's is agreeable to the above stated plan. I will continue to monitor closely and make adjustments to medical management as needed.  This note was created using [employer] voice recognition software that occasionally misinterpreted phrases or words.  Please contact me if any questions may rise regarding documentation to clarify verbiage.        Bisi Zaragoza MD   Internal Medicine  Department of Hospital Medicine [facility]

## 2024-12-15 VITALS
HEART RATE: 60 BPM | SYSTOLIC BLOOD PRESSURE: 123 MMHG | BODY MASS INDEX: 27.62 KG/M2 | RESPIRATION RATE: 18 BRPM | TEMPERATURE: 98 F | HEIGHT: 71 IN | WEIGHT: 197.31 LBS | DIASTOLIC BLOOD PRESSURE: 66 MMHG | OXYGEN SATURATION: 97 %

## 2024-12-15 PROBLEM — K56.7 ILEUS: Status: ACTIVE | Noted: 2024-12-15

## 2024-12-15 LAB
ALBUMIN SERPL-MCNC: 2.7 G/DL (ref 3.4–4.8)
ALBUMIN/GLOB SERPL: 1 RATIO (ref 1.1–2)
ALP SERPL-CCNC: 82 UNIT/L (ref 40–150)
ALT SERPL-CCNC: 18 UNIT/L (ref 0–55)
ANION GAP SERPL CALC-SCNC: 5 MEQ/L
AST SERPL-CCNC: 19 UNIT/L (ref 5–34)
BASOPHILS # BLD AUTO: 0.01 X10(3)/MCL
BASOPHILS NFR BLD AUTO: 0.2 %
BILIRUB SERPL-MCNC: 0.7 MG/DL
BUN SERPL-MCNC: 18.3 MG/DL (ref 8.4–25.7)
CALCIUM SERPL-MCNC: 8.1 MG/DL (ref 8.8–10)
CHLORIDE SERPL-SCNC: 105 MMOL/L (ref 98–107)
CO2 SERPL-SCNC: 25 MMOL/L (ref 23–31)
CREAT SERPL-MCNC: 1.26 MG/DL (ref 0.72–1.25)
CREAT/UREA NIT SERPL: 15
EOSINOPHIL # BLD AUTO: 0.08 X10(3)/MCL (ref 0–0.9)
EOSINOPHIL NFR BLD AUTO: 1.8 %
ERYTHROCYTE [DISTWIDTH] IN BLOOD BY AUTOMATED COUNT: 12.9 % (ref 11.5–17)
GFR SERPLBLD CREATININE-BSD FMLA CKD-EPI: 57 ML/MIN/1.73/M2
GLOBULIN SER-MCNC: 2.7 GM/DL (ref 2.4–3.5)
GLUCOSE SERPL-MCNC: 95 MG/DL (ref 70–110)
GLUCOSE SERPL-MCNC: 95 MG/DL (ref 82–115)
HCT VFR BLD AUTO: 31.5 % (ref 42–52)
HGB BLD-MCNC: 10.6 G/DL (ref 14–18)
IMM GRANULOCYTES # BLD AUTO: 0 X10(3)/MCL (ref 0–0.04)
IMM GRANULOCYTES NFR BLD AUTO: 0 %
LYMPHOCYTES # BLD AUTO: 1 X10(3)/MCL (ref 0.6–4.6)
LYMPHOCYTES NFR BLD AUTO: 22.2 %
MAGNESIUM SERPL-MCNC: 1.8 MG/DL (ref 1.6–2.6)
MCH RBC QN AUTO: 32.7 PG (ref 27–31)
MCHC RBC AUTO-ENTMCNC: 33.7 G/DL (ref 33–36)
MCV RBC AUTO: 97.2 FL (ref 80–94)
MONOCYTES # BLD AUTO: 0.41 X10(3)/MCL (ref 0.1–1.3)
MONOCYTES NFR BLD AUTO: 9.1 %
NEUTROPHILS # BLD AUTO: 3 X10(3)/MCL (ref 2.1–9.2)
NEUTROPHILS NFR BLD AUTO: 66.7 %
PHOSPHATE SERPL-MCNC: 3 MG/DL (ref 2.3–4.7)
PLATELET # BLD AUTO: 125 X10(3)/MCL (ref 130–400)
PMV BLD AUTO: 11.1 FL (ref 7.4–10.4)
POCT GLUCOSE: 95 MG/DL (ref 70–110)
POTASSIUM SERPL-SCNC: 4.5 MMOL/L (ref 3.5–5.1)
PROT SERPL-MCNC: 5.4 GM/DL (ref 5.8–7.6)
RBC # BLD AUTO: 3.24 X10(6)/MCL (ref 4.7–6.1)
SODIUM SERPL-SCNC: 135 MMOL/L (ref 136–145)
WBC # BLD AUTO: 4.5 X10(3)/MCL (ref 4.5–11.5)

## 2024-12-15 PROCEDURE — G0378 HOSPITAL OBSERVATION PER HR: HCPCS

## 2024-12-15 PROCEDURE — 96366 THER/PROPH/DIAG IV INF ADDON: CPT

## 2024-12-15 PROCEDURE — 36415 COLL VENOUS BLD VENIPUNCTURE: CPT | Performed by: INTERNAL MEDICINE

## 2024-12-15 PROCEDURE — 85025 COMPLETE CBC W/AUTO DIFF WBC: CPT | Performed by: INTERNAL MEDICINE

## 2024-12-15 PROCEDURE — 83735 ASSAY OF MAGNESIUM: CPT | Performed by: INTERNAL MEDICINE

## 2024-12-15 PROCEDURE — 80053 COMPREHEN METABOLIC PANEL: CPT | Performed by: INTERNAL MEDICINE

## 2024-12-15 PROCEDURE — 25000003 PHARM REV CODE 250: Performed by: INTERNAL MEDICINE

## 2024-12-15 PROCEDURE — 63600175 PHARM REV CODE 636 W HCPCS: Mod: JZ,TB | Performed by: INTERNAL MEDICINE

## 2024-12-15 PROCEDURE — 84100 ASSAY OF PHOSPHORUS: CPT | Performed by: INTERNAL MEDICINE

## 2024-12-15 RX ORDER — AMLODIPINE BESYLATE 2.5 MG/1
2.5 TABLET ORAL DAILY
Qty: 30 TABLET | Refills: 0 | Status: SHIPPED | OUTPATIENT
Start: 2024-12-15 | End: 2025-01-14

## 2024-12-15 RX ORDER — ONDANSETRON 4 MG/1
4 TABLET, ORALLY DISINTEGRATING ORAL EVERY 6 HOURS PRN
Qty: 13 TABLET | Refills: 0 | Status: SHIPPED | OUTPATIENT
Start: 2024-12-15 | End: 2024-12-20

## 2024-12-15 RX ORDER — LOSARTAN POTASSIUM 50 MG/1
50 TABLET ORAL DAILY
Qty: 30 TABLET | Refills: 0 | Status: SHIPPED | OUTPATIENT
Start: 2024-12-15 | End: 2024-12-15

## 2024-12-15 RX ORDER — LOSARTAN POTASSIUM 50 MG/1
50 TABLET ORAL DAILY
Qty: 30 TABLET | Refills: 0 | Status: SHIPPED | OUTPATIENT
Start: 2024-12-15 | End: 2025-01-14

## 2024-12-15 RX ORDER — CIPROFLOXACIN 500 MG/1
500 TABLET ORAL EVERY 12 HOURS
Qty: 14 TABLET | Refills: 0 | Status: SHIPPED | OUTPATIENT
Start: 2024-12-15 | End: 2024-12-22

## 2024-12-15 RX ORDER — ONDANSETRON 4 MG/1
4 TABLET, ORALLY DISINTEGRATING ORAL EVERY 6 HOURS PRN
Qty: 13 TABLET | Refills: 0 | Status: SHIPPED | OUTPATIENT
Start: 2024-12-15 | End: 2024-12-15

## 2024-12-15 RX ORDER — AMLODIPINE BESYLATE 2.5 MG/1
2.5 TABLET ORAL DAILY
Qty: 30 TABLET | Refills: 0 | Status: SHIPPED | OUTPATIENT
Start: 2024-12-15 | End: 2024-12-15

## 2024-12-15 RX ORDER — CIPROFLOXACIN 500 MG/1
500 TABLET ORAL EVERY 12 HOURS
Qty: 14 TABLET | Refills: 0 | Status: SHIPPED | OUTPATIENT
Start: 2024-12-15 | End: 2024-12-15

## 2024-12-15 RX ADMIN — METRONIDAZOLE 500 MG: 500 INJECTION, SOLUTION INTRAVENOUS at 03:12

## 2024-12-15 RX ADMIN — SIMETHICONE 80 MG: 80 TABLET, CHEWABLE ORAL at 09:12

## 2024-12-15 RX ADMIN — AMLODIPINE BESYLATE 2.5 MG: 2.5 TABLET ORAL at 09:12

## 2024-12-15 RX ADMIN — LOSARTAN POTASSIUM 50 MG: 50 TABLET, FILM COATED ORAL at 09:12

## 2024-12-15 NOTE — NURSING
Patient and wife were given discharge instructions and verbalized understanding. All questions were answered. Patient IV was discontinued. Patient left via personal vehicle.

## 2024-12-15 NOTE — PLAN OF CARE
Problem: Adult Inpatient Plan of Care  Goal: Plan of Care Review  Outcome: Progressing  Flowsheets (Taken 12/14/2024 2000)  Plan of Care Reviewed With: patient  Goal: Patient-Specific Goal (Individualized)  Outcome: Progressing  Flowsheets (Taken 12/14/2024 2000)  Individualized Care Needs:   Safety with mobility to prevent injury   Pain management   Clear liquids,accurate I&O   IV hydration and antibiotic therapy   Monitor for s/s of worsening infection   Promote sleep.  Anxieties, Fears or Concerns: Hoping to be able to sleep tonight, didn't sleep last night at home.  Patient/Family-Specific Goals (Include Timeframe): Improved condition by AVIVA for return home with self/family care.  Goal: Absence of Hospital-Acquired Illness or Injury  Outcome: Progressing  Intervention: Prevent Skin Injury  Flowsheets (Taken 12/14/2024 2000)  Skin Protection:   incontinence pads utilized   protective footwear used  Intervention: Prevent and Manage VTE (Venous Thromboembolism) Risk  Flowsheets (Taken 12/14/2024 2000)  VTE Prevention/Management: (On Lovenox)   bleeding risk assessed   bleeding precautions maintained   ambulation promoted   dorsiflexion/plantar flexion performed   fluids promoted   intravenous hydration   ROM (active) performed  Goal: Optimal Comfort and Wellbeing  Outcome: Progressing  Intervention: Monitor Pain and Promote Comfort  Flowsheets (Taken 12/14/2024 2000)  Pain Management Interventions:   care clustered   pain management plan reviewed with patient/caregiver   medication offered   position adjusted   quiet environment facilitated   relaxation techniques promoted  Intervention: Provide Person-Centered Care  Flowsheets (Taken 12/14/2024 2000)  Trust Relationship/Rapport:   care explained   choices provided   emotional support provided   empathic listening provided   questions answered   questions encouraged   reassurance provided   thoughts/feelings acknowledged     Problem: Infection  Goal: Absence of  Infection Signs and Symptoms  Outcome: Progressing  Intervention: Prevent or Manage Infection  Flowsheets (Taken 12/14/2024 2000)  Infection Management: aseptic technique maintained     Problem: Wound  Goal: Optimal Coping  Outcome: Progressing  Intervention: Support Patient and Family Response  Flowsheets (Taken 12/14/2024 2000)  Supportive Measures:   active listening utilized   positive reinforcement provided   self-care encouraged   verbalization of feelings encouraged  Family/Support System Care: self-care encouraged

## 2024-12-15 NOTE — DISCHARGE SUMMARY
[facility]  HOSPITAL MEDICINE - DISCHARGE SUMMARY    Patient Name: Mikel Jewell  MRN: [id number]  Admission Date: 12/14/2024  Discharge Date: 12/15/2024  Hospital Length of Stay: 0 days  Discharge Provider: Bisi Zaragoza MD  Primary Care Provider: David Kim Jr., MD      HOSPITAL COURSE         Pt is 83yo M with PMH bladder cancer s/p urethrectomy who present for abdominal pain that started last night 6pm. Pt states the ab pain is cramping, intermittent lower ab pain, rated 7-10/10. He last ate last night, a bowl of cereal and 3 prunes. Pt reports last BM was this AM. He has clear yellow urine output in his urethrectomy bag and denies changes in output recently. Pt endorses nausea. Pt denies f/c/v/d/c, CP, SOB, previous episodes. He denies melena, hematochezia. Wife at beside reports pt frequently has constipation and has to have suppositories for Bms. She reports that she had to administer an enema for the pt to have a BM this AM. Also note, pt is s/p nephrectomy, bladder removal and removal of 1 ureter.      Patient is being admitted for observation.   Will start on abx for UTI and enteritis. Clear liquids     Social drivers: I have screed the patient for housing insecurity, food insecurity, access to follow up appointments as well as issues with safety at home and have not identified them at this time but will address as needs arise.        Urine with E coli, patient tolerating solid foods. No N/V.  Positive bowel signs. Passing gas.   DC on Cipro.     PHYSICAL EXAM     Most Recent Vital Signs:  Temp: 98 °F (36.7 °C) (12/15/24 0707)  Pulse: 62 (12/15/24 0707)  Resp: 18 (12/15/24 0328)  BP: 123/66 (12/15/24 0707)  SpO2: 97 % (12/15/24 0707)   GENERAL: In no acute distress, afebrile  HEENT:  CHEST: Clear to auscultation bilaterally  HEART: S1, S2, no appreciable murmur  ABDOMEN: Soft, nontender, BS +  MSK: Warm, no lower extremity edema, no clubbing or  cyanosis  NEUROLOGIC: Alert and oriented x4, moving all extremities with good strength   INTEGUMENTARY:  PSYCHIATRY:          DISCHARGE DIAGNOSIS     Active Hospital Problems    Diagnosis  POA    *Ileus [K56.7]  Yes      Resolved Hospital Problems   No resolved problems to display.            Illeus vs gastroenteritis/UTI  Cipro/flagyl  Clear liquid   IVF x 1 L     HO bladder cancer  CAD  HTN  Back pain        DVT prophylaxis:  Lovenox  ___________________________  _____________________________________________________________________________      DISCHARGE MED REC     Current Discharge Medication List        START taking these medications    Details   ciprofloxacin HCl (CIPRO) 500 MG tablet Take 1 tablet (500 mg total) by mouth every 12 (twelve) hours. for 7 days  Qty: 14 tablet, Refills: 0      ondansetron (ZOFRAN-ODT) 4 MG TbDL Take 1 tablet (4 mg total) by mouth every 6 (six) hours as needed (nause/vomiting).  Qty: 13 tablet, Refills: 0           CONTINUE these medications which have CHANGED    Details   amLODIPine (NORVASC) 2.5 MG tablet Take 1 tablet (2.5 mg total) by mouth once daily.  Qty: 30 tablet, Refills: 0    Comments: .      losartan (COZAAR) 50 MG tablet Take 1 tablet (50 mg total) by mouth once daily.  Qty: 30 tablet, Refills: 0    Comments: .           CONTINUE these medications which have NOT CHANGED    Details   acetaminophen (TYLENOL) 325 MG tablet Take prn      amitriptyline (ELAVIL) 10 MG tablet Take 10 mg by mouth nightly as needed for Pain.           STOP taking these medications       tiZANidine (ZANAFLEX) 4 MG tablet Comments:   Reason for Stopping:                  CONSULTS         FOLLOW UP      Follow-up Information       David Kim Jr., MD Follow up.    Specialty: Family Medicine  Why: The hospital or the office of David Kim MD will call the patient with a 1 week follow up appointment on Monday.  Contact information:  [address] [place] [address] A  [facility]  [id number]  [phone]                                 DISCHARGE INSTRUCTIONS     Explained in detail to the patient about the discharge plan, medications, and follow-up visits. Pt understands and agrees with the treatment plan.  Discharged Condition: stable  Diet as tolerated  Activities as tolerated  Discharge to: Home or Self Care    TIME SPENT ON DISCHARGE   35 minutes        Bisi Zaragoza MD  Internal Medicine  Department of Hospital Medicine [facility]      This document was created using electronic dictation services.  Please excuse any errors that may have been made.  Contact me if any questions regarding documentation to clarify verbiage.

## 2024-12-17 LAB
BACTERIA UR CULT: ABNORMAL
BACTERIA UR CULT: ABNORMAL

## 2025-01-03 ENCOUNTER — TELEPHONE (OUTPATIENT)
Facility: CLINIC | Age: 85
End: 2025-01-03
Payer: OTHER GOVERNMENT

## 2025-01-03 NOTE — TELEPHONE ENCOUNTER
Pt contacted office inquiring about medical marijuana, scanned and emailed apothecary providers emailed to [email or web address]

## 2025-01-30 ENCOUNTER — TELEPHONE (OUTPATIENT)
Facility: CLINIC | Age: 85
End: 2025-01-30
Payer: OTHER GOVERNMENT

## 2025-01-30 DIAGNOSIS — G89.29 CHRONIC BACK PAIN GREATER THAN 3 MONTHS DURATION: Primary | ICD-10-CM

## 2025-01-30 DIAGNOSIS — M51.362 DEGENERATION OF INTERVERTEBRAL DISC OF LUMBAR REGION WITH DISCOGENIC BACK PAIN AND LOWER EXTREMITY PAIN: ICD-10-CM

## 2025-01-30 DIAGNOSIS — M47.816 LUMBAR SPONDYLOSIS: ICD-10-CM

## 2025-01-30 DIAGNOSIS — M54.9 CHRONIC BACK PAIN GREATER THAN 3 MONTHS DURATION: Primary | ICD-10-CM

## 2025-01-30 NOTE — TELEPHONE ENCOUNTER
----- Message from Med Assistant Whyte sent at 1/30/2025  9:04 AM CST -----  Regarding: RE: PT with DRY Needling  Dr Monet please see note from Ms Bai  ----- Message -----  From: Bhumika Holcomb  Sent: 1/28/2025   3:00 PM CST  To: Tierney URENA Staff  Subject: PT with DRY Needling                             Mr. Morin phoned the office today because Dr. Luna denied the referral because he was not a good candidate for surgery. He wants to try dry needling at [facility] in [place], or is there something else you think will help.

## 2025-02-05 ENCOUNTER — TELEPHONE (OUTPATIENT)
Facility: CLINIC | Age: 85
End: 2025-02-05
Payer: OTHER GOVERNMENT

## 2025-02-05 NOTE — TELEPHONE ENCOUNTER
----- Message from María Monet MD sent at 2/4/2025  4:00 PM CST -----  Regarding: RE: PT with DRY Needling  Referral was entered on 1/30.  ----- Message -----  From: Reno Sanchez MA  Sent: 2/4/2025   3:00 PM CST  To: María Monet MD  Subject: FW: PT with DRY Needling                           ----- Message -----  From: Isabell Zaragoza MA  Sent: 1/30/2025   9:04 AM CST  To: María Monet MD; [facility] Staff  Subject: RE: PT with DRY Needling                         Dr Monet please see note from Ms Bai  ----- Message -----  From: Bhumika Holcomb  Sent: 1/28/2025   3:00 PM CST  To: Tierney URENA Staff  Subject: PT with DRY Needling                             Mr. Morin phoned the office today because Dr. Luna denied the referral because he was not a good candidate for surgery. He wants to try dry needling at [facility] in [place], or is there something else you think will help.

## 2025-02-18 DIAGNOSIS — C64.2 MALIGNANT NEOPLASM OF LEFT KIDNEY, EXCEPT RENAL PELVIS: Primary | ICD-10-CM

## 2025-02-25 ENCOUNTER — HOSPITAL ENCOUNTER (OUTPATIENT)
Dept: RADIOLOGY | Facility: HOSPITAL | Age: 85
Discharge: HOME | End: 2025-02-25
Attending: UROLOGY
Payer: OTHER GOVERNMENT

## 2025-02-25 DIAGNOSIS — C64.2 MALIGNANT NEOPLASM OF LEFT KIDNEY, EXCEPT RENAL PELVIS: ICD-10-CM

## 2025-02-25 PROCEDURE — A9552 F18 FDG: HCPCS | Performed by: UROLOGY

## 2025-02-25 PROCEDURE — 78815 PET IMAGE W/CT SKULL-THIGH: CPT | Mod: TC

## 2025-02-25 RX ORDER — FLUDEOXYGLUCOSE F18 500 MCI/ML
10 INJECTION INTRAVENOUS
Status: COMPLETED | OUTPATIENT
Start: 2025-02-25 | End: 2025-02-25

## 2025-02-25 RX ADMIN — FLUDEOXYGLUCOSE F-18 11 MILLICURIE: 500 INJECTION INTRAVENOUS at 11:02

## 2025-03-17 ENCOUNTER — HOSPITAL ENCOUNTER (EMERGENCY)
Facility: HOSPITAL | Age: 85
Discharge: HOME | End: 2025-03-17
Attending: EMERGENCY MEDICINE
Payer: OTHER GOVERNMENT

## 2025-03-17 VITALS
SYSTOLIC BLOOD PRESSURE: 142 MMHG | HEART RATE: 81 BPM | HEIGHT: 71 IN | TEMPERATURE: 98 F | OXYGEN SATURATION: 95 % | DIASTOLIC BLOOD PRESSURE: 68 MMHG | WEIGHT: 180 LBS | RESPIRATION RATE: 18 BRPM | BODY MASS INDEX: 25.2 KG/M2

## 2025-03-17 DIAGNOSIS — C79.9 METASTATIC MALIGNANT NEOPLASM, UNSPECIFIED SITE: ICD-10-CM

## 2025-03-17 DIAGNOSIS — R07.81 RIB PAIN: Primary | ICD-10-CM

## 2025-03-17 LAB
ALBUMIN SERPL-MCNC: 3.1 G/DL (ref 3.4–4.8)
ALBUMIN/GLOB SERPL: 0.6 RATIO (ref 1.1–2)
ALP SERPL-CCNC: 179 UNIT/L (ref 40–150)
ALT SERPL-CCNC: 19 UNIT/L (ref 0–55)
ANION GAP SERPL CALC-SCNC: 9 MEQ/L
AST SERPL-CCNC: 22 UNIT/L (ref 5–34)
BASOPHILS # BLD AUTO: 0.02 X10(3)/MCL
BASOPHILS NFR BLD AUTO: 0.2 %
BILIRUB SERPL-MCNC: 0.8 MG/DL
BUN SERPL-MCNC: 20.1 MG/DL (ref 8.4–25.7)
CALCIUM SERPL-MCNC: 9.3 MG/DL (ref 8.8–10)
CHLORIDE SERPL-SCNC: 105 MMOL/L (ref 98–107)
CO2 SERPL-SCNC: 23 MMOL/L (ref 23–31)
CREAT SERPL-MCNC: 1.29 MG/DL (ref 0.72–1.25)
CREAT/UREA NIT SERPL: 16
EOSINOPHIL # BLD AUTO: 0.05 X10(3)/MCL (ref 0–0.9)
EOSINOPHIL NFR BLD AUTO: 0.5 %
ERYTHROCYTE [DISTWIDTH] IN BLOOD BY AUTOMATED COUNT: 11.6 % (ref 11.5–17)
GFR SERPLBLD CREATININE-BSD FMLA CKD-EPI: 55 ML/MIN/1.73/M2
GLOBULIN SER-MCNC: 4.9 GM/DL (ref 2.4–3.5)
GLUCOSE SERPL-MCNC: 101 MG/DL (ref 82–115)
HCT VFR BLD AUTO: 35.4 % (ref 42–52)
HGB BLD-MCNC: 11.9 G/DL (ref 14–18)
IMM GRANULOCYTES # BLD AUTO: 0.03 X10(3)/MCL (ref 0–0.04)
IMM GRANULOCYTES NFR BLD AUTO: 0.3 %
LYMPHOCYTES # BLD AUTO: 0.91 X10(3)/MCL (ref 0.6–4.6)
LYMPHOCYTES NFR BLD AUTO: 9.4 %
MCH RBC QN AUTO: 31.6 PG (ref 27–31)
MCHC RBC AUTO-ENTMCNC: 33.6 G/DL (ref 33–36)
MCV RBC AUTO: 93.9 FL (ref 80–94)
MONOCYTES # BLD AUTO: 0.73 X10(3)/MCL (ref 0.1–1.3)
MONOCYTES NFR BLD AUTO: 7.5 %
NEUTROPHILS # BLD AUTO: 7.93 X10(3)/MCL (ref 2.1–9.2)
NEUTROPHILS NFR BLD AUTO: 82.1 %
PLATELET # BLD AUTO: 255 X10(3)/MCL (ref 130–400)
PMV BLD AUTO: 10 FL (ref 7.4–10.4)
POTASSIUM SERPL-SCNC: 5.8 MMOL/L (ref 3.5–5.1)
PROT SERPL-MCNC: 8 GM/DL (ref 5.8–7.6)
RBC # BLD AUTO: 3.77 X10(6)/MCL (ref 4.7–6.1)
SODIUM SERPL-SCNC: 137 MMOL/L (ref 136–145)
WBC # BLD AUTO: 9.67 X10(3)/MCL (ref 4.5–11.5)

## 2025-03-17 PROCEDURE — 99284 EMERGENCY DEPT VISIT MOD MDM: CPT | Mod: 25

## 2025-03-17 PROCEDURE — 96372 THER/PROPH/DIAG INJ SC/IM: CPT | Performed by: EMERGENCY MEDICINE

## 2025-03-17 PROCEDURE — 85025 COMPLETE CBC W/AUTO DIFF WBC: CPT | Performed by: EMERGENCY MEDICINE

## 2025-03-17 PROCEDURE — 63600175 PHARM REV CODE 636 W HCPCS: Performed by: EMERGENCY MEDICINE

## 2025-03-17 PROCEDURE — 80053 COMPREHEN METABOLIC PANEL: CPT | Performed by: EMERGENCY MEDICINE

## 2025-03-17 RX ORDER — HYDROCODONE BITARTRATE AND ACETAMINOPHEN 7.5; 325 MG/1; MG/1
1 TABLET ORAL EVERY 6 HOURS PRN
Qty: 12 TABLET | Refills: 0 | Status: SHIPPED | OUTPATIENT
Start: 2025-03-17

## 2025-03-17 RX ORDER — MORPHINE SULFATE 4 MG/ML
4 INJECTION, SOLUTION INTRAMUSCULAR; INTRAVENOUS
Status: COMPLETED | OUTPATIENT
Start: 2025-03-17 | End: 2025-03-17

## 2025-03-17 RX ADMIN — MORPHINE SULFATE 4 MG: 4 INJECTION, SOLUTION INTRAMUSCULAR; INTRAVENOUS at 02:03

## 2025-03-17 NOTE — ED PROVIDER NOTES
Encounter Date: 3/17/2025       History     Chief Complaint   Patient presents with    Abdominal Pain     Pt with complaints of right sided abdominal pain for 3 days.  He has a urostomy.  He also has complaints of right hip pain and leg pain for 3 weeks.  He also has a rectal fissure that has been giving him problems. They prescribed cream that has not been helping.  He has no nausea or vomiting. He does have diarrhea.  He says he had a PET scan recently that showed cancer in the lungs and liver. Taking tramadol without relief.        Leg Pain    Hip Pain     This 84-year-old man presents with complaints pain in the right side of his lower rib cage and right upper quadrant of his abdomen.  It has been going on for some time but has gotten worse over the last 3 days.  He also complains of pain in his right hip in his right leg for the past 3 weeks. He was recently diagnosed with Probable metastatic urothelial carcinoma with widespread metastatic disease suggested on recent PET/CT scanning.      Review of patient's allergies indicates:   Allergen Reactions    Atorvastatin      Other Reaction(s): Chest pain  [[place]:[id number]]     Past Medical History:   Diagnosis Date    Bladder cancer     Cancer of trigone of urinary bladder     Chronic back pain     Coronary artery disease     GERD (gastroesophageal reflux disease)     Hypertension     Osteoarthritis     Renal cancer     Testicular pain     Walker as ambulation aid      Past Surgical History:   Procedure Laterality Date    ATHERECTOMY  12/01/2023    Procedure: Atherectomy;  Surgeon: Tj Lovell MD;  Location: [facility] CATH LAB;  Service: Cardiology;;    CHOLECYSTECTOMY      COLONOSCOPY      CORONARY ANGIOPLASTY WITH STENT PLACEMENT      ESOPHAGOGASTRODUODENOSCOPY      HERNIA REPAIR Bilateral     IVUS, CORONARY  12/01/2023    Procedure: IVUS, Coronary;  Surgeon: Tj Lovell MD;  Location: [facility] CATH LAB;  Service: Cardiology;;    LEFT HEART CATHETERIZATION N/A  2023    Procedure: Left heart cath;  Surgeon: Tj Lovell MD;  Location: [facility] CATH LAB;  Service: Cardiology;  Laterality: N/A;    LEFT HEART CATHETERIZATION Left 2023    Procedure: Left heart cath;  Surgeon: Dalia Li MD;  Location: [facility] CATH LAB;  Service: Cardiology;  Laterality: Left;  LHC    NEPHRECTOMY Left     PENILE PROSTHESIS IMPLANT      PERCUTANEOUS CORONARY INTERVENTION, ARTERY N/A 2023    Procedure: Percutaneous coronary intervention;  Surgeon: Tj Lovell MD;  Location: [facility] CATH LAB;  Service: Cardiology;  Laterality: N/A;    PROSTATE SURGERY  10/2022    Removal with bladder removal    Removal of bladder tumors      ROBOT-ASSISTED LAPAROSCOPIC CYSTECTOMY N/A 10/11/2022    Procedure: ROBOTIC CYSTECTOMY;  Surgeon: Flip Barrett MD;  Location: [facility];  Service: Urology;  Laterality: N/A;  ROBOT ASSISTED CYSTECTOMY WITH URINARY DIVERSION //   ANY INDICATED PROCEDURES    ROBOT-ASSISTED LAPAROSCOPIC PROSTATECTOMY USING DA RASHEED XI  10/11/2022    Procedure: XI ROBOTIC PROSTATECTOMY;  Surgeon: Flip Barrett MD;  Location: [facility];  Service: Urology;;    SMALL INTESTINE SURGERY  10/2022    Ileistomy    TRANSFORAMINAL EPIDURAL INJECTION OF STEROID Bilateral 2024    Procedure: INJECTION, STEROID, EPIDURAL, TRANSFORAMINAL APPROACH   ////Bilateral TFESI L4 ([place]);  Surgeon: María Monet MD;  Location: [address] OR;  Service: Pain Management;  Laterality: Bilateral;  Bilateral TFESI L4 ([place])    TURBT, WITH BLUE LIGHT CYSTOSCOPY AND CYSVIEW      URETHRECTOMY N/A 2024    Procedure: URETHRECTOMY;  Surgeon: Cipriano Callejas MD;  Location: [facility];  Service: Urology;  Laterality: N/A;  WITH DR. CHESTER BARRETT    urostomy       Family History   Problem Relation Name Age of Onset    Depression Mother Marley Hernandez Schexnider             Diabetes Mother Marley Hernandez Schexnider             Cancer Brother Mitchell Stafford Best               Social History[1]  Review of Systems   Constitutional:  Negative for fever.   HENT:  Negative for sore throat.    Respiratory:  Negative for shortness of breath.    Cardiovascular:  Positive for chest pain (right lower chest wall).   Gastrointestinal:  Negative for nausea.   Genitourinary:  Negative for dysuria.   Musculoskeletal:  Positive for arthralgias (right hip and leg). Negative for back pain.   Skin:  Negative for rash.   Neurological:  Negative for weakness.   Hematological:  Does not bruise/bleed easily.       Physical Exam     Initial Vitals [03/17/25 1349]   BP Pulse Resp Temp SpO2   (!) 161/82 81 18 98.1 °F (36.7 °C) 95 %      MAP       --         Physical Exam    Nursing note and vitals reviewed.  Constitutional: He appears well-developed and well-nourished.   HENT:   Head: Normocephalic and atraumatic. Mouth/Throat: Mucous membranes are normal.   Eyes: EOM are normal. Pupils are equal, round, and reactive to light.   Neck: Neck supple.   Normal range of motion.  Cardiovascular:  Normal rate, regular rhythm, normal heart sounds and intact distal pulses.           Pulmonary/Chest: Breath sounds normal.   Abdominal: Abdomen is soft. Bowel sounds are normal.   Musculoskeletal:         General: Normal range of motion.      Cervical back: Normal range of motion and neck supple.     Neurological: He is alert and oriented to person, place, and time. He has normal strength.   Skin: Skin is warm and dry. Capillary refill takes less than 2 seconds.   Psychiatric: He has a normal mood and affect. His behavior is normal. Judgment and thought content normal.         ED Course   Procedures  Labs Reviewed   COMPREHENSIVE METABOLIC PANEL - Abnormal       Result Value    Sodium 137      Potassium 5.8 (*)     Chloride 105      CO2 23      Glucose 101      Blood Urea Nitrogen 20.1      Creatinine 1.29 (*)     Calcium 9.3      Protein Total 8.0 (*)     Albumin 3.1 (*)     Globulin 4.9 (*)     Albumin/Globulin Ratio  0.6 (*)     Bilirubin Total 0.8       (*)     ALT 19      AST 22      eGFR 55      Anion Gap 9.0      BUN/Creatinine Ratio 16     CBC WITH DIFFERENTIAL - Abnormal    WBC 9.67      RBC 3.77 (*)     Hgb 11.9 (*)     Hct 35.4 (*)     MCV 93.9      MCH 31.6 (*)     MCHC 33.6      RDW 11.6      Platelet 255      MPV 10.0      Neut % 82.1      Lymph % 9.4      Mono % 7.5      Eos % 0.5      Basophil % 0.2      Imm Grans % 0.3      Neut # 7.93      Lymph # 0.91      Mono # 0.73      Eos # 0.05      Baso # 0.02      Imm Gran # 0.03     CBC W/ AUTO DIFFERENTIAL    Narrative:     The following orders were created for panel order CBC auto differential.  Procedure                               Abnormality         Status                     ---------                               -----------         ------                     CBC with Differential[[id number]]       Abnormal            Final result                 Please view results for these tests on the individual orders.          Imaging Results    None          Medications   morphine injection 4 mg (4 mg Intramuscular Given 3/17/25 2820)     Medical Decision Making  Amount and/or Complexity of Data Reviewed  Labs: ordered.    Risk  Prescription drug management.                                      Clinical Impression:  Final diagnoses:  [R07.81] Rib pain (Primary)  [C79.9] Metastatic malignant neoplasm, unspecified site          ED Disposition Condition    Discharge Stable          ED Prescriptions       Medication Sig Dispense Start Date End Date Auth. Provider    HYDROcodone-acetaminophen (NORCO) 7.5-325 mg per tablet Take 1 tablet by mouth every 6 (six) hours as needed for Pain. 12 tablet 3/17/2025 -- Sujit Rosenthal MD          Follow-up Information       Follow up With Specialties Details Why Contact Info    David Kim Jr., MD Family Medicine Schedule an appointment as soon as possible for a visit in 3 days As needed for further pain meds [address] [place] [address]  [facility] [employer] [facility] [employer] [address]  [phone]                   [1]   Social History  Tobacco Use    Smoking status: Former     Current packs/day: 1.50     Average packs/day: 1.5 packs/day for 15.0 years (22.5 ttl pk-yrs)     Types: Cigarettes    Smokeless tobacco: Never    Tobacco comments:     Dont remember dates. - appx dates [id number]   Substance Use Topics    Alcohol use: Never    Drug use: Never        Sujit Rosenthal MD  03/17/25 1056

## (undated) DEVICE — BOWL STERILE LARGE 32OZ

## (undated) DEVICE — SPONGE LAP STRL 4X18IN

## (undated) DEVICE — SUT ETHILON 2-0 FS 18IN BLK

## (undated) DEVICE — COVER PROBE US 5.5X58L NON LTX

## (undated) DEVICE — CATH SHOCKWAVE C2+ IVL 4.0X12M

## (undated) DEVICE — CATH GUIDE LINER  V3 6F

## (undated) DEVICE — JELLY SURGILUBE LUBE TUBE 2OZ

## (undated) DEVICE — SUT 2/0 30IN SILK BLK BRAI

## (undated) DEVICE — CHLORAPREP 10.5 ML APPLICATOR

## (undated) DEVICE — GLOVE PROTEXIS BLUE LATEX 8

## (undated) DEVICE — PAD DEFIB CADENCE ADULT R2

## (undated) DEVICE — KIT SURGICAL TURNOVER

## (undated) DEVICE — CONTRAST ISOVUE M 200 20ML VIL

## (undated) DEVICE — GAUZE SPONGE BULKEE 6X6.75IN

## (undated) DEVICE — ELECTRODE PATIENT RETURN DISP

## (undated) DEVICE — ADHESIVE DERMABOND MINI HV

## (undated) DEVICE — TRAY CATH FOL SIL URIMTR 16FR

## (undated) DEVICE — STAPLER SUREFORM 60 SPU

## (undated) DEVICE — KIT MINI STK MAX COAX 5FR 10CM

## (undated) DEVICE — COVER BACK TBL HD 2-TIER 72IN

## (undated) DEVICE — SUT MFIL VIOL PDS II TP-1 30IN

## (undated) DEVICE — BAG INZII TISS RETRV 12/15MM

## (undated) DEVICE — IRRIGATOR SUCTION W/TIP

## (undated) DEVICE — SUT 4-0 CHROMIC GUT / RB1

## (undated) DEVICE — MARKER WRITESITE SKIN CHLRAPRP

## (undated) DEVICE — SOL IRRI STRL WATER 1000ML

## (undated) DEVICE — DEVICE ENSEAL X1 LARGE JAW

## (undated) DEVICE — TRAY SKIN SCRUB WET PREMIUM

## (undated) DEVICE — KIT SURGIFLO HEMOSTATIC MATRIX

## (undated) DEVICE — OBTURATOR BLADELESS 8MM XI CLR

## (undated) DEVICE — STAPLER ECHELON FLEX 60MM 44CM

## (undated) DEVICE — RELOAD SUREFORM 60 2.5 WHT 6R

## (undated) DEVICE — GLOVE SIGNATURE MICRO LTX 6.5

## (undated) DEVICE — DRAPE STERI LONG

## (undated) DEVICE — COVER MAYO STAND REINFRCD 30

## (undated) DEVICE — DRAPE FLUID WARMER ORS 44X44IN

## (undated) DEVICE — RELOAD ECHELON FLEX WHT 60MM

## (undated) DEVICE — PORT AIRSEAL 12/120MM LPI

## (undated) DEVICE — STOPCOCK 3-WAY

## (undated) DEVICE — ADAPTER DUAL NSL LUER M-M 7FT

## (undated) DEVICE — SUT MCRYL PLUS 4-0 PS2 27IN

## (undated) DEVICE — SUT SILK 2-0 SH 18IN BLACK

## (undated) DEVICE — RESERVOIR JACKSON-PRATT 100CC

## (undated) DEVICE — SUT CHROMIC 3-0 SH 27IN GUT

## (undated) DEVICE — SOL CLEARIFY VISUALIZATION LAP

## (undated) DEVICE — SUT 4/0 27IN PDS II VIO MO

## (undated) DEVICE — CONTAINER SPECIMEN SCREW 4OZ

## (undated) DEVICE — GLOVE PROTEXIS HYDROGEL SZ7.5

## (undated) DEVICE — Device

## (undated) DEVICE — CONTRAST ISOVUE 370 500ML MULT

## (undated) DEVICE — ADHESIVE DERMABOND ADVANCED

## (undated) DEVICE — HOOK LONE STAR RETRCT 12MM

## (undated) DEVICE — NDL SYR 10ML 18X1.5 LL BLUNT

## (undated) DEVICE — COVER TIP CURVED SCISSORS XI

## (undated) DEVICE — BAND TR WITH INFLATOR

## (undated) DEVICE — LEGGING SURG CONV 43X28IN

## (undated) DEVICE — SET ANGIO ACIST CVI ANGIOTOUCH

## (undated) DEVICE — SPONGE LAP STRL 18X18IN

## (undated) DEVICE — SUT VICRYL+ 27 UR-6 VIOL

## (undated) DEVICE — SEALANT VISTASEAL FIBRIN 10ML

## (undated) DEVICE — DRESSING TELFA N ADH 3X8IN

## (undated) DEVICE — PACK OR CLEAN UP COMBO SIZE 2

## (undated) DEVICE — KIT MANIFOLD LOW PRESS TUBING

## (undated) DEVICE — SPONGE COTTON TRAY 4X4IN

## (undated) DEVICE — GUIDE LAUNCHER 6FR EBU 3.75

## (undated) DEVICE — SUT SILK 3-0 SH 18IN BLACK

## (undated) DEVICE — DRAIN JACKSON PRATT TRCR 19FR

## (undated) DEVICE — CUSHION  WC FOAM 20X20X.75IN

## (undated) DEVICE — CATH NC EMERGE MR 4X12MM

## (undated) DEVICE — CANNULA SEAL 12MM

## (undated) DEVICE — CANNULA ADULT NASAL 7FT

## (undated) DEVICE — SET TRI-LUMEN FILTERED TUBE

## (undated) DEVICE — DRAPE UTILITY W/ TAPE 20X30IN

## (undated) DEVICE — SOL NACL IRR 1000ML BTL

## (undated) DEVICE — CLIP HEMO-LOK MLX LARGE LF

## (undated) DEVICE — GOWN X-LG STERILE BACK

## (undated) DEVICE — DRAPE ANGIO BRACH 38X44IN

## (undated) DEVICE — RELOAD SUREFORM 60 3.5 BLU 6R

## (undated) DEVICE — CATH IMPULSE MP 5FR 145CM

## (undated) DEVICE — DEVICE INDEFLATOR BASIX

## (undated) DEVICE — SUT COATED VICRYL 3-0 1X27

## (undated) DEVICE — KIT GEN LAPAROSCOPY LAFAYETTE

## (undated) DEVICE — SEALER LIGASURE IMPACT 18CM

## (undated) DEVICE — DRAPE MEDIUM SHEET 40X70IN

## (undated) DEVICE — KIT GLIDESHEATH SLEND 6FR 10CM

## (undated) DEVICE — KIT URINE METER 350ML STAT LOC

## (undated) DEVICE — SUT VICRYL CTD 2-0 GI 27 SH

## (undated) DEVICE — STAPLER INT PROX TX 60X3.5MM

## (undated) DEVICE — APPLIER LIGACLIP LG 13IN

## (undated) DEVICE — CANNULA REDUCER 12-8MM

## (undated) DEVICE — SHEATH INTRODUCER BRITE 5X23

## (undated) DEVICE — GUIDEWIRE RUNTHROUGH EF 180CM

## (undated) DEVICE — GUIDEWIRE INQWIRE SE 3MM JTIP

## (undated) DEVICE — SYR 10CC LUER LOCK

## (undated) DEVICE — SOL NORMAL USPCA 0.9%

## (undated) DEVICE — GLOVE 7.5 PROTEXIS PI MICRO

## (undated) DEVICE — NDL HYPO REG 25G X 1 1/2

## (undated) DEVICE — NDL QUINCKE S/SU 22GA 5IN

## (undated) DEVICE — HOOK LONE STAR SHRP ELAS 5MM

## (undated) DEVICE — DRAPE ARM DAVINCI XI

## (undated) DEVICE — SEAL UNIVERSAL 5MM-8MM XI

## (undated) DEVICE — POWDER ARISTA AH 3G

## (undated) DEVICE — TOWEL OR BLUE STRL 16X26 8/PK

## (undated) DEVICE — HEMOSTAT SURGICEL 2X14IN

## (undated) DEVICE — SET SMARTSITE EXT SMALLBORE NF

## (undated) DEVICE — CATH EAGLE EYE PLATINUM

## (undated) DEVICE — DRAPE COLUMN DAVINCI XI

## (undated) DEVICE — CATH ALL PUR URTHL RR 12FR

## (undated) DEVICE — SUT VICRYL 3-0 27 SH

## (undated) DEVICE — NDL 18GA X1 1/2 REG BEVEL

## (undated) DEVICE — SCISSOR CURVED ENDOPATH 5MM

## (undated) DEVICE — CUTTER PROXIMATE BLUE 75MM

## (undated) DEVICE — RELOAD PROXIMATE CUT BLUE 75MM

## (undated) DEVICE — KIT RETR PERI/GYN W/O STAYS

## (undated) DEVICE — KIT HAND CONTROL HIGH PRESSUR

## (undated) DEVICE — CATH IMPULSE PIGTAIL 5FR 125CM

## (undated) DEVICE — RELOAD ECHELON FLEX GRN 60MM

## (undated) DEVICE — CANNULA DUAL CO2/O2 NASAL 7FT

## (undated) DEVICE — DRAIN PENROSE SIL .25X12IN

## (undated) DEVICE — SHEATH INTRODUCER BRITE 6X23

## (undated) DEVICE — NDL FLTR 5MCRN BLNT TIP 18GX1

## (undated) DEVICE — NDL INSUF ULTRA VERESS 120MM

## (undated) DEVICE — APPLICATOR VISTASEAL FLEX 45CM

## (undated) DEVICE — PAD PINK TRENDELENBURG POS XL

## (undated) DEVICE — APPLICATOR ARISTA FLEX XL

## (undated) DEVICE — BARRIER COLOSTOMY 2 3/4IN

## (undated) DEVICE — SYS WASTE FLD DISPOSAL 1400ML

## (undated) DEVICE — SYR 3ML LL 18GA 1.5IN